# Patient Record
Sex: MALE | Race: WHITE
[De-identification: names, ages, dates, MRNs, and addresses within clinical notes are randomized per-mention and may not be internally consistent; named-entity substitution may affect disease eponyms.]

---

## 2017-04-13 ENCOUNTER — HOSPITAL ENCOUNTER (OUTPATIENT)
Dept: HOSPITAL 47 - LABWHC1 | Age: 72
Discharge: HOME | End: 2017-04-13
Payer: MEDICARE

## 2017-04-13 DIAGNOSIS — B02.31: Primary | ICD-10-CM

## 2017-04-13 LAB
HSV IGG SER-IMP: NEGATIVE
HSV IGG SER-IMP: POSITIVE

## 2017-04-13 PROCEDURE — 86696 HERPES SIMPLEX TYPE 2 TEST: CPT

## 2017-04-13 PROCEDURE — 86695 HERPES SIMPLEX TYPE 1 TEST: CPT

## 2017-04-13 PROCEDURE — 86787 VARICELLA-ZOSTER ANTIBODY: CPT

## 2017-04-13 PROCEDURE — 36415 COLL VENOUS BLD VENIPUNCTURE: CPT

## 2017-04-13 PROCEDURE — 86644 CMV ANTIBODY: CPT

## 2017-04-13 PROCEDURE — 86645 CMV ANTIBODY IGM: CPT

## 2019-06-10 ENCOUNTER — HOSPITAL ENCOUNTER (OUTPATIENT)
Dept: HOSPITAL 47 - RADCTMAIN | Age: 74
Discharge: HOME | End: 2019-06-10
Payer: MEDICARE

## 2019-06-10 DIAGNOSIS — R10.13: Primary | ICD-10-CM

## 2019-06-10 LAB — BUN SERPL-SCNC: 30 MG/DL (ref 9–20)

## 2019-06-10 PROCEDURE — 84520 ASSAY OF UREA NITROGEN: CPT

## 2019-06-10 PROCEDURE — 36415 COLL VENOUS BLD VENIPUNCTURE: CPT

## 2019-06-10 PROCEDURE — 74177 CT ABD & PELVIS W/CONTRAST: CPT

## 2019-06-10 PROCEDURE — 82565 ASSAY OF CREATININE: CPT

## 2019-06-10 NOTE — CT
EXAMINATION TYPE: CT abdomen pelvis w con

 

DATE OF EXAM: 6/10/2019

 

COMPARISON: None.

 

HISTORY: Epigastric pain.

 

CT DLP: 1599 mGycm, Automated Exposure Control for Dose Reduction was Utilized.

 

CONTRAST: 

CT scan of the abdomen and pelvis is performed with oral and with IV Contrast, patient injected with 
80ml mL of Isovue 300.

 

FINDINGS:

 

LUNG BASES: There is patchy bibasilar linear scarring and/or atelectasis identified. There is small t
o tiny pericardial effusion and cardiomegaly noted.

 

LIVER/GB: Cholecystectomy clips are seen.

 

PANCREAS:  No significant abnormality is seen.

 

SPLEEN:  No significant abnormality is seen.

 

ADRENALS: There is nonspecific 2.0 cm left adrenal mass axial image 31. This is noted stable in size 
from CT chest November 15, 2012 this presumed benign in etiology.

 

KIDNEYS: There is a 8.4 cm simple appearing partially exophytic renal cyst lower pole of the left kid
olivia. Symmetric cortical medullary uptake and excretion from both kidneys are seen without hydronephro
sis identified bilaterally.

 

BOWEL: The oral contrast does not reach colonic level making evaluation of distal bowel suboptimal. T
here are surgical sutures at the diaphragmatic hiatus redemonstrated. There is no suspicious small or
 large bowel dilatation seen. Normal-appearing appendix is seen from cecum. Some diverticula are seen
 in the left and sigmoid colon with redundant sigmoid colon into the abdomen. No CT evidence for acut
e diverticulitis.

 

PROSTATE/SEMINAL VESICLES: Enlarged prostate gland consistent with BPH is present.

 

LYMPH NODES:  No greater than 1cm abdominal or pelvic lymph nodes are appreciated.

 

OSSEOUS STRUCTURES: Moderate narrowing of both hip joints. Levoconvex scoliosis centered at L3 level.
 Slight grade 1 retrolisthesis of L3 on L4 with moderate disc space narrowing. Multilevel facet arthr
opathy in the lower lumbar spine is present.

 

OTHER: Mild calcified plaque of the aorta extends into branch vessels.

 

IMPRESSION:  No significant finding is seen to account for patient's clinical symptoms of epigastric 
pain.

## 2019-08-22 ENCOUNTER — HOSPITAL ENCOUNTER (OUTPATIENT)
Dept: HOSPITAL 47 - ORWHC2ENDO | Age: 74
Discharge: HOME | End: 2019-08-22
Payer: MEDICARE

## 2019-08-22 VITALS — HEART RATE: 56 BPM

## 2019-08-22 VITALS — BODY MASS INDEX: 29.5 KG/M2

## 2019-08-22 VITALS — TEMPERATURE: 98.2 F

## 2019-08-22 VITALS — SYSTOLIC BLOOD PRESSURE: 182 MMHG | RESPIRATION RATE: 16 BRPM | DIASTOLIC BLOOD PRESSURE: 71 MMHG

## 2019-08-22 DIAGNOSIS — K57.30: ICD-10-CM

## 2019-08-22 DIAGNOSIS — K21.9: ICD-10-CM

## 2019-08-22 DIAGNOSIS — Z12.11: ICD-10-CM

## 2019-08-22 DIAGNOSIS — Z88.1: ICD-10-CM

## 2019-08-22 DIAGNOSIS — Z95.5: ICD-10-CM

## 2019-08-22 DIAGNOSIS — K29.50: Primary | ICD-10-CM

## 2019-08-22 DIAGNOSIS — K64.8: ICD-10-CM

## 2019-08-22 DIAGNOSIS — I25.10: ICD-10-CM

## 2019-08-22 DIAGNOSIS — Z85.6: ICD-10-CM

## 2019-08-22 DIAGNOSIS — Z88.8: ICD-10-CM

## 2019-08-22 DIAGNOSIS — D12.3: ICD-10-CM

## 2019-08-22 DIAGNOSIS — Z86.010: ICD-10-CM

## 2019-08-22 DIAGNOSIS — D12.4: ICD-10-CM

## 2019-08-22 DIAGNOSIS — Z91.048: ICD-10-CM

## 2019-08-22 PROCEDURE — 45385 COLONOSCOPY W/LESION REMOVAL: CPT

## 2019-08-22 PROCEDURE — 43239 EGD BIOPSY SINGLE/MULTIPLE: CPT

## 2019-08-22 PROCEDURE — 88305 TISSUE EXAM BY PATHOLOGIST: CPT

## 2019-08-22 RX ADMIN — POTASSIUM CHLORIDE SCH MLS: 14.9 INJECTION, SOLUTION INTRAVENOUS at 11:54

## 2019-08-22 RX ADMIN — POTASSIUM CHLORIDE SCH MLS: 14.9 INJECTION, SOLUTION INTRAVENOUS at 11:31

## 2019-08-22 NOTE — P.PCN
Date of Procedure: 08/22/19


Description of Procedure: 


Brief history:


Patient is a pleasant scheduled for an elective upper endoscopy as well as 

colonoscopy as a part of evaluation of abdominal pain, GERD and history of colon

polyps.  Patient reports diffuse abdominal pain.  He also has a history of 

vagotomy, pyloroplasty and hiatal hernia repair.  Reports intermittent dysphagia

at the level of the hiatal hernia repair.  Previously underwent colonoscopies 

with Dr. Serrato with last in 2016 with polyps removed.





Procedure performed:


Esophagogastroduodenoscopy


Colonoscopy





Estimated blood loss:


Minimal.





Preoperative diagnosis:


Abdominal pain, GERD, history of colon polyps, last colonoscopy 3 years ago 

significant for 3 polyps resected





Anesthesia: 


[facility] [place]





Procedure:


After informed consent was obtained from the patient  was brought into the 

endoscopy unit and IV  sedation was administered by anesthesia under continuous 

monitoring.  Initially upper endoscopy was done.  The Olympus  video 

endoscope was inserted inserted into the mouth and esophagus intubated without 

any difficulty and was gradually advanced into the stomach and duodenum and 

carefully examined.  The small bowel appeared normal with biopsies taken.  The 

scope was then withdrawn into the stomach which was adequately insufflated with 

air and carefully examined.  Anatomy was consistent with prior history of 

pyloroplasty.  There was some mild diffuse erythema which was randomly biopsied.

 The scope was then withdrawn into the esophagus.  The GE junction was located 

at 44 cm from the incisors and biopsies were taken.  The esophagus appeared 

normal ulcerations.  Patient tolerated the procedure well..  The scope was then 

withdrawn into the stomach adequately insufflated with air and upon careful 

examination  the antrum and body, cardia and fundus appeared normal.  The scope 

was then withdrawn into the esophagus.  The GE junction was located at 40 cm to 

the incisors.  It appeared regular with no erythema erosions or ulcerations.  

Rest of the esophagus appeared normal.  Patient tolerated the procedure well.





At this time the patient continued to remain sedation.  Initial digital rectal 

examination was normal.  Olympus  video colonoscope was then inserted into

the rectum and gradually advanced to the cecum without any difficulty.  Careful 

examination was performed as the scope was gradually being withdrawn.  The prep 

was excellent.  The cecum, ascending colon, transverse colon, descending colon, 

sigmoid colon and rectum appeared normal.  2 sessile transverse colon polyps 

measuring 4 mm removed with cold snare polypectomy.  One diminutive 1 mm 

descending colon polyp removed with cold forcep polypectomy.  Mild left-sided d

iverticulosis.  Mild internal hemorrhoids.  Retroflexion was performed in the 

rectum and no lesions were noted.  Patient tolerated the procedure well.





Impression:


1.  Mild gastritis, biopsied.  Pyloroplasty.  Duodenal biopsies.  GE junction 

biopsies.


2.  2 small polyps removed from transverse colon with cold snare.  One 

diminutive descending polyp removed with cold forcep.  Diverticulosis.  Internal

hemorrhoids.








Recommendations:


Findings of this examination were discussed with the patient as well as his 

wife.  Okay to resume diet.  Await pathology from polypectomy.  Anticipate 

repeat colonoscopy in 3-5 years pending pathology from polypectomy.

## 2020-06-15 ENCOUNTER — HOSPITAL ENCOUNTER (OUTPATIENT)
Dept: HOSPITAL 47 - RADFLMAIN | Age: 75
Discharge: HOME | End: 2020-06-15
Attending: OTOLARYNGOLOGY
Payer: MEDICARE

## 2020-06-15 DIAGNOSIS — R07.0: Primary | ICD-10-CM

## 2020-06-15 PROCEDURE — 74230 X-RAY XM SWLNG FUNCJ C+: CPT

## 2020-06-15 NOTE — FL
EXAMINATION TYPE: FL barium swallow w video

 

DATE OF EXAM: 6/15/2020

 

MODIFIED SWALLOW / DEGLUTITION STUDY

 

CLINICAL HISTORY: Dysphagia. Throat discomfort. History of hiatal hernia repair surgery.

 

TECHNIQUE:  Deglutition study is performed utilizing thin liquid barium, honey and nectar thick liqui
d barium, barium thick applesauce, and barium coated cracker. There is total of 1 minute 37 seconds o
f fluoroscopic time utilized during procedure.  Zero spot images saved to PACS.

 

COMPARISON: None.

 

FINDINGS: The oral and pharyngeal phases show satisfactory initiation and propagation with all modali
ties tested. Satisfactory mastication is seen with solid modalities tested.  There is no evidence of 
penetration or aspiration with any modality tested.  No significant pharyngeal residue was appreciate
d.

 

IMPRESSION: No penetration or aspiration observed.  Please refer to speech therapist notes for furthe
r details if necessary.

## 2020-06-17 ENCOUNTER — HOSPITAL ENCOUNTER (OUTPATIENT)
Dept: HOSPITAL 47 - RADUSWWP | Age: 75
Discharge: HOME | End: 2020-06-17
Attending: OTOLARYNGOLOGY
Payer: MEDICARE

## 2020-06-17 DIAGNOSIS — K22.8: Primary | ICD-10-CM

## 2020-06-17 DIAGNOSIS — K21.9: ICD-10-CM

## 2020-06-17 DIAGNOSIS — K44.9: ICD-10-CM

## 2020-06-17 PROCEDURE — 74220 X-RAY XM ESOPHAGUS 1CNTRST: CPT

## 2020-06-17 NOTE — FL
EXAMINATION TYPE: FL barium swallow

 

DATE OF EXAM: 6/17/2020

 

CLINICAL INDICATION: 74-year-old male R07.0, throat pain and sensation of solid foods getting stuck i
n the lower chest. Prior hiatal hernia repair in 1971.

 

COMPARISON: Correlation CT 6/10/2019

 

Total Fluoroscopy Time: 2 minutes 9 seconds

Total images: 38 

 

FINDINGS: 

The swallowing mechanism is normal and hypopharyngeal anatomy is preserved. There is some anterior en
dplate spondylosis at C5-C6 causing mild impression onto the posterior wall of the hypopharynx and up
per cervical esophagus.

 

The thoracic portion has a normal course and caliber. Moderate tertiary peristaltic contractions are 
demonstrated throughout. When the patient is supine, secondary stripping waves are blunted to absent 
resulting in prolonged pooling of contrast in the esophagus with episodes of intraesophageal reflux.

 

The mucosa is normal and no persistent filling defect is encountered. 

 

Surgical clips are present at the GE junction with a recurrent small hiatal hernia. Only mild gastroe
sophageal reflux could be elicited during the course of the exam.

 

 

IMPRESSION: 

1. Presbyesophagus with tertiary peristaltic contractions, blunted to absent secondary stripping wave
s, and prolonged pooling of contrast in the esophagus when the patient is supine. Episodes of intraes
ophageal reflux are demonstrated.

 

2. Prior surgical change but with a recurrent small hiatal hernia. Only mild gastroesophageal reflux 
was elicited during the course of the exam.

## 2020-07-14 ENCOUNTER — HOSPITAL ENCOUNTER (OUTPATIENT)
Dept: HOSPITAL 47 - RADCTMAIN | Age: 75
Discharge: HOME | End: 2020-07-14
Attending: OTOLARYNGOLOGY
Payer: MEDICARE

## 2020-07-14 DIAGNOSIS — J32.9: Primary | ICD-10-CM

## 2020-07-14 PROCEDURE — 70486 CT MAXILLOFACIAL W/O DYE: CPT

## 2020-07-14 NOTE — CT
EXAMINATION TYPE: CT sinus wo con

 

DATE OF EXAM: 7/14/2020

 

COMPARISON: None

 

HISTORY: chronic sinus drainage, throat discomfort

 

CT DLP: 705.9 mGycm.  Automated Exposure Control for Dose Reduction was Utilized.

 

TECHNIQUE: CT scan of the sinuses is performed without contrast, axial images are obtained, coronal r
eformatted images are also reviewed.

 

FINDINGS: The  paranasal sinuses including the frontal, ethmoid, sphenoid, and maxillary sinuses bila
terally are well-aerated without abnormal opacification.  The prior antrostomy at the level of the un
cinate processes of the maxillary sinuses. Mucoperiosteal thickening present within the ethmoid air c
ells

 

Visualized portion of mastoid air cells show no abnormal opacification.  The globes are intact bilate
rally.  The calvarium shows lucent foci thought likely to be normal variants.

 

IMPRESSION: The sinuses are remarkable for chronic sinusitis change in the ethmoid air cells. Lucent 
lesions within the calvarium show nonaggressive appearance, no cortical disruption.

## 2020-08-12 ENCOUNTER — HOSPITAL ENCOUNTER (OUTPATIENT)
Dept: HOSPITAL 47 - CPPFTMAIN | Age: 75
Discharge: HOME | End: 2020-08-12
Attending: INTERNAL MEDICINE
Payer: MEDICARE

## 2020-08-12 DIAGNOSIS — R94.2: ICD-10-CM

## 2020-08-12 DIAGNOSIS — R06.00: Primary | ICD-10-CM

## 2020-08-12 PROCEDURE — 94726 PLETHYSMOGRAPHY LUNG VOLUMES: CPT

## 2020-08-12 PROCEDURE — 94060 EVALUATION OF WHEEZING: CPT

## 2020-08-12 PROCEDURE — 94729 DIFFUSING CAPACITY: CPT

## 2021-01-08 ENCOUNTER — HOSPITAL ENCOUNTER (OUTPATIENT)
Dept: HOSPITAL 47 - RADUSWWP | Age: 76
Discharge: HOME | End: 2021-01-08
Attending: FAMILY MEDICINE
Payer: MEDICARE

## 2021-01-08 DIAGNOSIS — N43.3: Primary | ICD-10-CM

## 2021-01-08 DIAGNOSIS — N50.819: ICD-10-CM

## 2021-01-08 PROCEDURE — 76870 US EXAM SCROTUM: CPT

## 2021-01-08 PROCEDURE — 93975 VASCULAR STUDY: CPT

## 2021-01-08 NOTE — US
EXAMINATION TYPE: US groin LT

 

DATE OF EXAM: 1/8/2021

 

COMPARISON: NONE

 

CLINICAL HISTORY: R10.33 Periumbilical pain.

Left groin pain.

 

 

No ultrasound evidence of left groin hernia. 

 

 

 

IMPRESSION:  

1. Ultrasound left groin demonstrates no diagnostic evidence of solid or cystic mass. No diagnostic e
vidence of hernia.

## 2021-01-08 NOTE — US
EXAMINATION TYPE: US scrotum with doppler.  Grayscale and color Doppler Duplex imaging performed of [place] scrotum.

 

DATE OF EXAM: 1/8/2021

 

COMPARISON: NONE

 

CLINICAL HISTORY: N50.819 testicular pain.

 

 

EXAM MEASUREMENTS:

 

TESTICLES:

Right Testicle:  2.8 x 1.6 x 2.1 cm

Left Testicle:  2.9 x 2.1 x 2.6 cm

 

EPIDIDYMIS HEAD:

Right Epididymis:  0.8 cm

Left Epididymis:  0.4 cm  

 

Doppler performed to assess for testicular vascularity; good bilateral color flow and waveforms are s
een.   There is no evidence of testicular torsion.

 

Presence of hydroceles:  left measuring  2.3 x 1.2 x 1.0

Presence of varicoceles: no

 

 

 

IMPRESSION: 

1. There is a small left hydrocele.

## 2021-04-01 ENCOUNTER — HOSPITAL ENCOUNTER (OUTPATIENT)
Dept: HOSPITAL 47 - RADUSWWP | Age: 76
Discharge: HOME | End: 2021-04-01
Attending: ORTHOPAEDIC SURGERY
Payer: MEDICARE

## 2021-04-01 DIAGNOSIS — R60.0: Primary | ICD-10-CM

## 2021-04-01 NOTE — US
EXAMINATION TYPE: US venous doppler duplex LE LT

 

DATE OF EXAM: 4/1/2021 3:31 PM

 

COMPARISON: NONE

 

CLINICAL HISTORY: LEFT LEG I80.9. Left leg swelling and Left lateral thigh pain 2 weeks post meniscus
 repair. 

 

SIDE PERFORMED: Left  

 

TECHNIQUE:  The lower extremity deep venous system is examined utilizing real time linear array sonog
arlen with graded compression, doppler sonography and color-flow sonography.

 

VESSELS IMAGED:

Common Femoral Vein

Deep Femoral Vein

Greater Saphenous Vein *

Femoral Vein

Popliteal Vein

Small Saphenous Vein *

Proximal Calf Veins

(* superficial vessels)

 

 

Left Leg:  Negative for DVT. Thin fluid area seen left lateral thigh at patient's area of pain size =
 12.7 long x 0.6cm A/P.

 

 

 

IMPRESSION:

1. Left lower extremity ultrasound negative for deep venous thrombosis.

2. Some superficial soft tissue edema is evident

## 2021-07-21 ENCOUNTER — HOSPITAL ENCOUNTER (OUTPATIENT)
Dept: HOSPITAL 47 - RADCTMAIN | Age: 76
Discharge: HOME | End: 2021-07-21
Attending: INTERNAL MEDICINE
Payer: MEDICARE

## 2021-07-21 DIAGNOSIS — R10.9: ICD-10-CM

## 2021-07-21 DIAGNOSIS — E27.8: ICD-10-CM

## 2021-07-21 DIAGNOSIS — G89.29: Primary | ICD-10-CM

## 2021-07-21 LAB — BUN SERPL-SCNC: 24 MG/DL (ref 9–20)

## 2021-07-21 PROCEDURE — 82565 ASSAY OF CREATININE: CPT

## 2021-07-21 PROCEDURE — 84520 ASSAY OF UREA NITROGEN: CPT

## 2021-07-21 PROCEDURE — 36415 COLL VENOUS BLD VENIPUNCTURE: CPT

## 2021-07-21 PROCEDURE — 74177 CT ABD & PELVIS W/CONTRAST: CPT

## 2021-07-21 NOTE — CT
EXAMINATION TYPE: CT abdomen pelvis w con

 

DATE OF EXAM: 7/21/2021

 

COMPARISON: 6/10/2019

 

HISTORY: Chronic abdominal pain

 

CT DLP: 1242.90 mGycm

 

CONTRAST: 

CT scan of the abdomen and pelvis is performed with Oral Contrast and with IV Contrast, patient injec
luis with 100 ml mL of Isovue 300.

 

FINDINGS: 

LUNG BASES-: Pleural-based nodular density left lower lobe persists and appears slightly larger in si
ze and currently measures 6.8 mm versus 6.7 mm. No infiltrate. There is evidence of cardiomegaly with
 small pericardial effusion.

 

LIVER/GB:   No calcified gallstones.  No space occupying hepatic lesion. Biliary tree is of normal ca
liber. 

 

PANCREAS:  No inflammation.  No distinct mass. 

 

SPLEEN:  No splenic enlargement.  No lesion seen. 

 

ADRENALS: Left adrenal nodule measuring 1.7 cm. No thickening. 

 

KIDNEYS/BLADDER:  No hydronephrosis.  No nephrolithiasis. Large cyst lower pole left kidney measuring
 8.5 x 2.7 cm. Urinary bladder grossly unremarkable. 

 

BOWEL: Normal appendix.  Normal bowel caliber.  No inflammation.

 

GENITAL ORGANS:  No gross abnormality. 

 

LYMPH NODES:  No greater than 1cm abdominal or pelvic lymph nodes are appreciated.

 

AORTA: No significant abnormality. 

 

OSSEOUS STRUCTURES:  No significant abnormality is seen. 

 

OTHER:  No significant additional abnormality is seen. 

 

IMPRESSION: 

1. No acute process to account for the patient's symptoms.

## 2021-08-17 ENCOUNTER — HOSPITAL ENCOUNTER (OUTPATIENT)
Dept: HOSPITAL 47 - RADCTMAIN | Age: 76
Discharge: HOME | End: 2021-08-17
Attending: FAMILY MEDICINE
Payer: MEDICARE

## 2021-08-17 DIAGNOSIS — R91.1: Primary | ICD-10-CM

## 2021-08-17 DIAGNOSIS — J98.11: ICD-10-CM

## 2021-08-17 PROCEDURE — 71250 CT THORAX DX C-: CPT

## 2021-08-18 NOTE — CT
EXAMINATION TYPE: CT chest wo con

 

DATE OF EXAM: 8/17/2021

 

COMPARISON: Chest CT November 15, 2012. CT abdomen and pelvis July 21, 2021

 

HISTORY: f/u nodules, recent abnormal CT.

 

CT DLP: 678 mGycm.  Automated Exposure Control for Dose Reduction was Utilized.

 

 

TECHNIQUE:  CT scan of the thorax is performed without IV contrast.

 

FINDINGS:

 

LUNGS: Persistent mild to moderate posterolateral pleural thickening left lower lung with nodularity 
measuring up to 1.1 x 0.9 cm axial image 40 and 9 x 8 mm just inferior to this axial image 42. Mild t
o moderate bibasilar linear scarring and/or atelectasis. All findings new from 2012 CT. Upper lungs r
emain clear. No pleural effusion or pneumothorax seen bilaterally.

 

MEDIASTINUM: Lack of IV contrast is noted to limit evaluation for mediastinal and especially hilar ad
enopathy. There are no definitive greater than 1 cm mediastinal lymph nodes. At least moderate corona
ry artery calcification is present. Small to  moderate size pericardial effusion redemonstrated measu
ring up to 2.7 cm thickness axial image 45 for reference. Heart size less prominent on current study.


 

OTHER: Surgical changes epigastric region redemonstrated. Scoliotic curvature with mild multilevel sp
urring of the spine again seen. Cholecystectomy clips redemonstrated.

 

IMPRESSION: 

1. Persistent posterior lateral left basilar pleural thickening with nodularity could reflect rounded
 scarring, atelectasis, or nodular thickening however true pulmonary nodule not excluded. Consider PE
T CT follow-up to further evaluate, findings new from 2012 study.

2. Small to moderate-sized pericardial effusion slightly more prominent than recent CT, clinical steve
elation is advised. Improved cardiomegaly noted.

## 2021-09-07 ENCOUNTER — HOSPITAL ENCOUNTER (OUTPATIENT)
Dept: HOSPITAL 47 - ORWHC2ENDO | Age: 76
Discharge: HOME | End: 2021-09-07
Attending: SURGERY
Payer: MEDICARE

## 2021-09-07 VITALS — BODY MASS INDEX: 29.3 KG/M2

## 2021-09-07 VITALS — RESPIRATION RATE: 16 BRPM

## 2021-09-07 VITALS — DIASTOLIC BLOOD PRESSURE: 75 MMHG | SYSTOLIC BLOOD PRESSURE: 135 MMHG | HEART RATE: 73 BPM

## 2021-09-07 VITALS — TEMPERATURE: 97.6 F

## 2021-09-07 DIAGNOSIS — I10: ICD-10-CM

## 2021-09-07 DIAGNOSIS — Z88.0: ICD-10-CM

## 2021-09-07 DIAGNOSIS — K21.9: ICD-10-CM

## 2021-09-07 DIAGNOSIS — K57.30: ICD-10-CM

## 2021-09-07 DIAGNOSIS — Z98.890: ICD-10-CM

## 2021-09-07 DIAGNOSIS — Z90.49: ICD-10-CM

## 2021-09-07 DIAGNOSIS — K29.50: ICD-10-CM

## 2021-09-07 DIAGNOSIS — Z79.82: ICD-10-CM

## 2021-09-07 DIAGNOSIS — I25.10: ICD-10-CM

## 2021-09-07 DIAGNOSIS — Z88.8: ICD-10-CM

## 2021-09-07 DIAGNOSIS — Z88.1: ICD-10-CM

## 2021-09-07 DIAGNOSIS — D12.2: Primary | ICD-10-CM

## 2021-09-07 DIAGNOSIS — Z95.5: ICD-10-CM

## 2021-09-07 DIAGNOSIS — Z85.828: ICD-10-CM

## 2021-09-07 DIAGNOSIS — D50.9: ICD-10-CM

## 2021-09-07 PROCEDURE — 45385 COLONOSCOPY W/LESION REMOVAL: CPT

## 2021-09-07 PROCEDURE — 88305 TISSUE EXAM BY PATHOLOGIST: CPT

## 2021-09-07 PROCEDURE — 43239 EGD BIOPSY SINGLE/MULTIPLE: CPT

## 2021-09-07 NOTE — P.GSHP
History of Present Illness


H&P Date: 09/07/21


Chief Complaint: Iron deficiency anemia, screening





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  Patient has had upper abdominal and lower

abdominal pain.  Patient with increasing anemia recently.  Last colonoscopy 9 

years ago.  No family history of colon cancer.  No rectal bleeding or melena.  

No change in bowel habits.





Past Medical History


Past Medical History: Coronary Artery Disease (CAD), Cancer, GERD/Reflux, 

Hypertension, Skin Disorder


Additional Past Medical History / Comment(s): Hx. of CML, Hx. of Shingles cornea

L eye, Cysts L kidney. SOB, hiatal hernia, hx skin cancer


History of Any Multi-Drug Resistant Organisms: None Reported


Past Surgical History: Cholecystectomy, Heart Catheterization With Stent, Hernia

Repair, Orthopedic Surgery


Additional Past Surgical History / Comment(s): Thyroid surgery, Vasectomy, mult 

R ankle surgery, Sinus surgery, Basal cell R shoulder, Pyloroplasty & Vagotomy, 

Hemorrhoidectomy, left Cataract surgery ,  bx. of inner thigh, repair hiatal 

hernia, total 3 cardiac stents


Past Anesthesia/Blood Transfusion Reactions: No Reported Reaction


Date of Last Stent Placement:: 2/8/2006


Smoking Status: Never smoker





- Past Family History


  ** Mother


Family Medical History: Cancer, Pulmonary Embolus





  ** Father


Family Medical History: Cancer





  ** Brother(s)


Family Medical History: Cancer





Medications and Allergies


                                Home Medications











 Medication  Instructions  Recorded  Confirmed  Type


 


Ascorbic Acid [Vitamin C] 1,000 mg PO DAILY 08/21/19 09/02/21 History


 


Aspirin 81 mg PO DAILY 08/21/19 09/02/21 History


 


Cholecalciferol (Vitamin D3) 2,000 unit PO DAILY 08/21/19 09/02/21 History





[Vitamin D3]    


 


Cranberry Fruit Extract [Cranberry] 200 mg PO DAILY 08/21/19 09/02/21 History


 


Fish Oil/Dha/Epa [Fish Oil 1,200 1 each PO DAILY 08/21/19 09/02/21 History





mg Fish Oil]    


 


Losartan [Cozaar] 50 mg PO DAILY 08/21/19 09/02/21 History


 


Metoprolol Tartrate [Lopressor] 25 mg PO BID 08/21/19 09/02/21 History


 


Nitroglycerin Sl Tabs [Nitrostat] 0.4 mg SUBLINGUAL Q5M PRN 08/21/19 09/02/21 

History


 


Omeprazole [PriLOSEC] 40 mg PO DAILY 08/21/19 09/02/21 History


 


Ubidecarenone [Co Q-10] 100 mg PO DAILY 08/21/19 09/02/21 History


 


valACYclovir [Valtrex] 500 mg PO TID 08/21/19 09/02/21 History


 


Cardiocentrum 2 tab PO DAILY 09/02/21 09/02/21 History


 


Cetirizine HCl [Zyrtec] 10 mg PO DAILY 09/02/21 09/02/21 History


 


Dasatinib [Sprycel] 100 mg PO 0300 09/02/21 09/02/21 History


 


Dicyclomine [Bentyl] 10 mg PO BID 09/02/21 09/02/21 History


 


Docusate [Colace] 100 mg PO BID 09/02/21 09/02/21 History








                                    Allergies











Allergy/AdvReac Type Severity Reaction Status Date / Time


 


adhesive tape Allergy  Rash/Hives Verified 08/22/19 11:37


 


amoxicillin [From Augmentin] Allergy  Rash/Hives Verified 08/22/19 11:37


 


budesonide [From Symbicort] Allergy  Vision Verified 09/02/21 12:00





   issues  


 


ciprofloxacin Allergy  Muscle Verified 09/02/21 12:00





   aches  


 


clavulanic acid Allergy  Rash/Hives Verified 09/02/21 12:00





[From Augmentin]     


 


doxycycline Allergy  Rectal Verified 09/02/21 12:00





   Bleeding  


 


enalapril Allergy  Cough Verified 09/02/21 12:00


 


fluticasone Allergy  Irregular Verified 09/02/21 12:00





[From Advair Diskus]   heart rate  


 


formoterol [From Symbicort] Allergy  Vision Verified 09/02/21 12:00





   issues  


 


mold Allergy  Rash/Hives Verified 09/02/21 12:00


 


salmeterol Allergy  Irregular Verified 09/02/21 12:00





[From Advair Diskus]   heart rate  


 


Statins-Hmg-Coa Reductase Allergy  Muscle Verified 09/02/21 12:00





Inhibitor   aches  


 


Cotton material Allergy  itching, Uncoded 09/02/21 12:00





   hives  


 


tasinga Allergy  pancreatiti Uncoded 09/02/21 12:00





   s  














Surgical - Exam


                                   Vital Signs











Temp Pulse Resp BP Pulse Ox


 


 97.6 F   71   15   175/81   100 


 


 09/07/21 12:25  09/07/21 12:25  09/07/21 12:25  09/07/21 12:25  09/07/21 12:25

















Physical exam:


General: Well-developed, well-nourished


HEENT: Normocephalic, sclerae nonicteric


Abdomen: Nontender, nondistended


Extremities: No edema


Neuro: Alert and oriented





Assessment and Plan


(1) Colon cancer screening


Narrative/Plan: 


Will proceed with upper and lower endoscopy


Current Visit: Yes   Status: Acute   Code(s): Z12.11 - ENCOUNTER FOR SCREENING 

FOR MALIGNANT NEOPLASM OF COLON   SNOMED Code(s): 909805660

## 2021-09-07 NOTE — P.PCN
Date of Procedure: 09/07/21


Procedure(s) Performed: 


PREOPERATIVE DIAGNOSIS: Anemia, pain, screening


POSTOPERATIVE DIAGNOSIS: Gastritis


PROCEDURE: 1.  EGD with biopsy 2.  Colonoscopy with snare polypectomy


ANESTHESIA: MAC


SURGEON: Monster Garcia M.D.


SPECIMENS: Antrum


ENDOSCOPIC PROCEDURE: The patient was on the endoscopy table in the left 

decubitus position.  The Olympus gastroscope was inserted into the oropharynx 

and passed under direct visualization to the region of the third portion of the 

duodenum.  From that point the scope was slowly withdrawn inspecting all 

surfaces carefully.  There were no neoplastic inflammatory or polypoid lesions 

throughout the duodenum.  The pylorus was widely patent.  The stomach was 

carefully inspected.  There was mild diffuse gastritis present.  There was a 

small amount of old blood within the stomach however no identifiable source was 

seen.  A biopsy of the antrum took place to rule out H. pylori.  Retroflexion 

revealed a normal hiatus.  The esophagus was then carefully examined.  There 

were no neoplastic inflammatory or polypoid lesions throughout the visualized 

esophagus.


     The patient was kept on the endoscopy table in the left decubitus position.

 The Olympus colonoscope was inserted into the anus and passed under direct 

visualization to the base of the cecum.  The appendiceal orifice was visualized.

 From that point the scope was slowly withdrawn inspecting all surfaces 

carefully.  There were no neoplastic inflammatory or polypoid lesions throughout

the cecum.  In the ascending colon a small polyp was seen and removed using the 

snare with cautery technique.  The remainder of the ascending transverse 

descending and sigmoid and rectum appeared normal.  The patient's prep was 

slightly suboptimal limiting our visualization slightly.  The patient had 

extensive diverticulosis throughout the colon.  Digital rectal examination was 

normal.  The patient was taken to the recovery room in stable condition per 

anesthesia guidelines.


RECOMMENDATIONS: Resume diet.  Await biopsy results.  Definite etiology for pain

not seen on today's study.  Patient's gastritis could be partially explaining 

his pain and recent anemia.

## 2021-10-22 ENCOUNTER — HOSPITAL ENCOUNTER (OUTPATIENT)
Dept: HOSPITAL 47 - RADUSWWP | Age: 76
Discharge: HOME | End: 2021-10-22
Attending: FAMILY MEDICINE
Payer: MEDICARE

## 2021-10-22 DIAGNOSIS — N50.3: ICD-10-CM

## 2021-10-22 DIAGNOSIS — N50.811: Primary | ICD-10-CM

## 2021-10-22 DIAGNOSIS — N43.3: ICD-10-CM

## 2021-10-22 PROCEDURE — 76870 US EXAM SCROTUM: CPT

## 2021-10-22 PROCEDURE — 93975 VASCULAR STUDY: CPT

## 2021-10-22 NOTE — US
EXAMINATION TYPE: US scrotum with doppler.  Grayscale and color Doppler Duplex imaging performed of t
he scrotum.

 

DATE OF EXAM: 10/22/2021

 

COMPARISON: NONE

 

CLINICAL HISTORY: N50.819 Testicular pain, unspecified. Right Testicular pain for the past 2 years 

 

 

EXAM MEASUREMENTS:

 

TESTICLES:

Right Testicle:  3.1 x 2.1 x 1.3 cm 

Left Testicle:  3.5 x 2.3 x 1.2 cm

 

EPIDIDYMIS HEAD:

Right Epididymis:  0.8 x 0.8 x 0.4 cm

Left Epididymis:  0.9 x 0.8 x 1.0 cm  

 

Doppler performed to assess for testicular vascularity; good bilateral color flow and waveforms are s
een.   There is no evidence of testicular torsion.

 

Presence of hydroceles:  Left 

Presence of varicoceles:  No

Left Epididymis: anchoic area 0.4 x 0.4 x 0.4cm

 

 

 

IMPRESSION:

Left epididymal head cyst. Small left hydrocele.

## 2022-02-16 ENCOUNTER — HOSPITAL ENCOUNTER (OUTPATIENT)
Dept: HOSPITAL 47 - RADCTMAIN | Age: 77
Discharge: HOME | End: 2022-02-16
Attending: INTERNAL MEDICINE
Payer: MEDICARE

## 2022-02-16 DIAGNOSIS — R91.8: Primary | ICD-10-CM

## 2022-02-16 DIAGNOSIS — I31.3: ICD-10-CM

## 2022-02-16 LAB — BUN SERPL-SCNC: 20 MG/DL (ref 9–20)

## 2022-02-16 PROCEDURE — 36415 COLL VENOUS BLD VENIPUNCTURE: CPT

## 2022-02-16 PROCEDURE — 82565 ASSAY OF CREATININE: CPT

## 2022-02-16 PROCEDURE — 71260 CT THORAX DX C+: CPT

## 2022-02-16 PROCEDURE — 84520 ASSAY OF UREA NITROGEN: CPT

## 2022-02-16 NOTE — CT
EXAMINATION TYPE: CT chest w con

 

DATE OF EXAM: 2/16/2022

 

COMPARISON: CT dated 8/17/2021

 

HISTORY: Lung Nodule

 

CT DLP: 564 mGycm

Automated exposure control for dose reduction was used.

 

TECHNIQUE: 

CT scan of the chest is performed with IV Contrast, patient injected with 100 ml mL of Isovue 300.

 

FINDINGS:

Redemonstration of the previously seen pleural-based nodules at the posterior aspect of the left lung
 base measuring 8 x 11 mm superiorly and 8 x 8 mm inferiorly, unchanged since June 2019 CT scan, cons
istent with benign nodules and requiring no further follow-up.

 

Newly seen cluster of faint groundglass micronodules at the posterior aspect of the right upper lobe,
 measuring up to 6 mm and predominantly centrilobular in location, probably inflammatory/infectious i
n etiology. Stable bilateral basal minimal reticulations/subtle fibrotic changes and areas of septal 
thickening.

 

Grossly unremarkable lungs otherwise. Patent central airways. No pleural effusion. No gross cardiomeg
kishan. Persistent moderate pericardial effusion not significantly improved compared to the previous CT 
scan. Persistent arterial atherosclerotic calcifications also including the coronary arteries.

 

No pathologically enlarged lymph nodes in the chest. Surgical clips are seen at the gastroesophageal 
junction. Previous cholecystectomy. Stable tiny hypodensities in the left hepatic lobe likely represe
nting cysts. Unchanged left adrenal lesion likely representing an adenoma. Demineralized bones. No ag
gressive bone lesion.

 

IMPRESSION:

1. Stable left lung base pleural-based nodules since 2019 CT scan consistent with benign nodules and 
requiring no further follow-up.

2. Newly seen cluster of faint micronodules measuring up to 6 mm at the posterior aspect of the right
 upper lobe as described above, possibly inflammatory/infectious in etiology, please correlate clinic
ally. Precautionary follow-up CT scan in 2-3 months is advised.

3. Persistent moderate pericardial effusion, not improved compared to the previous CT scan. Other inc
idental findings as described above.

## 2022-10-03 ENCOUNTER — HOSPITAL ENCOUNTER (OUTPATIENT)
Dept: HOSPITAL 47 - LABWHC1 | Age: 77
Discharge: HOME | End: 2022-10-03
Attending: INTERNAL MEDICINE
Payer: MEDICARE

## 2022-10-03 DIAGNOSIS — I10: Primary | ICD-10-CM

## 2022-10-03 DIAGNOSIS — I25.10: ICD-10-CM

## 2022-10-03 LAB
ALP SERPL-CCNC: 69 U/L (ref 41–126)
ALT SERPL-CCNC: 30 U/L (ref 10–49)
AST SERPL-CCNC: 28 U/L (ref 14–35)
ERYTHROCYTE [DISTWIDTH] IN BLOOD BY AUTOMATED COUNT: 3.07 X 10*6/UL (ref 4.4–5.6)
ERYTHROCYTE [DISTWIDTH] IN BLOOD: 15 % (ref 11.5–14.5)
HCT VFR BLD AUTO: 31.7 % (ref 39.6–50)
HGB BLD-MCNC: 10.1 G/DL (ref 13–17)
MCH RBC QN AUTO: 32.9 PG (ref 27–32)
MCHC RBC AUTO-ENTMCNC: 31.9 G/DL (ref 32–37)
MCV RBC AUTO: 103.3 FL (ref 80–97)
NRBC BLD AUTO-RTO: 0 /100 WBCS (ref 0–0)
PLATELET # BLD AUTO: 218 X 10*3/UL (ref 140–440)
WBC # BLD AUTO: 8.31 X 10*3/UL (ref 4.5–10)

## 2022-10-03 PROCEDURE — 85027 COMPLETE CBC AUTOMATED: CPT

## 2022-10-03 PROCEDURE — 84450 TRANSFERASE (AST) (SGOT): CPT

## 2022-10-03 PROCEDURE — 84460 ALANINE AMINO (ALT) (SGPT): CPT

## 2022-10-03 PROCEDURE — 84075 ASSAY ALKALINE PHOSPHATASE: CPT

## 2022-10-03 PROCEDURE — 36415 COLL VENOUS BLD VENIPUNCTURE: CPT

## 2022-12-01 ENCOUNTER — HOSPITAL ENCOUNTER (OUTPATIENT)
Dept: HOSPITAL 47 - LABPAT | Age: 77
Discharge: HOME | End: 2022-12-01
Attending: INTERNAL MEDICINE
Payer: MEDICARE

## 2022-12-01 DIAGNOSIS — I35.0: ICD-10-CM

## 2022-12-01 DIAGNOSIS — Z01.812: Primary | ICD-10-CM

## 2022-12-01 DIAGNOSIS — I25.118: ICD-10-CM

## 2022-12-01 LAB
ANION GAP SERPL CALC-SCNC: 9.3 MMOL/L (ref 10–18)
BUN SERPL-SCNC: 18 MG/DL (ref 9–27)
CHLORIDE SERPL-SCNC: 109 MMOL/L (ref 96–109)
CO2 SERPL-SCNC: 24.1 MMOL/L (ref 20–27.5)
ERYTHROCYTE [DISTWIDTH] IN BLOOD BY AUTOMATED COUNT: 2.93 X 10*6/UL (ref 4.4–5.6)
ERYTHROCYTE [DISTWIDTH] IN BLOOD: 14.9 % (ref 11.5–14.5)
HCT VFR BLD AUTO: 29.6 % (ref 39.6–50)
HGB BLD-MCNC: 9.7 G/DL (ref 13–17)
MCH RBC QN AUTO: 33.1 PG (ref 27–32)
MCHC RBC AUTO-ENTMCNC: 32.8 G/DL (ref 32–37)
MCV RBC AUTO: 101 FL (ref 80–97)
NRBC BLD AUTO-RTO: 0 /100 WBCS (ref 0–0)
PLATELET # BLD AUTO: 282 X 10*3/UL (ref 140–440)
POTASSIUM SERPL-SCNC: 5 MMOL/L (ref 3.5–5.5)
SODIUM SERPL-SCNC: 142 MMOL/L (ref 135–145)
WBC # BLD AUTO: 7.68 X 10*3/UL (ref 4.5–10)

## 2022-12-01 PROCEDURE — 84443 ASSAY THYROID STIM HORMONE: CPT

## 2022-12-01 PROCEDURE — 80051 ELECTROLYTE PANEL: CPT

## 2022-12-01 PROCEDURE — 82565 ASSAY OF CREATININE: CPT

## 2022-12-01 PROCEDURE — 85027 COMPLETE CBC AUTOMATED: CPT

## 2022-12-01 PROCEDURE — 84520 ASSAY OF UREA NITROGEN: CPT

## 2022-12-07 ENCOUNTER — HOSPITAL ENCOUNTER (OUTPATIENT)
Dept: HOSPITAL 47 - CATHCVL | Age: 77
Discharge: HOME | End: 2022-12-07
Attending: INTERNAL MEDICINE
Payer: MEDICARE

## 2022-12-07 VITALS — SYSTOLIC BLOOD PRESSURE: 116 MMHG | HEART RATE: 77 BPM | DIASTOLIC BLOOD PRESSURE: 60 MMHG

## 2022-12-07 VITALS — BODY MASS INDEX: 29.8 KG/M2

## 2022-12-07 VITALS — TEMPERATURE: 98.2 F

## 2022-12-07 VITALS — RESPIRATION RATE: 16 BRPM

## 2022-12-07 DIAGNOSIS — Z79.899: ICD-10-CM

## 2022-12-07 DIAGNOSIS — I10: ICD-10-CM

## 2022-12-07 DIAGNOSIS — I08.0: ICD-10-CM

## 2022-12-07 DIAGNOSIS — E78.5: ICD-10-CM

## 2022-12-07 DIAGNOSIS — I25.10: Primary | ICD-10-CM

## 2022-12-07 DIAGNOSIS — I31.39: ICD-10-CM

## 2022-12-07 DIAGNOSIS — C92.10: ICD-10-CM

## 2022-12-07 PROCEDURE — 93325 DOPPLER ECHO COLOR FLOW MAPG: CPT

## 2022-12-07 PROCEDURE — 93320 DOPPLER ECHO COMPLETE: CPT

## 2022-12-07 PROCEDURE — 93312 ECHO TRANSESOPHAGEAL: CPT

## 2022-12-07 PROCEDURE — 93454 CORONARY ARTERY ANGIO S&I: CPT

## 2022-12-07 NOTE — CC
CARDIAC CATHETERIZATION REPORT



DATE OF PROCEDURE:

12/07/2022



PROCEDURE:

Coronary angiography.



PERFORMED BY:

Dr. DAVE Bonilla.



Moderate conscious sedation time was 20 minutes.  Patient was administered Versed.

Oxygen saturation, hemodynamics, and EKG were monitored closely.



CLINICAL INFORMATION:

Mr. Bryant Yang is a 77-year-old gentleman with a history of CAD, prior PCI of

LAD and circumflex in 2005 and 2006.  He has aortic stenosis, moderate to severe with

chronic myeloid leukemia, hypertension, hyperlipidemia, and also small-to-moderate

sized pericardial effusion.  He is under the care of oncologist and hemoglobin runs

about 9.5 to 10.5.  He has been having symptoms of increasing shortness of breath with

a worsening gradient across aortic valve and therefore he was brought in for a coronary

angiography and also transesophageal echo.  Risks, benefits, options, and rationale

were explained.



PROCEDURE NOTE:

Under local anesthesia and strict aseptic precautions, a 6-Green[place] introducer was placed

in the right radial artery.  Using standard Catrina catheters, I performed coronary

angiography and I did not cross the aortic valve and left ventricular pressures were

not measured.  The sheath was taken out and TR band applied as per protocol with

saturation in the fingers of the right hand of 96%.  The patient tolerated the

procedure well without complication.



CORONARY ANGIOGRAPHY FINDINGS:

RIGHT CORONARY ARTERY:  Small nondominant vessel, minor irregularities, limited amount

of myocardium being supplied by it, no significant disease.



LEFT MAIN CORONARY ARTERY:  Short, patent, disease-free vessel that bifurcates into LAD

and circumflex.



LEFT ANTERIOR DESCENDING CORONARY ARTERY:  Good-caliber vessel extends along the

anterior wall.  The mid LAD at the site of stenting, in fact there were 2 stents.  Both

the areas look widely patent with brisk flow.  No evidence of any significant stenosis.

The flow appears to be brisk and it gives off septal and diagonal branches, runs all

the way to the apex, curves over the apex to supply the inferoapical portion of left

ventricle.  The LAD therefore has mild-to-moderate calcification.  Widely patent vessel

at the site of stenting without any stenosis and opacified septal and diagonal branches

have minor irregularities.



LEFT POSTERIOR CIRCUMFLEX CORONARY ARTERY:  This is technically a dominant vessel, has

about a 40% stenosis in the proximal portion with calcification at the site of

stenting, but the flow is brisk and it gives off an obtuse marginal branch that again

subdivides into 2 branches, has minor irregularities, no more than 40% to 45%

narrowing.  The circumflex itself in the proximal portion of the site of stenting has

some haziness calcification, but I do not think the narrowing is more than 50%.  The

obtuse marginal branch circumflex runs in the AV groove and distally it bifurcates into

2 branches.  In between the 2 branches, there appears to be an area of about 40% to 45%

narrowing of the circumflex.  The PDA is of good caliber, fair distribution, no

significant disease.  PLV has mildly diffuse disease and is smaller.  Circumflex

therefore has about a 40% to 50% proximal lesion, distal branch lesion of about 40% to

50%, but no critical stenosis.



Left ventriculogram was not performed and aortic valve was not crossed.



FINAL IMPRESSION:

This patient has a left-dominant system 40% to 45% circumflex disease in the proximal

portion and another 40% in the distal portion.  Obtuse marginal has a 40% narrowing.

LAD that was stented before is widely patent with good flow.  RCA is nondominant.



RECOMMENDATIONS:

Before coronary anatomy findings, no intervention is necessary.  I will await the

results of transesophageal echo and make further recommendations.  If he does indeed

have significant aortic stenosis, he will be a good candidate for percutaneous aortic

valve implant.  Discussed the findings and thoughts in detail with the patient and

wife.  He will be discharged after transesophageal echo, and I will see him in the

office next week.





CHEMA / SUSANA: [id number] / Job#: [id number]

## 2022-12-07 NOTE — P.PCN
Date of Procedure: 12/07/22


Operative Findings: 








TRANSESOPHAGEAL ECHOCARDIOGRAM





:


KAYDEN PRESTON MD, RPVI





INDICATION:


Aortic stenosis





SEDATION: 


Conscious sedation





COMPLICATION: 


None





LEVEL OF SEDATION


Moderate to severe elevation of 15 minutes





PROCEDURE DESCRIPTION:


After obtaining an informed consent, the patient was brought to transesophageal 

echocardiogram room.  Pulse oximetry and heart monitors were attached to the 

patient.  





The patient throat was sprayed using lidocaine.  The patient was turned into 

left lateral position.  After that a bite guard was placed.  After an 

appropriate conscious sedation was initiated, the transesophageal echocardiogram

was advanced through a bite guard into the mid esophagus.  





A 2-D echocardiogram images, color Doppler images, continuous wave images, 

pulse-wave images, of various cardiac structure were performed.  After that the 

transesophageal echocardiogram probe was advanced into the stomach and fixed to 

obtain transgastric view was.  The probe was brought into the mid esophagus.  

Inter-atrial septum was interrogated using 2D images, color Doppler images, and 

then contrast study.  After that transesophageal echocardiogram was withdrawn 

out and upon withdrawing the descending thoracic aorta all the way up to the 

arch was evaluated.





FINDING:


The left ventricular dimension and systolic function appeared to be within 

normal limits.  The right ventricular dimension and systolic function appeared 

to be within normal limits.  Both atria are dilated.  The left atrial appendage 

appeared to be free from any thrombus.  The interatrial septum appears to be 

intact.  The aortic valve is trileaflet valve and appeared to be thickened and 

calcified was evidence of severe aortic stenosis and mean gradient of 42 peak a 

73 mmHg.  The mitral valve appeared to be mildly thickened was moderate MR.  

There is mild tricuspid regurgitation seen.  There is a small pericardial 

effusion identified as well.





CONCLUSION:


1.  Severe aortic stenosis with a mean gradient of 42 mmHg and peak systolic 

velocity of above 4 m/s.  Aortic valve is trileaflet valve


2.  Moderate mitral regurgitation with thickened anterior and posterior mitral 

leaflet


3.  Normal left ventricular dimension and systolic from


4.  Normal right ventricular dimension and systolic function


5.  Intact interatrial septum.  Intact left atrial appendage


6.  Small pericardial effusion identified

## 2022-12-11 ENCOUNTER — HOSPITAL ENCOUNTER (INPATIENT)
Dept: HOSPITAL 47 - EC | Age: 77
LOS: 8 days | Discharge: HOME HEALTH | DRG: 194 | End: 2022-12-19
Attending: HOSPITALIST | Admitting: HOSPITALIST
Payer: MEDICARE

## 2022-12-11 VITALS — BODY MASS INDEX: 29.5 KG/M2

## 2022-12-11 DIAGNOSIS — Z79.82: ICD-10-CM

## 2022-12-11 DIAGNOSIS — I49.3: ICD-10-CM

## 2022-12-11 DIAGNOSIS — Z87.891: ICD-10-CM

## 2022-12-11 DIAGNOSIS — R78.81: ICD-10-CM

## 2022-12-11 DIAGNOSIS — E78.5: ICD-10-CM

## 2022-12-11 DIAGNOSIS — N17.9: ICD-10-CM

## 2022-12-11 DIAGNOSIS — Z88.0: ICD-10-CM

## 2022-12-11 DIAGNOSIS — Z88.1: ICD-10-CM

## 2022-12-11 DIAGNOSIS — Z87.440: ICD-10-CM

## 2022-12-11 DIAGNOSIS — C92.10: ICD-10-CM

## 2022-12-11 DIAGNOSIS — Z87.442: ICD-10-CM

## 2022-12-11 DIAGNOSIS — I25.10: ICD-10-CM

## 2022-12-11 DIAGNOSIS — Z79.899: ICD-10-CM

## 2022-12-11 DIAGNOSIS — K21.9: ICD-10-CM

## 2022-12-11 DIAGNOSIS — Z85.828: ICD-10-CM

## 2022-12-11 DIAGNOSIS — D84.821: ICD-10-CM

## 2022-12-11 DIAGNOSIS — Z95.5: ICD-10-CM

## 2022-12-11 DIAGNOSIS — R32: ICD-10-CM

## 2022-12-11 DIAGNOSIS — Z86.19: ICD-10-CM

## 2022-12-11 DIAGNOSIS — D64.9: ICD-10-CM

## 2022-12-11 DIAGNOSIS — A49.01: ICD-10-CM

## 2022-12-11 DIAGNOSIS — Z91.048: ICD-10-CM

## 2022-12-11 DIAGNOSIS — I10: ICD-10-CM

## 2022-12-11 DIAGNOSIS — Z88.8: ICD-10-CM

## 2022-12-11 DIAGNOSIS — Z20.822: ICD-10-CM

## 2022-12-11 DIAGNOSIS — I31.39: ICD-10-CM

## 2022-12-11 DIAGNOSIS — I08.3: ICD-10-CM

## 2022-12-11 DIAGNOSIS — J18.9: Primary | ICD-10-CM

## 2022-12-11 LAB
ALBUMIN SERPL-MCNC: 4.1 G/DL (ref 3.5–5)
ALP SERPL-CCNC: 86 U/L (ref 38–126)
ALT SERPL-CCNC: 26 U/L (ref 4–49)
AMYLASE SERPL-CCNC: 43 U/L (ref 30–110)
ANION GAP SERPL CALC-SCNC: 8 MMOL/L
APTT BLD: 24.2 SEC (ref 22–30)
AST SERPL-CCNC: 32 U/L (ref 17–59)
BASOPHILS # BLD AUTO: 0 K/UL (ref 0–0.2)
BASOPHILS NFR BLD AUTO: 1 %
BUN SERPL-SCNC: 18 MG/DL (ref 9–20)
CALCIUM SPEC-MCNC: 9 MG/DL (ref 8.4–10.2)
CHLORIDE SERPL-SCNC: 109 MMOL/L (ref 98–107)
CO2 SERPL-SCNC: 21 MMOL/L (ref 22–30)
EOSINOPHIL # BLD AUTO: 0.1 K/UL (ref 0–0.7)
EOSINOPHIL NFR BLD AUTO: 1 %
ERYTHROCYTE [DISTWIDTH] IN BLOOD BY AUTOMATED COUNT: 3.04 M/UL (ref 4.3–5.9)
ERYTHROCYTE [DISTWIDTH] IN BLOOD: 15 % (ref 11.5–15.5)
GLUCOSE SERPL-MCNC: 123 MG/DL (ref 74–99)
HCT VFR BLD AUTO: 29.7 % (ref 39–53)
HGB BLD-MCNC: 10.6 GM/DL (ref 13–17.5)
INR PPP: 1 (ref ?–1.2)
LIPASE SERPL-CCNC: 29 U/L (ref 23–300)
LYMPHOCYTES # SPEC AUTO: 1.2 K/UL (ref 1–4.8)
LYMPHOCYTES NFR SPEC AUTO: 20 %
MCH RBC QN AUTO: 34.8 PG (ref 25–35)
MCHC RBC AUTO-ENTMCNC: 35.7 G/DL (ref 31–37)
MCV RBC AUTO: 97.7 FL (ref 80–100)
MONOCYTES # BLD AUTO: 0.4 K/UL (ref 0–1)
MONOCYTES NFR BLD AUTO: 6 %
NEUTROPHILS # BLD AUTO: 4.4 K/UL (ref 1.3–7.7)
NEUTROPHILS NFR BLD AUTO: 71 %
PH UR: 6 [PH] (ref 5–8)
PLATELET # BLD AUTO: 217 K/UL (ref 150–450)
POTASSIUM SERPL-SCNC: 4.2 MMOL/L (ref 3.5–5.1)
PROT SERPL-MCNC: 7 G/DL (ref 6.3–8.2)
PT BLD: 10.8 SEC (ref 9–12)
SODIUM SERPL-SCNC: 138 MMOL/L (ref 137–145)
SP GR UR: 1.02 (ref 1–1.03)
UROBILINOGEN UR QL STRIP: <2 MG/DL (ref ?–2)
WBC # BLD AUTO: 6.1 K/UL (ref 3.8–10.6)

## 2022-12-11 PROCEDURE — 87186 SC STD MICRODIL/AGAR DIL: CPT

## 2022-12-11 PROCEDURE — 96376 TX/PRO/DX INJ SAME DRUG ADON: CPT

## 2022-12-11 PROCEDURE — 36410 VNPNXR 3YR/> PHY/QHP DX/THER: CPT

## 2022-12-11 PROCEDURE — 80053 COMPREHEN METABOLIC PANEL: CPT

## 2022-12-11 PROCEDURE — 93005 ELECTROCARDIOGRAM TRACING: CPT

## 2022-12-11 PROCEDURE — 78306 BONE IMAGING WHOLE BODY: CPT

## 2022-12-11 PROCEDURE — 86140 C-REACTIVE PROTEIN: CPT

## 2022-12-11 PROCEDURE — 82150 ASSAY OF AMYLASE: CPT

## 2022-12-11 PROCEDURE — 76937 US GUIDE VASCULAR ACCESS: CPT

## 2022-12-11 PROCEDURE — 99285 EMERGENCY DEPT VISIT HI MDM: CPT

## 2022-12-11 PROCEDURE — 83921 ORGANIC ACID SINGLE QUANT: CPT

## 2022-12-11 PROCEDURE — 82565 ASSAY OF CREATININE: CPT

## 2022-12-11 PROCEDURE — 96375 TX/PRO/DX INJ NEW DRUG ADDON: CPT

## 2022-12-11 PROCEDURE — 87040 BLOOD CULTURE FOR BACTERIA: CPT

## 2022-12-11 PROCEDURE — 82746 ASSAY OF FOLIC ACID SERUM: CPT

## 2022-12-11 PROCEDURE — 81003 URINALYSIS AUTO W/O SCOPE: CPT

## 2022-12-11 PROCEDURE — 83540 ASSAY OF IRON: CPT

## 2022-12-11 PROCEDURE — 80048 BASIC METABOLIC PNL TOTAL CA: CPT

## 2022-12-11 PROCEDURE — 74176 CT ABD & PELVIS W/O CONTRAST: CPT

## 2022-12-11 PROCEDURE — 96367 TX/PROPH/DG ADDL SEQ IV INF: CPT

## 2022-12-11 PROCEDURE — 82607 VITAMIN B-12: CPT

## 2022-12-11 PROCEDURE — 83690 ASSAY OF LIPASE: CPT

## 2022-12-11 PROCEDURE — 85027 COMPLETE CBC AUTOMATED: CPT

## 2022-12-11 PROCEDURE — 96365 THER/PROPH/DIAG IV INF INIT: CPT

## 2022-12-11 PROCEDURE — 85045 AUTOMATED RETICULOCYTE COUNT: CPT

## 2022-12-11 PROCEDURE — 96366 THER/PROPH/DIAG IV INF ADDON: CPT

## 2022-12-11 PROCEDURE — 83605 ASSAY OF LACTIC ACID: CPT

## 2022-12-11 PROCEDURE — 84484 ASSAY OF TROPONIN QUANT: CPT

## 2022-12-11 PROCEDURE — 87205 SMEAR GRAM STAIN: CPT

## 2022-12-11 PROCEDURE — 83550 IRON BINDING TEST: CPT

## 2022-12-11 PROCEDURE — 85652 RBC SED RATE AUTOMATED: CPT

## 2022-12-11 PROCEDURE — 71046 X-RAY EXAM CHEST 2 VIEWS: CPT

## 2022-12-11 PROCEDURE — 82728 ASSAY OF FERRITIN: CPT

## 2022-12-11 PROCEDURE — 36415 COLL VENOUS BLD VENIPUNCTURE: CPT

## 2022-12-11 PROCEDURE — 85610 PROTHROMBIN TIME: CPT

## 2022-12-11 PROCEDURE — 87070 CULTURE OTHR SPECIMN AEROBIC: CPT

## 2022-12-11 PROCEDURE — 87636 SARSCOV2 & INF A&B AMP PRB: CPT

## 2022-12-11 PROCEDURE — 87077 CULTURE AEROBIC IDENTIFY: CPT

## 2022-12-11 PROCEDURE — 85730 THROMBOPLASTIN TIME PARTIAL: CPT

## 2022-12-11 PROCEDURE — 94760 N-INVAS EAR/PLS OXIMETRY 1: CPT

## 2022-12-11 PROCEDURE — 85025 COMPLETE CBC W/AUTO DIFF WBC: CPT

## 2022-12-11 PROCEDURE — 83880 ASSAY OF NATRIURETIC PEPTIDE: CPT

## 2022-12-11 NOTE — XR
EXAMINATION TYPE: XR chest 2V

 

DATE OF EXAM: 12/11/2022 9:37 PM

 

COMPARISON: CT chest 2/16/2022

 

TECHNIQUE: XR chest 2V .

 

CLINICAL INDICATION:Male, 77 years old with history of Weakness; 

 

FINDINGS: 

Lungs/Pleura: Patchy airspace opacity in the medial left lower lobe. Right lung is clear. No pneumoth
orax or sizable pleural effusion.

Pulmonary vascularity: Mild pulmonary vascular congestion.

Heart/mediastinum: Cardiomediastinal silhouette is enlarged and stable.  Atherosclerotic calcificatio
ns are seen in the aorta.

Musculoskeletal: Multiple level degenerative disc disease changes seen throughout the spine.

 

IMPRESSION: 

1 Left lower lobe airspace opacity concerning for acute infectious process such as pneumonia.

 

2. Cardiomegaly with mild pulmonary vascular congestion.

## 2022-12-11 NOTE — ED
Fever HPI





- General


Chief Complaint: Fever


Stated Complaint: Fever post heart cath


Time Seen by Provider: 12/11/22 21:00


Source: patient, family, RN notes reviewed


Mode of arrival: ambulatory


Limitations: no limitations





- History of Present Illness


Initial Comments: 


This is a 77-year-old male who presents to the emergency department for a fever 

and weakness.  4 days ago, the patient had a cardiac catheterization, however no

stents were placed.  Additionally, the patient is being treated for CML with a 

daily chemotherapy pill (Sprycel).  Dr. Dumas is his provider.  His wife states 

that he does have a history of kidney stones, and several weeks ago when he 

provided a urine sample, the urine was the color of milk.  He was subsequently 

put on an antibiotic, however his wife cannot recall the name of this.  The 

fever and weakness largely started this morning.  He has an associated cough and

overall feels unwell.  Denies any chest pain, difficulty breathing, or sick 

contacts.  Also reports centralized abdominal pain but denies any nausea or 

vomiting.





Denies any chills, sore throat, dyspnea, chest pain, palpitations, nausea, 

vomiting, diarrhea, back pain, or headaches.





MD Complaint: fever, malaise


Context: recent procedure, on immunosuppressant(s)


Associated Symptoms: nasal congestion, cough, abdominal pain


Treatments Prior to Arrival: Acetaminophen





- Related Data


                                Home Medications











 Medication  Instructions  Recorded  Confirmed


 


Ascorbic Acid [Vitamin C] 1,000 mg PO DAILY 08/21/19 12/07/22


 


Aspirin 81 mg PO DAILY 08/21/19 12/07/22


 


Cholecalciferol (Vitamin D3) 2,000 unit PO DAILY 08/21/19 12/07/22





[Vitamin D3]   


 


Cranberry Fruit Extract [Cranberry] 200 mg PO DAILY 08/21/19 12/07/22


 


Fish Oil/Dha/Epa [Fish Oil 1,200 1 each PO DAILY 08/21/19 12/07/22





mg Fish Oil]   


 


Losartan [Cozaar] 50 mg PO DAILY 08/21/19 12/07/22


 


Metoprolol Tartrate [Lopressor] 25 mg PO BID 08/21/19 12/07/22


 


Nitroglycerin Sl Tabs [Nitrostat] 0.4 mg SUBLINGUAL Q5M PRN 08/21/19 12/07/22


 


Omeprazole [PriLOSEC] 40 mg PO DAILY 08/21/19 12/07/22


 


Ubidecarenone [Co Q-10] 100 mg PO DAILY 08/21/19 12/07/22


 


valACYclovir HCL [Valtrex] 500 mg PO TID 08/21/19 12/07/22


 


Cardiocentrum 2 tab PO DAILY 09/02/21 12/07/22


 


Cetirizine HCl [Zyrtec] 10 mg PO DAILY 09/02/21 12/07/22


 


Dasatinib [Sprycel] 100 mg PO 0300 09/02/21 12/07/22


 


Dicyclomine [Bentyl] 10 mg PO BID 09/02/21 12/07/22


 


Docusate [Colace] 100 mg PO BID 09/02/21 12/07/22


 


Albuterol Inhaler [Ventolin Hfa 1 - 2 puff INHALATION Q6H PRN 12/07/22 12/07/22





Inhaler]   


 


Fluticasone/Vilanterol [Breo 1 inhalation INHALATION QID PRN 12/07/22 12/07/22





Ellipta 200-25 Mcg Inhaler]   











                                    Allergies











Allergy/AdvReac Type Severity Reaction Status Date / Time


 


adhesive tape Allergy  Rash/Hives,"paper Verified 12/11/22 19:57





   tape is ok"  


 


amoxicillin [From Augmentin] Allergy  Rash/Hives Verified 12/11/22 19:57


 


budesonide [From Symbicort] Allergy  Vision Verified 12/11/22 19:57





   issues  


 


ciprofloxacin Allergy  Muscle Verified 12/11/22 19:57





   aches  


 


clavulanic acid Allergy  Rash/Hives Verified 12/11/22 19:57





[From Augmentin]     


 


doxycycline Allergy  Rectal Verified 12/11/22 19:57





   Bleeding  


 


enalapril Allergy  Cough Verified 12/11/22 19:57


 


fluticasone Allergy  Irregular Verified 12/11/22 19:57





[From Advair Diskus]   heart rate  


 


formoterol [From Symbicort] Allergy  Vision Verified 12/11/22 19:57





   issues  


 


mold Allergy  Rash/Hives Verified 12/11/22 19:57


 


salmeterol Allergy  Irregular Verified 12/11/22 19:57





[From Advair Diskus]   heart rate  


 


Statins-HMG-CoA Reductase Allergy  Muscle Verified 12/11/22 19:57





Inhibitor   aches  





[Statins-Hmg-Coa Reductase     





Inhibitor]     


 


Cotton material Allergy  itching, Uncoded 12/11/22 19:57





   hives  


 


tasinga Allergy  pancreatiti Uncoded 12/11/22 19:57





   s  














Review of Systems


ROS Statement: 


Those systems with pertinent positive or pertinent negative responses have been 

documented in the HPI.





ROS Other: All systems not noted in ROS Statement are negative.





Past Medical History


Past Medical History: Coronary Artery Disease (CAD), Cancer, GERD/Reflux, 

Hypertension, Skin Disorder


Additional Past Medical History / Comment(s): Hx. of CML, Hx. of Shingles L eye,

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


History of Any Multi-Drug Resistant Organisms: None Reported


Past Surgical History: Cholecystectomy, Heart Catheterization With Stent, Hernia

Repair, Orthopedic Surgery


Additional Past Surgical History / Comment(s): Thyroid surgery,Vasectomy, R 

ankle surgery, Sinus surgery, Basal cell R shoulder, Pyloroplasty & Vagotomy, 

Hemorrhoidectomy, Cataract surgery & bx. of inner thigh.


Past Anesthesia/Blood Transfusion Reactions: No Reported Reaction


Date of Last Stent Placement:: 2006


Past Psychological History: No Psychological Hx Reported


Smoking Status: Former smoker


Past Alcohol Use History: None Reported


Past Drug Use History: None Reported





- Past Family History


  ** Mother


Family Medical History: Cancer, Pulmonary Embolus





  ** Father


Family Medical History: Cancer





  ** Brother(s)


Family Medical History: Cancer





  ** Son(s)


Family Medical History: Cancer





General Exam


Limitations: no limitations


General appearance: alert, other (drowsy)


Head exam: Present: other (Ecchymosis to the nose and underneath the bilateral 

eyes due to recent biopsy on the nose.)


ENT exam: Present: normal exam, mucous membranes moist, TM's normal bilaterally,

normal external ear exam


Neck exam: Present: normal inspection.  Absent: tenderness, meningismus, 

lymphadenopathy


Respiratory exam: Present: normal lung sounds bilaterally.  Absent: respiratory 

distress, wheezes, rales, rhonchi, stridor


Cardiovascular Exam: Present: regular rate, normal rhythm, normal heart sounds. 

Absent: systolic murmur, diastolic murmur, rubs, gallop, clicks


GI/Abdominal exam: Present: soft, normal bowel sounds.  Absent: distended, 

tenderness, guarding, rebound, rigid


Back exam: Absent: CVA tenderness (R), CVA tenderness (L)


Neurological exam: Present: alert, oriented X3, CN II-XII intact


Psychiatric exam: Present: normal affect, normal mood


Skin exam: Present: warm, dry, intact, normal color.  Absent: rash





Course


                                   Vital Signs











  12/11/22 12/11/22 12/11/22





  19:57 21:02 23:00


 


Temperature 100.3 F H  100 F H


 


Pulse Rate 82 77 77


 


Respiratory 16 18 16





Rate   


 


Blood Pressure 139/69 153/64 136/70


 


O2 Sat by Pulse 98 98 98





Oximetry   














Medical Decision Making





- Medical Decision Making


This is a 77-year-old male who presents to the emergency department for fevers 

and weakness.  Patient noted to be febrile on arrival and was subsequently given

a dose of Tylenol.  Lab work reveals a mildly elevated BNP and was otherwise 

nonactionable.  Patient is negative for Covid, influenza, and RSV.  Chest x-ray 

obtained, my interpretation reveals an opacity to the left lower lobe, 

suspicious of pneumonia.  Due to the abdominal pain and history of kidney 

stones, computed tomography scan of the abdomen and pelvis was obtained.  My 

interpretation of this reveals no ureteral calculi or bowel wall thickening.  

The radiologist makes note of bilateral perinephric fat stranding and minimal 

fat stranding around the mid and proximal sigmoid colon without a clear 

diagnosis of diverticulitis.  Because the patient is immunocompromised with the 

CML and daily chemotherapy, will admit to medicine for management of the 

pneumonia.  Pneumonia protocol initiated with ceftriaxone and azithromycin.  

Blood and sputum cultures obtained prior to medication administration.





This case was discussed in detail with the attending ED physician. Presentation,

findings, and treatment plan discussed in detail as well. 








- Lab Data


Result diagrams: 


                                 12/11/22 21:20





                                 12/11/22 21:20


                                   Lab Results











  12/11/22 12/11/22 12/11/22 Range/Units





  21:20 21:20 21:20 


 


WBC  6.1    (3.8-10.6)  k/uL


 


RBC  3.04 L    (4.30-5.90)  m/uL


 


Hgb  10.6 L    (13.0-17.5)  gm/dL


 


Hct  29.7 L    (39.0-53.0)  %


 


MCV  97.7    (80.0-100.0)  fL


 


MCH  34.8    (25.0-35.0)  pg


 


MCHC  35.7    (31.0-37.0)  g/dL


 


RDW  15.0    (11.5-15.5)  %


 


Plt Count  217    (150-450)  k/uL


 


MPV  7.3    


 


Neutrophils %  71    %


 


Lymphocytes %  20    %


 


Monocytes %  6    %


 


Eosinophils %  1    %


 


Basophils %  1    %


 


Neutrophils #  4.4    (1.3-7.7)  k/uL


 


Lymphocytes #  1.2    (1.0-4.8)  k/uL


 


Monocytes #  0.4    (0-1.0)  k/uL


 


Eosinophils #  0.1    (0-0.7)  k/uL


 


Basophils #  0.0    (0-0.2)  k/uL


 


PT   10.8   (9.0-12.0)  sec


 


INR   1.0   (<1.2)  


 


APTT   24.2   (22.0-30.0)  sec


 


Sodium    138  (137-145)  mmol/L


 


Potassium    4.2  (3.5-5.1)  mmol/L


 


Chloride    109 H  ()  mmol/L


 


Carbon Dioxide    21 L  (22-30)  mmol/L


 


Anion Gap    8  mmol/L


 


BUN    18  (9-20)  mg/dL


 


Creatinine    1.00  (0.66-1.25)  mg/dL


 


Est GFR (CKD-EPI)AfAm    84  (>60 ml/min/1.73 sqM)  


 


Est GFR (CKD-EPI)NonAf    72  (>60 ml/min/1.73 sqM)  


 


Glucose    123 H  (74-99)  mg/dL


 


Plasma Lactic Acid Dave     (0.7-2.0)  mmol/L


 


Calcium    9.0  (8.4-10.2)  mg/dL


 


Total Bilirubin    0.5  (0.2-1.3)  mg/dL


 


AST    32  (17-59)  U/L


 


ALT    26  (4-49)  U/L


 


Alkaline Phosphatase    86  ()  U/L


 


Troponin I     (0.000-0.034)  ng/mL


 


NT-Pro-B Natriuret Pep     pg/mL


 


Total Protein    7.0  (6.3-8.2)  g/dL


 


Albumin    4.1  (3.5-5.0)  g/dL


 


Amylase    43  ()  U/L


 


Lipase    29  ()  U/L


 


Urine Color     


 


Urine Appearance     (Clear)  


 


Urine pH     (5.0-8.0)  


 


Ur Specific Gravity     (1.001-1.035)  


 


Urine Protein     (Negative)  


 


Urine Glucose (UA)     (Negative)  


 


Urine Ketones     (Negative)  


 


Urine Blood     (Negative)  


 


Urine Nitrite     (Negative)  


 


Urine Bilirubin     (Negative)  


 


Urine Urobilinogen     (<2.0)  mg/dL


 


Ur Leukocyte Esterase     (Negative)  


 


Influenza Type A (PCR)     (Not Detectd)  


 


Influenza Type B (PCR)     (Not Detectd)  


 


RSV (PCR)     (Not Detectd)  


 


SARS-CoV-2 (PCR)     (Not Detectd)  














  12/11/22 12/11/22 12/11/22 Range/Units





  21:20 21:20 21:20 


 


WBC     (3.8-10.6)  k/uL


 


RBC     (4.30-5.90)  m/uL


 


Hgb     (13.0-17.5)  gm/dL


 


Hct     (39.0-53.0)  %


 


MCV     (80.0-100.0)  fL


 


MCH     (25.0-35.0)  pg


 


MCHC     (31.0-37.0)  g/dL


 


RDW     (11.5-15.5)  %


 


Plt Count     (150-450)  k/uL


 


MPV     


 


Neutrophils %     %


 


Lymphocytes %     %


 


Monocytes %     %


 


Eosinophils %     %


 


Basophils %     %


 


Neutrophils #     (1.3-7.7)  k/uL


 


Lymphocytes #     (1.0-4.8)  k/uL


 


Monocytes #     (0-1.0)  k/uL


 


Eosinophils #     (0-0.7)  k/uL


 


Basophils #     (0-0.2)  k/uL


 


PT     (9.0-12.0)  sec


 


INR     (<1.2)  


 


APTT     (22.0-30.0)  sec


 


Sodium     (137-145)  mmol/L


 


Potassium     (3.5-5.1)  mmol/L


 


Chloride     ()  mmol/L


 


Carbon Dioxide     (22-30)  mmol/L


 


Anion Gap     mmol/L


 


BUN     (9-20)  mg/dL


 


Creatinine     (0.66-1.25)  mg/dL


 


Est GFR (CKD-EPI)AfAm     (>60 ml/min/1.73 sqM)  


 


Est GFR (CKD-EPI)NonAf     (>60 ml/min/1.73 sqM)  


 


Glucose     (74-99)  mg/dL


 


Plasma Lactic Acid Dave  0.7    (0.7-2.0)  mmol/L


 


Calcium     (8.4-10.2)  mg/dL


 


Total Bilirubin     (0.2-1.3)  mg/dL


 


AST     (17-59)  U/L


 


ALT     (4-49)  U/L


 


Alkaline Phosphatase     ()  U/L


 


Troponin I   <0.012   (0.000-0.034)  ng/mL


 


NT-Pro-B Natriuret Pep    1120  pg/mL


 


Total Protein     (6.3-8.2)  g/dL


 


Albumin     (3.5-5.0)  g/dL


 


Amylase     ()  U/L


 


Lipase     ()  U/L


 


Urine Color     


 


Urine Appearance     (Clear)  


 


Urine pH     (5.0-8.0)  


 


Ur Specific Gravity     (1.001-1.035)  


 


Urine Protein     (Negative)  


 


Urine Glucose (UA)     (Negative)  


 


Urine Ketones     (Negative)  


 


Urine Blood     (Negative)  


 


Urine Nitrite     (Negative)  


 


Urine Bilirubin     (Negative)  


 


Urine Urobilinogen     (<2.0)  mg/dL


 


Ur Leukocyte Esterase     (Negative)  


 


Influenza Type A (PCR)     (Not Detectd)  


 


Influenza Type B (PCR)     (Not Detectd)  


 


RSV (PCR)     (Not Detectd)  


 


SARS-CoV-2 (PCR)     (Not Detectd)  














  12/11/22 12/11/22 Range/Units





  21:29 23:13 


 


WBC    (3.8-10.6)  k/uL


 


RBC    (4.30-5.90)  m/uL


 


Hgb    (13.0-17.5)  gm/dL


 


Hct    (39.0-53.0)  %


 


MCV    (80.0-100.0)  fL


 


MCH    (25.0-35.0)  pg


 


MCHC    (31.0-37.0)  g/dL


 


RDW    (11.5-15.5)  %


 


Plt Count    (150-450)  k/uL


 


MPV    


 


Neutrophils %    %


 


Lymphocytes %    %


 


Monocytes %    %


 


Eosinophils %    %


 


Basophils %    %


 


Neutrophils #    (1.3-7.7)  k/uL


 


Lymphocytes #    (1.0-4.8)  k/uL


 


Monocytes #    (0-1.0)  k/uL


 


Eosinophils #    (0-0.7)  k/uL


 


Basophils #    (0-0.2)  k/uL


 


PT    (9.0-12.0)  sec


 


INR    (<1.2)  


 


APTT    (22.0-30.0)  sec


 


Sodium    (137-145)  mmol/L


 


Potassium    (3.5-5.1)  mmol/L


 


Chloride    ()  mmol/L


 


Carbon Dioxide    (22-30)  mmol/L


 


Anion Gap    mmol/L


 


BUN    (9-20)  mg/dL


 


Creatinine    (0.66-1.25)  mg/dL


 


Est GFR (CKD-EPI)AfAm    (>60 ml/min/1.73 sqM)  


 


Est GFR (CKD-EPI)NonAf    (>60 ml/min/1.73 sqM)  


 


Glucose    (74-99)  mg/dL


 


Plasma Lactic Acid Dave    (0.7-2.0)  mmol/L


 


Calcium    (8.4-10.2)  mg/dL


 


Total Bilirubin    (0.2-1.3)  mg/dL


 


AST    (17-59)  U/L


 


ALT    (4-49)  U/L


 


Alkaline Phosphatase    ()  U/L


 


Troponin I    (0.000-0.034)  ng/mL


 


NT-Pro-B Natriuret Pep    pg/mL


 


Total Protein    (6.3-8.2)  g/dL


 


Albumin    (3.5-5.0)  g/dL


 


Amylase    ()  U/L


 


Lipase    ()  U/L


 


Urine Color   Yellow  


 


Urine Appearance   Clear  (Clear)  


 


Urine pH   6.0  (5.0-8.0)  


 


Ur Specific Gravity   1.019  (1.001-1.035)  


 


Urine Protein   Trace H  (Negative)  


 


Urine Glucose (UA)   Negative  (Negative)  


 


Urine Ketones   Negative  (Negative)  


 


Urine Blood   Negative  (Negative)  


 


Urine Nitrite   Negative  (Negative)  


 


Urine Bilirubin   Negative  (Negative)  


 


Urine Urobilinogen   <2.0  (<2.0)  mg/dL


 


Ur Leukocyte Esterase   Negative  (Negative)  


 


Influenza Type A (PCR)  Not Detected   (Not Detectd)  


 


Influenza Type B (PCR)  Not Detected   (Not Detectd)  


 


RSV (PCR)  Not Detected   (Not Detectd)  


 


SARS-CoV-2 (PCR)  Not Detected   (Not Detectd)  














- EKG Data


-: EKG Interpreted by Me


EKG Comments: 


Sinus rhythm with frequent PVCs.  Ventricular rate 78 bpm, MO interval 193 ms, 

QRS duration 101 ms,  ms.








- Radiology Data


Radiology results: report reviewed, image reviewed





Disposition


Clinical Impression: 


 Pneumonia, Immunocompromised state, CML (chronic myeloid leukemia)





Disposition: ADMITTED AS IP TO THIS HOSP


Referrals: 


Mumtaz Roland MD [Primary Care Provider] - 1-2 days

## 2022-12-11 NOTE — CT
EXAMINATION TYPE: CT abdomen pelvis wo con

 

DATE OF EXAM: 12/11/2022

 

COMPARISON: 7/21/2021

 

HISTORY: upper abd pain after heart cath

 

CT DLP: 801.9 mGycm

Automated exposure control for dose reduction was used.

 

Images obtained from the diaphragm to the floor the pelvis without contrast.

 

There is some mild subsegmental atelectasis at the posterior lung bases. There is some minimal nodula
r infiltrate adjacent to the pleura in the left lower lobe. No pleural effusion. Heart size is enlarg
ed. There is small pericardial effusion. There are clips from surgery at the gastroesophageal junctio
n. The liver is intact. There are clips from cholecystectomy. Spleen is intact. No pancreatic mass.

 

There is no adrenal mass. There is some bilateral perinephric fat stranding. There is 9 cm cortical c
yst lower pole left kidney. No retroperitoneal adenopathy. Bladder distends smoothly. No inguinal her
alaina. There are multiple sigmoid diverticula. There is minimal stranding around the mid sigmoid colon.
 Appendix is small and medial and appears normal.

 

The lumbar vertebrae have fairly normal alignment. No compression fracture. There is disc space narro
wing at L3-4 with spur formation. There is a slight lumbar levoscoliosis. The hip joints are intact. 
There is mild acetabular spurring. The ureters are not dilated. No hydronephrosis.

 

IMPRESSION:

Mild pericardial effusion without change. Mild subsegmental atelectasis at the lung bases which is mo
stly new compared to old exam.

 

Normal appendix. 

Minimal stranding around the mid and proximal sigmoid colon also present on the old exam and not maxwell
rly sigmoid diverticulitis. No significant wall thickening. There is also some minimal fluid in the r
ight side in the paracolic gutter.

 

Bilateral perinephric fat stranding appears increased compared to old exam and could relate to previo
us episode of obstruction or inflammation.

## 2022-12-12 RX ADMIN — HYDROCODONE BITARTRATE AND ACETAMINOPHEN PRN EACH: 5; 325 TABLET ORAL at 09:21

## 2022-12-12 RX ADMIN — ONDANSETRON PRN MG: 2 INJECTION INTRAMUSCULAR; INTRAVENOUS at 19:27

## 2022-12-12 RX ADMIN — ACETAMINOPHEN PRN MG: 325 TABLET, FILM COATED ORAL at 08:32

## 2022-12-12 RX ADMIN — AZITHROMYCIN SCH MG: 500 TABLET, FILM COATED ORAL at 08:25

## 2022-12-12 RX ADMIN — HEPARIN SODIUM SCH UNIT: 5000 INJECTION INTRAVENOUS; SUBCUTANEOUS at 15:58

## 2022-12-12 RX ADMIN — HEPARIN SODIUM SCH UNIT: 5000 INJECTION INTRAVENOUS; SUBCUTANEOUS at 07:04

## 2022-12-12 RX ADMIN — ACETAMINOPHEN PRN MG: 325 TABLET, FILM COATED ORAL at 19:31

## 2022-12-12 RX ADMIN — ONDANSETRON PRN MG: 2 INJECTION INTRAMUSCULAR; INTRAVENOUS at 09:21

## 2022-12-12 NOTE — P.PN
Progress Note - Text


Progress Note Date: 12/12/22





Hospitalist Interval Note





Patient seen and examined at bedside. 








Vital signs reviewed


General: non toxic, no distress, appears at stated age


Derm: warm, dry


Head: atraumatic, normocephalic, symmetric


Eyes: EOMI, no lid lag, anicteric sclera


Mouth: no lip lesion, mucus membranes moist


Cardiovascular: S1S2 reg, no murmur, positive posterior tibial pulse bilateral, 


Lungs: CTA bilateral, no rhonchi, no rales , no accessory muscle use


Abdominal: soft,  mild tender to palpation in the epigastric region, no 

guarding, no appreciable organomegaly


Ext: no gross muscle atrophy, no edema, no contractures


Neuro:  CN II-XI grossly intact, no focal neuro deficits


Psych: Alert, oriented, appropriate affect 





Assessment/Plan:


community acquired pneumonia 


follow up cultures 


rocephine and azithro 


tylenol for fever


follow up cultures 


urinary legionella ag


respiratory viral panel negative 


CT chest abd, showed stable pericardial effusion , atelactesis , and perinephric

 fat stranding 


CXR mild pulmonary vascular congestion , left lower lobe opacity 





chronic conditions 


CML


h/o CAD s/p 3 stents , recent left heart cath no stent needed


recent removal of basal cell cancer left nose


aortic valve stenosis , continue OP follow up with cardiology 





full code


DVT PPX heparin sc tid 





This is an update note for patient , for full note on 12/12/22 at 2:36. There is

no charge associated with this note.

## 2022-12-12 NOTE — P.HPIM
History of Present Illness


H&P Date: 12/12/22


Chief Complaint: fever 





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 





patient coming in with complaint of fever and generalized weakness, he reports 

having left heart cath 4 days ago , no stents deployed , however he does have 3 

stents in the past. he was also recently treated for UTI about a month ago . 

currently he reports unchanged cough productive of clear sputum, denies any 

runny nose, chest pain or SOB. he denies any   changes in urinary or bowel 

habits. however he did report vague, dull achy abd pain extending across his 

upper abd. 





no recent travel , trauma , known sick contact. he recently had surgical removal

of basal cell cancer over the left side of his nose. 





workup in the ED


CT abd showig mild pericardial effusion unchanged , atelactesis 


CXR showed mild pulmonary vascular congestion , left lower lobe opacity 





blood work overall unremarkable 


subclinical fever in the ED





Review of Systems











Pertinent positives as noted in HPI. All other systems were reviewed and are 

negative











Past Medical History


Past Medical History: Coronary Artery Disease (CAD), Cancer, GERD/Reflux, 

Hypertension, Skin Disorder


Additional Past Medical History / Comment(s): Hx. of CML, Hx. of Shingles L eye,

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


History of Any Multi-Drug Resistant Organisms: None Reported


Past Surgical History: Cholecystectomy, Heart Catheterization With Stent, Hernia

Repair, Orthopedic Surgery


Additional Past Surgical History / Comment(s): Thyroid surgery,Vasectomy, R 

ankle surgery, Sinus surgery, Basal cell R shoulder, Pyloroplasty & Vagotomy, 

Hemorrhoidectomy, Cataract surgery & bx. of inner thigh.


Past Anesthesia/Blood Transfusion Reactions: No Reported Reaction


Date of Last Stent Placement:: 2006


Past Psychological History: No Psychological Hx Reported


Smoking Status: Former smoker


Past Alcohol Use History: None Reported


Past Drug Use History: None Reported





- Past Family History


  ** Mother


Family Medical History: Cancer, Pulmonary Embolus





  ** Father


Family Medical History: Cancer





  ** Brother(s)


Family Medical History: Cancer





  ** Son(s)


Family Medical History: Cancer





Medications and Allergies


                                Home Medications











 Medication  Instructions  Recorded  Confirmed  Type


 


Ascorbic Acid [Vitamin C] 1,000 mg PO DAILY 08/21/19 12/07/22 History


 


Aspirin 81 mg PO DAILY 08/21/19 12/07/22 History


 


Cholecalciferol (Vitamin D3) 2,000 unit PO DAILY 08/21/19 12/07/22 History





[Vitamin D3]    


 


Cranberry Fruit Extract [Cranberry] 200 mg PO DAILY 08/21/19 12/07/22 History


 


Fish Oil/Dha/Epa [Fish Oil 1,200 1 each PO DAILY 08/21/19 12/07/22 History





mg Fish Oil]    


 


Losartan [Cozaar] 50 mg PO DAILY 08/21/19 12/07/22 History


 


Metoprolol Tartrate [Lopressor] 25 mg PO BID 08/21/19 12/07/22 History


 


Nitroglycerin Sl Tabs [Nitrostat] 0.4 mg SUBLINGUAL Q5M PRN 08/21/19 12/07/22 

History


 


Omeprazole [PriLOSEC] 40 mg PO DAILY 08/21/19 12/07/22 History


 


Ubidecarenone [Co Q-10] 100 mg PO DAILY 08/21/19 12/07/22 History


 


valACYclovir HCL [Valtrex] 500 mg PO TID 08/21/19 12/07/22 History


 


Cardiocentrum 2 tab PO DAILY 09/02/21 12/07/22 History


 


Cetirizine HCl [Zyrtec] 10 mg PO DAILY 09/02/21 12/07/22 History


 


Dasatinib [Sprycel] 100 mg PO 0300 09/02/21 12/07/22 History


 


Dicyclomine [Bentyl] 10 mg PO BID 09/02/21 12/07/22 History


 


Docusate [Colace] 100 mg PO BID 09/02/21 12/07/22 History


 


Albuterol Inhaler [Ventolin Hfa 1 - 2 puff INHALATION Q6H PRN 12/07/22 12/07/22 

History





Inhaler]    


 


Fluticasone/Vilanterol [Breo 1 inhalation INHALATION QID PRN 12/07/22 12/07/22 

History





Ellipta 200-25 Mcg Inhaler]    








                                    Allergies











Allergy/AdvReac Type Severity Reaction Status Date / Time


 


adhesive tape Allergy  Rash/Hives,"paper Verified 12/11/22 19:57





   tape is ok"  


 


amoxicillin [From Augmentin] Allergy  Rash/Hives Verified 12/11/22 19:57


 


budesonide [From Symbicort] Allergy  Vision Verified 12/11/22 19:57





   issues  


 


ciprofloxacin Allergy  Muscle Verified 12/11/22 19:57





   aches  


 


clavulanic acid Allergy  Rash/Hives Verified 12/11/22 19:57





[From Augmentin]     


 


doxycycline Allergy  Rectal Verified 12/11/22 19:57





   Bleeding  


 


enalapril Allergy  Cough Verified 12/11/22 19:57


 


fluticasone Allergy  Irregular Verified 12/11/22 19:57





[From Advair Diskus]   heart rate  


 


formoterol [From Symbicort] Allergy  Vision Verified 12/11/22 19:57





   issues  


 


mold Allergy  Rash/Hives Verified 12/11/22 19:57


 


salmeterol Allergy  Irregular Verified 12/11/22 19:57





[From Advair Diskus]   heart rate  


 


Statins-HMG-CoA Reductase Allergy  Muscle Verified 12/11/22 19:57





Inhibitor   aches  





[Statins-Hmg-Coa Reductase     





Inhibitor]     


 


Cotton material Allergy  itching, Uncoded 12/11/22 19:57





   hives  


 


tasinga Allergy  pancreatiti Uncoded 12/11/22 19:57





   s  














Physical Exam


Vitals: 


                                   Vital Signs











  Temp Pulse Resp BP Pulse Ox


 


 12/12/22 04:00   76  16  123/60  98


 


 12/12/22 02:00    16  


 


 12/11/22 23:00  100 F H  77  16  136/70  98


 


 12/11/22 21:02   77  18  153/64  98


 


 12/11/22 19:57  100.3 F H  82  16  139/69  98








                                Intake and Output











 12/11/22 12/11/22 12/12/22





 14:59 22:59 06:59


 


Other:   


 


  Weight  96.162 kg 

















Constitutional:          No acute distress, conversant, pleasant


Eyes:      Anicteric sclerae, moist conjunctiva, 


         Pupils equal round reactive to light





ENMT:      NC/ healing wound over left side of the nose, with echymosis over 

bilateral cheeks


         Oropharynx clear, no erythema, or exudates





Neck:      Supple,  no masses, or JVD


         No carotid bruits


         No thyromegaly





Lungs:      Clear to auscultation


         Clear to percussion


         Normal respiratory effort, no accessory muscle use 





Cardiovascular:   Heart regular in rate and rhythm, 


         systolic  murmurs,  no gallops, or rubs


         No peripheral edema





Abdominal:       Soft


         Nontender, no guarding, rebound or rigidity


         Abdomen moving with respiration


         Normoactive bowel sounds


         No hepatomegaly, No splenomegaly


         No palpable mass 


         No abdominal wall hernia noted 





Skin:      Normal temperature, tone, texture, turgor


         No induration


         No subcutaneous nodules 


         No rash, lesions


         No ulcers





Extremities:      No digital cyanosis 


         No clubbing


         Pedal pulses intact and symmetrical


         Radial pulses intact and symmetrical 


         No calf tenderness 





Psychiatric:      Alert and oriented to person, place and time


         Appropriate affect


         fair judgement   


      


Neuro      Muscles Strength 5/5 in all 4 extremities 


         Sensation to light touch grossly present throughout


         Cranial nerves II-XII grossly intact 


Lymphatics:       no palpable cervical or supraclavicular   lymph nodes  





Results


CBC & Chem 7: 


                                 12/11/22 21:20





                                 12/11/22 21:20


Labs: 


                  Abnormal Lab Results - Last 24 Hours (Table)











  12/11/22 12/11/22 12/11/22 Range/Units





  21:20 21:20 23:13 


 


RBC  3.04 L    (4.30-5.90)  m/uL


 


Hgb  10.6 L    (13.0-17.5)  gm/dL


 


Hct  29.7 L    (39.0-53.0)  %


 


Chloride   109 H   ()  mmol/L


 


Carbon Dioxide   21 L   (22-30)  mmol/L


 


Glucose   123 H   (74-99)  mg/dL


 


Urine Protein    Trace H  (Negative)  














Assessment and Plan


Assessment: 





community acquired pneumonia 


follow up cultures 


rocephine and azithro 


tylenol for fever


follow up cultures 


urinary legionella ag


respiratory viral panel negative 


CT chest abd, showed stable pericardial effusion , atelactesis , and perinephric

 fat stranding 


CXR mild pulmonary vascular congestion , left lower lobe opacity 





chronic conditions 


CML


h/o CAD s/p 3 stents , recent left heart cath no stent needed


recent removal of basal cell cancer left nose


aortic valve stenosis , continue OP follow up with cardiology 





full code


DVT PPX heparin sc tid

## 2022-12-13 RX ADMIN — Medication SCH MCG: at 09:47

## 2022-12-13 RX ADMIN — HEPARIN SODIUM SCH UNIT: 5000 INJECTION INTRAVENOUS; SUBCUTANEOUS at 00:10

## 2022-12-13 RX ADMIN — PANTOPRAZOLE SODIUM SCH MG: 40 TABLET, DELAYED RELEASE ORAL at 09:47

## 2022-12-13 RX ADMIN — METOPROLOL TARTRATE SCH MG: 25 TABLET, FILM COATED ORAL at 20:37

## 2022-12-13 RX ADMIN — HEPARIN SODIUM SCH UNIT: 5000 INJECTION INTRAVENOUS; SUBCUTANEOUS at 09:46

## 2022-12-13 RX ADMIN — ONDANSETRON PRN MG: 2 INJECTION INTRAMUSCULAR; INTRAVENOUS at 14:07

## 2022-12-13 RX ADMIN — ACETAMINOPHEN PRN MG: 325 TABLET, FILM COATED ORAL at 11:11

## 2022-12-13 RX ADMIN — HYDROCODONE BITARTRATE AND ACETAMINOPHEN PRN EACH: 5; 325 TABLET ORAL at 15:30

## 2022-12-13 RX ADMIN — AZITHROMYCIN SCH MG: 500 TABLET, FILM COATED ORAL at 09:47

## 2022-12-13 RX ADMIN — DOCUSATE SODIUM SCH MG: 100 CAPSULE, LIQUID FILLED ORAL at 09:47

## 2022-12-13 RX ADMIN — HEPARIN SODIUM SCH UNIT: 5000 INJECTION INTRAVENOUS; SUBCUTANEOUS at 15:38

## 2022-12-13 RX ADMIN — HEPARIN SODIUM SCH UNIT: 5000 INJECTION INTRAVENOUS; SUBCUTANEOUS at 23:42

## 2022-12-13 RX ADMIN — ASPIRIN 81 MG CHEWABLE TABLET SCH MG: 81 TABLET CHEWABLE at 09:47

## 2022-12-13 RX ADMIN — DOCUSATE SODIUM SCH MG: 100 CAPSULE, LIQUID FILLED ORAL at 20:37

## 2022-12-13 RX ADMIN — METOPROLOL TARTRATE SCH MG: 25 TABLET, FILM COATED ORAL at 09:47

## 2022-12-13 NOTE — P.CRDCN
History of Present Illness


Consult date: 12/13/22


Reason for Consult (text): 





MORGAN


History of present illness: 





History of present illness:


This is a 77-year-old male patient of Dr. Bonilla with past medical history of 

coronary artery disease with previous PCI of the LAD and left circumflex in 200

5, aortic stenosis, chronic myeloleukemia, hypertension, hyperlipidemia, 

pericardial effusion and outflow tract obstruction.  Patient was brought in the 

hospital December 7 and underwent cardiac catheterization with Dr. MEGHANA Bonilla 

which revealed left dominant system, 40-45% circumflex disease in the proximal 

portion and another 40% in the distal portion.  Obtuse marginal has a 40% 

narrowing.  LAD that was stented before is widely patent with good flow.  RCA is

nondominant.  He subsequently underwent MORGAN with Dr. Thompson which revealed severe 

aortic stenosis with mean gradient of 42 mmHg and peak systolic velocity of 

above 4M/S.  Aortic valve trileaflet.  Moderate mitral regurgitation.  Patient 

was discharged home following procedures in stable condition.  On 12/11, patient

presented to the emergency center due to fever, weakness, fatigue and abdominal 

discomfort.  Patient has been febrile up to 103.3 fever and found to be 

bacteremic and infectious disease is following.  We have been asked to see the 

patient for MORGAN.





EKG is sinus rhythm with frequent PVCs


CT of the abdomen and pelvis without contrast revealed mild pericardial effusion

without change.  Mild subsegmental atelectasis at the lung bases mostly new.  

Minimal stranding around the mid and proximal sigmoid colon also present p

reviously.  Minimal perinephric fat stranding appears to be increased could 

relate to previous episode of obstruction or inflammation.


Chest x-ray reveals left lower lobe airspace opacities concerning for infection.

 Cardiomegaly with mild pulmonary vascular congestion.


WBC 6.1, hemoglobin 10.6, platelet count 217.  Sodium 138, potassium 4.2, 

chloride 109, CO2 21, BUN 18 creatinine 1 and repeat creatinine today of 1.42.  

Liver function tests within normal limits.  Troponin negative 1.  ProBNP 1120. 

Influenza A, influenza B, RSV and Covid not detected.  2 blood cultures positive

for staph aureus


Echocardiogram 11/18/2022 revealed moderate to severe aortic stenosis with mean 

of 39, mild to moderate mitral regurgitation, moderate tricuspid regurgitation, 

RVSP 70.  


Lexiscan stress test 9/16/2022 shows revealed no ischemia











Review Of Systems:


Constitutional: Documented fever, reports chills.  Reports weakness, Reports 

fatigue Reports lethargy. 


EENT: No headache.  No dizziness. 


Lungs: No shortness of breath, cough, no sputum production.  No wheezing.


Cardiovascular: No chest pain, no lower extremity edema.  No palpitations.  No 

paroxysmal nocturnal dyspnea.  No orthopnea.  No lightheadedness or dizziness.  

No syncopal episodes.


Abdominal: No abdominal pain.  No nausea, vomiting.  No diarrhea.  No 

constipation.  No bloody or tarry stools.  Reports loss of appetite.


Genitourinary: No dysuria..  No urinary retention.


Musculoskeletal: No myalgias.  Reports muscle weakness.


Integumentary: No wounds, no lesions.  No rash or pruritus.  No unusual 

bruising.


Neurologic: No aphasia. No facial droop. No change in mentation. No head injury.

No headache. No paralysis. No paresthesia.


Psychiatric: No depression.  No anxiety. 


Endocrine: No abnormal blood sugars.  








Physical examination:


Gen: This is a 77-year-old  male.


VS: Reviewed


HEENT: Head is atraumatic, normocephalic. Pupils equal, round. Sclerae is 

anicteric. 


NECK: Supple. No JVD. No lymphadenopathy. No thyromegaly. 


LUNGS: Clear to auscultation. No wheezes or rhonchi.  No intercostal 

retractions.


HEART: Regular rate and rhythm.  Systolic murmur right sternal border. 


ABDOMEN: Soft. Bowel sounds are present. No masses.  No tenderness.


EXTREMITIES: No pedal edema.  No calf tenderness.


NEUROLOGICAL: Patient is awake, alert and oriented x3. Cranial nerves 2 through 

12 are grossly intact. 





 





Assessment:


Sepsis secondary to Staph aureus bacteremia rule out endocarditis


Possible left lower lobe pneumonia


Acute kidney injury


Known history of severe aortic stenosis


Coronary artery disease status post PCI of the LAD and circumflex


CML


Hypertension


Hyperlipidemia


Chronic pericardial effusion








Plan:


Continue current treatment plan per infectious disease


We will wait on the advice of infectious disease regarding scheduling patient 

for MORGAN.  If Dr. Gomez believes this is necessary, MORGAN will be scheduled for 

tomorrow and patient will be made nothing by mouth tonight.


Continue patient's cardiac medications including aspirin 81 mg daily, Lopressor 

25 mg twice daily


Losartan is on hold


Further recommendations to follow based upon clinical course


Thank you kindly for this consultation.





Nurse practitioner note has been reviewed, I agree with documented findings and 

plan of care.  Patient was seen and examined.





Past Medical History


Past Medical History: Coronary Artery Disease (CAD), Cancer, GERD/Reflux, 

Hypertension, Skin Disorder


Additional Past Medical History / Comment(s): Hx. of CML, Hx. of Shingles L eye,

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


History of Any Multi-Drug Resistant Organisms: None Reported


Past Surgical History: Cholecystectomy, Heart Catheterization With Stent, Hernia

Repair, Orthopedic Surgery


Additional Past Surgical History / Comment(s): Thyroid surgery,Vasectomy, R 

ankle surgery, Sinus surgery, Basal cell R shoulder, Pyloroplasty & Vagotomy, 

Hemorrhoidectomy, Cataract surgery & bx. of inner thigh.


Past Anesthesia/Blood Transfusion Reactions: No Reported Reaction


Date of Last Stent Placement:: 2022


Past Psychological History: No Psychological Hx Reported


Smoking Status: Former smoker


Past Alcohol Use History: None Reported


Additional Past Alcohol Use History / Comment(s): Quit in 1967


Past Drug Use History: None Reported





- Past Family History


  ** Mother


Family Medical History: Cancer, Pulmonary Embolus





  ** Father


Family Medical History: Cancer





  ** Brother(s)


Family Medical History: Cancer





  ** Son(s)


Family Medical History: Cancer





Medications and Allergies


                                Home Medications











 Medication  Instructions  Recorded  Confirmed  Type


 


Ascorbic Acid [Vitamin C] 1,000 mg PO DAILY 08/21/19 12/12/22 History


 


Aspirin 81 mg PO DAILY 08/21/19 12/12/22 History


 


Cholecalciferol (Vitamin D3) 2,000 unit PO DAILY 08/21/19 12/12/22 History





[Vitamin D3]    


 


Losartan [Cozaar] 50 mg PO DAILY 08/21/19 12/12/22 History


 


Metoprolol Tartrate [Lopressor] 25 mg PO BID 08/21/19 12/12/22 History


 


Nitroglycerin Sl Tabs [Nitrostat] 0.4 mg SUBLINGUAL Q5M PRN 08/21/19 12/12/22 

History


 


Omeprazole [PriLOSEC] 40 mg PO DAILY 08/21/19 12/12/22 History


 


Ubidecarenone [Co Q-10] 100 mg PO DAILY 08/21/19 12/12/22 History


 


valACYclovir HCL [Valtrex] 500 mg PO TID 08/21/19 12/12/22 History


 


Cardiocentrum 2 tab PO DAILY 09/02/21 12/12/22 History


 


Dasatinib [Sprycel] 100 mg PO 0300 09/02/21 12/12/22 History


 


Docusate [Colace] 100 mg PO BID 09/02/21 12/12/22 History


 


Albuterol Inhaler [Ventolin Hfa 1 - 2 puff INHALATION Q6H PRN 12/07/22 12/12/22 

History





Inhaler]    


 


Fluticasone/Vilanterol [Breo 1 inhalation INHALATION QID PRN 12/07/22 12/12/22 

History





Ellipta 200-25 Mcg Inhaler]    


 


Cranberry Fruit [Cranberry] 465 mg PO DAILY 12/12/22 12/12/22 History


 


Furosemide [Lasix] 40 mg PO DAILY PRN 12/12/22 12/12/22 History


 


Prosymbiotic 1 tab PO BID 12/12/22 12/12/22 History


 


Quercetin 800 mg PO BID 12/12/22 12/12/22 History


 


Zinc Gluconate [Zinc] 50 mg PO DAILY 12/12/22 12/12/22 History








                                    Allergies











Allergy/AdvReac Type Severity Reaction Status Date / Time


 


adhesive tape Allergy  Rash/Hives,"paper Verified 12/11/22 19:57





   tape is ok"  


 


amoxicillin [From Augmentin] Allergy  Rash/Hives Verified 12/11/22 19:57


 


atorvastatin [From Lipitor] Allergy  Muscle Verified 12/12/22 08:22





   Aches  


 


budesonide [From Symbicort] Allergy  Vision Verified 12/11/22 19:57





   issues  


 


ciprofloxacin Allergy  Muscle Verified 12/11/22 19:57





   aches  


 


clavulanic acid Allergy  Rash/Hives Verified 12/11/22 19:57





[From Augmentin]     


 


doxycycline Allergy  Rectal Verified 12/11/22 19:57





   Bleeding  


 


enalapril Allergy  Cough Verified 12/11/22 19:57


 


ezetimibe [From Vytorin] Allergy  Muscle Verified 12/12/22 08:23





   Aches  


 


fluticasone Allergy  Irregular Verified 12/11/22 19:57





[From Advair Diskus]   heart rate  


 


formoterol [From Symbicort] Allergy  Vision Verified 12/11/22 19:57





   issues  


 


mold Allergy  Rash/Hives Verified 12/11/22 19:57


 


salmeterol Allergy  Irregular Verified 12/11/22 19:57





[From Advair Diskus]   heart rate  


 


simvastatin [From Vytorin] Allergy  Muscle Verified 12/12/22 08:23





   Aches  


 


Statins-HMG-CoA Reductase Allergy  Muscle Verified 12/11/22 19:57





Inhibitor   aches  





[Statins-Hmg-Coa Reductase     





Inhibitor]     


 


Cotton material Allergy  itching, Uncoded 12/11/22 19:57





   hives  


 


tasinga Allergy  pancreatiti Uncoded 12/11/22 19:57





   s  














Physical Exam


Vitals: 


                                   Vital Signs











  Temp Pulse Pulse Resp BP BP Pulse Ox


 


 12/13/22 07:33        98


 


 12/13/22 05:00  98.8 F   74  16   113/61  95


 


 12/12/22 23:56        94 L


 


 12/12/22 22:37  98.3 F      


 


 12/12/22 20:53  101.3 F H   69  16   107/53  94 L


 


 12/12/22 20:28  103.3 F H  71   17  108/53   93 L


 


 12/12/22 19:35  101.0 F H  75   18  116/73   94 L


 


 12/12/22 14:27   73   18  123/67   99


 


 12/12/22 12:38  100.6 F H  65   16  116/57   95


 


 12/12/22 12:24   70   16    100








                                Intake and Output











 12/12/22 12/13/22 12/13/22





 22:59 06:59 14:59


 


Intake Total  540 


 


Balance  540 


 


Intake:   


 


  Oral  540 


 


Other:   


 


  Weight 96.162 kg  














Results





                                 12/11/22 21:20





                                 12/13/22 08:22


                          Comprehensive Metabolic Panel











  12/13/22 Range/Units





  08:22 


 


Creatinine  1.42 H  (0.66-1.25)  mg/dL








                               Current Medications











Generic Name Dose Route Start Last Admin





  Trade Name Freq  PRN Reason Stop Dose Admin


 


Acetaminophen  650 mg  12/12/22 00:13  12/13/22 11:11





  Acetaminophen Tab 325 Mg Tab  PO   650 mg





  Q6HR PRN   Administration





  Mild Pain or Fever > 100.5  


 


Hydrocodone Bitart/Acetaminophen  1 each  12/12/22 00:13  12/12/22 09:21





  Hydrocodone/Apap 5-325mg 1 Each Tab  PO   1 each





  Q4HR PRN   Administration





  Moderate Pain (Scale 4 to 6)  


 


Albuterol Sulfate  2.5 mg  12/13/22 07:55 





  Albuterol Nebulized 2.5 Mg/3 Ml  INHALATION  





  RT-Q6H PRN  





  Shortness Of Breath  


 


Aspirin  81 mg  12/13/22 09:00  12/13/22 09:47





  Aspirin 81 Mg  PO   81 mg





  DAILY KIET   Administration


 


Cholecalciferol  50 mcg  12/13/22 10:00  12/13/22 09:47





  Cholecalciferol 25 Mcg (1000 Iu) Tablet  PO   50 mcg





  DAILY KIET   Administration


 


Docusate Sodium  100 mg  12/13/22 09:00  12/13/22 09:47





  Docusate 100 Mg Cap  PO   100 mg





  BID KIET   Administration


 


Heparin Sodium (Porcine)  5,000 unit  12/12/22 08:00  12/13/22 09:46





  Heparin Sodium,Porcine/Pf 5,000 Unit/0.5 Ml Syringe  SQ   5,000 unit





  Q8HR KIET   Administration


 


Ceftriaxone Sodium 2 gm/  50 mls @ 100 mls/hr  12/12/22 09:00  12/12/22 08:25





  Sodium Chloride  IVPB  12/15/22 09:29  100 mls/hr





  Q24HR KIET   Administration





  Protocol  


 


Metoprolol Tartrate  25 mg  12/13/22 09:00  12/13/22 09:47





  Metoprolol Tartrate 25 Mg Tab  PO   25 mg





  BID KIET   Administration


 


Miscellaneous Information  1 each  12/11/22 23:56 





  Pneumonia Protocol Utilized 1 Each Misc  PO  





  ONCE PRN  





  Per Protocol  


 


Naloxone HCl  0.2 mg  12/12/22 00:13 





  Naloxone 0.4 Mg/Ml 1 Ml Vial  IV  





  Q2M PRN  





  Opioid Reversal  


 


Non-Formulary Medication  1 inhalation  12/13/22 07:55 





  Fluticasone/Vilanterol [Breo Ellipta 200-25 Mcg Inhaler]  INHALATION  





  QID PRN  





  Shortness Of Breath  


 


Ondansetron HCl  4 mg  12/12/22 00:13  12/12/22 19:27





  Ondansetron 4 Mg/2 Ml Vial  IVP   4 mg





  Q8HR PRN   Administration





  Nausea And Vomiting  


 


Pantoprazole Sodium  40 mg  12/13/22 07:30  12/13/22 09:47





  Pantoprazole 40 Mg Tablet  PO   40 mg





  [id number] KIET   Administration








                                Intake and Output











 12/12/22 12/13/22 12/13/22





 22:59 06:59 14:59


 


Intake Total  540 


 


Balance  540 


 


Intake:   


 


  Oral  540 


 


Other:   


 


  Weight 96.162 kg  








                                        





                                 12/11/22 21:20 





                                 12/13/22 08:22

## 2022-12-13 NOTE — P.PN
Subjective


Progress Note Date: 12/13/22


Principal diagnosis: 





fevers


Hospital Course: 


77-year-old male with history of CML, coronary artery disease, aortic valve 

stenosis presented with fever and generalized weakness 4 days after left heart 

cath.  He did not have any stents deployed during that time.  In the ED, patient

was febrile.  CT abdomen and pelvis showed mild pericardial effusion with no christiano

nge, minimal stranding around mid and proximal sigmoid colon, Bilateral 

perinephric fat stranding.  Chest x-ray showed left lower lobe opacity and 

concerning for pneumonia, mild vascular congestion.  Blood cultures came back 

positive for MSSA.  ID consulted, MORGAN ordered.  Oncology and cardiology also 

consulted.





Subjective: 


Patient seen and examined at bedside.  No acute events overnight.  He remains 

intermittently febrile.  He denies any worsening chest pain, shortness of 

breath, abdominal pain, nausea, vomiting, diarrhea, constipation, or urinary 

complaints.  He does have chills at the moment.





Pertinent positives and negatives as discussed above, a complete review of 

systems was performed and all other systems are negative.








Vitals Signs Reviewed. 





General: non toxic, no distress, appears at stated age


Derm: warm, dry


Head: atraumatic, normocephalic, symmetric


Eyes: EOMI, no lid lag, anicteric sclera


Mouth: no lip lesion, mucus membranes moist


Cardiovascular: S1S2 reg, systolic murmur


Lungs: CTA bilateral, no rhonchi, no rales , no accessory muscle use


Abdominal: soft,  mild tender to palpation in the epigastric region, no 

guarding, no appreciable organomegaly


Ext: no gross muscle atrophy, no edema, no contractures


Neuro:  CN II-XI grossly intact, no focal neuro deficits


Psych: Alert, oriented, appropriate affect 








Assessment and Plan:


MSSA bacteremia


Community acquired pneumonia 


-Repeat blood cultures pending


-Continue ceftriaxone and azithromycin, ID consulted


-Concern for endocarditis given recent heart cath


-MORGAN pending, cardiology consulted





Acute kidney injury, nonoliguric


-Continue to monitor urine output


-Likely prerenal


-Echo pending, consider fluids if normal systolic function and worsening renal 

function





Chronic conditions 


CML - oncology consulted, patient is currently on immunotherapy


h/o CAD s/p 3 stents , recent left heart cath no stent needed


recent removal of basal cell cancer left nose


Aortic valve stenosis








DVT PPX heparin sc tid 





Full code





Anticipated discharge place: Pending clinical course


Anticipated discharge time: Pending clinical course








Objective





- Vital Signs


Vital signs: 


                                   Vital Signs











Temp  98.8 F   12/13/22 05:00


 


Pulse  74   12/13/22 05:00


 


Resp  16   12/13/22 05:00


 


BP  113/61   12/13/22 05:00


 


Pulse Ox  98   12/13/22 07:33


 


FiO2      








                                 Intake & Output











 12/12/22 12/13/22 12/13/22





 18:59 06:59 18:59


 


Intake Total  540 


 


Balance  540 


 


Weight  96.162 kg 


 


Intake:   


 


  Oral  540 














- Labs


CBC & Chem 7: 


                                 12/11/22 21:20





                                 12/13/22 08:22


Labs: 


                  Abnormal Lab Results - Last 24 Hours (Table)











  12/13/22 Range/Units





  08:22 


 


Creatinine  1.42 H  (0.66-1.25)  mg/dL








                      Microbiology - Last 24 Hours (Table)











 12/12/22 00:15 Blood Culture Gram Stain - Preliminary





 Blood Blood Culture - Preliminary





    Presumptive Staph aureus


 


 12/11/22 23:59 Blood Culture Gram Stain - Preliminary





 Blood Blood Culture - Preliminary





    Staphylococcus aureus


 


 12/11/22 23:59 Blood Culture - Final





 Blood 


 


 12/12/22 00:15 Blood Culture - Final





 Blood

## 2022-12-13 NOTE — P.CONS
History of Present Illness





- Reason for Consult


Consult date: 12/13/22


Staphylococcus aureus bacteremia


Requesting physician: Fernando Walton





- Chief Complaint


weakness and not feeling well x few days





- History of Present Illness


Patient is a 77-year-old  male with multiple comorbidities including 

coronary artery disease CML cholecystitis patient was brought into the ER for 

evaluation of fever and generalized weakness in this patient who recently did 

have a heart cath for despite visiting his hospital but no stents were deployed 

patient has been complaining of generalized weakness predominantly no energy 

patient denies having any headache or URI symptoms no chest pain or shortness of

breath he did have some occasional cough but no sputum production denies any 

choking on the food he did have some nausea but no vomiting no abdominal pain or

any diarrhea with the symptoms the patient was evaluated by ER physician on 

arrival to the ER the patient did have a fever of 100.3 and subsequently he did 

spike a fever of 103.3 F patient was not hypoxic or need for supplemental 

oxygen he did have a normal white count kidney function has been normal liver 

enzymes are normal urine is negative influenza COVID and RSV PCR negative p

atient did have a chest x-ray concerning for possible left lower lobe opacity he

also have a CT of abdominal pelvis did not show any acute abnormality patient 

was started on Rocephin and Zithromax subsequently blood culture came back 

positive with gram-positive cocci vancomycin was added infectious disease was 

consulted for further management of antibiotic therapy








Review of Systems


Positive point has been  mentioned in the HPI rest of the systems are negative








Past Medical History


Past Medical History: Coronary Artery Disease (CAD), Cancer, GERD/Reflux, 

Hypertension, Skin Disorder


Additional Past Medical History / Comment(s): Hx. of CML, Hx. of Shingles L eye,

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


History of Any Multi-Drug Resistant Organisms: None Reported


Past Surgical History: Cholecystectomy, Heart Catheterization With Stent, Hernia

Repair, Orthopedic Surgery


Additional Past Surgical History / Comment(s): Thyroid surgery,Vasectomy, R 

ankle surgery, Sinus surgery, Basal cell R shoulder, Pyloroplasty & Vagotomy, 

Hemorrhoidectomy, Cataract surgery & bx. of inner thigh.


Past Anesthesia/Blood Transfusion Reactions: No Reported Reaction


Date of Last Stent Placement:: 2022


Past Psychological History: No Psychological Hx Reported


Smoking Status: Former smoker


Past Alcohol Use History: None Reported


Additional Past Alcohol Use History / Comment(s): Quit in 1967


Past Drug Use History: None Reported





- Past Family History


  ** Mother


Family Medical History: Cancer, Pulmonary Embolus





  ** Father


Family Medical History: Cancer





  ** Brother(s)


Family Medical History: Cancer





  ** Son(s)


Family Medical History: Cancer





Medications and Allergies


                                Home Medications











 Medication  Instructions  Recorded  Confirmed  Type


 


Ascorbic Acid [Vitamin C] 1,000 mg PO DAILY 08/21/19 12/12/22 History


 


Aspirin 81 mg PO DAILY 08/21/19 12/12/22 History


 


Cholecalciferol (Vitamin D3) 2,000 unit PO DAILY 08/21/19 12/12/22 History





[Vitamin D3]    


 


Losartan [Cozaar] 50 mg PO DAILY 08/21/19 12/12/22 History


 


Metoprolol Tartrate [Lopressor] 25 mg PO BID 08/21/19 12/12/22 History


 


Nitroglycerin Sl Tabs [Nitrostat] 0.4 mg SUBLINGUAL Q5M PRN 08/21/19 12/12/22 

History


 


Omeprazole [PriLOSEC] 40 mg PO DAILY 08/21/19 12/12/22 History


 


Ubidecarenone [Co Q-10] 100 mg PO DAILY 08/21/19 12/12/22 History


 


valACYclovir HCL [Valtrex] 500 mg PO TID 08/21/19 12/12/22 History


 


Cardiocentrum 2 tab PO DAILY 09/02/21 12/12/22 History


 


Docusate [Colace] 100 mg PO BID 09/02/21 12/12/22 History


 


Albuterol Inhaler [Ventolin Hfa 1 - 2 puff INHALATION Q6H PRN 12/07/22 12/12/22 

History





Inhaler]    


 


Fluticasone/Vilanterol [Breo 1 inhalation INHALATION QID PRN 12/07/22 12/12/22 

History





Ellipta 200-25 Mcg Inhaler]    


 


Cranberry Fruit [Cranberry] 465 mg PO DAILY 12/12/22 12/12/22 History


 


Furosemide [Lasix] 40 mg PO DAILY PRN 12/12/22 12/12/22 History


 


Prosymbiotic 1 tab PO BID 12/12/22 12/12/22 History


 


Quercetin 800 mg PO BID 12/12/22 12/12/22 History


 


Zinc Gluconate [Zinc] 50 mg PO DAILY 12/12/22 12/12/22 History


 


ceFAZolin [Kefzol] 2 gm IVP Q8HR #42 each 12/17/22  Rx


 


Pantoprazole [Protonix] 40 mg PO DAILY #30 tab 12/20/22  Rx








                                    Allergies











Allergy/AdvReac Type Severity Reaction Status Date / Time


 


adhesive tape Allergy  Rash/Hives,"paper Verified 12/11/22 19:57





   tape is ok"  


 


amoxicillin [From Augmentin] Allergy  Rash/Hives Verified 12/11/22 19:57


 


atorvastatin [From Lipitor] Allergy  Muscle Verified 12/12/22 08:22





   Aches  


 


budesonide [From Symbicort] Allergy  Vision Verified 12/11/22 19:57





   issues  


 


ciprofloxacin Allergy  Muscle Verified 12/11/22 19:57





   aches  


 


clavulanic acid Allergy  Rash/Hives Verified 12/11/22 19:57





[From Augmentin]     


 


doxycycline Allergy  Rectal Verified 12/11/22 19:57





   Bleeding  


 


enalapril Allergy  Cough Verified 12/11/22 19:57


 


ezetimibe [From Vytorin] Allergy  Muscle Verified 12/12/22 08:23





   Aches  


 


fluticasone Allergy  Irregular Verified 12/11/22 19:57





[From Advair Diskus]   heart rate  


 


formoterol [From Symbicort] Allergy  Vision Verified 12/11/22 19:57





   issues  


 


mold Allergy  Rash/Hives Verified 12/11/22 19:57


 


salmeterol Allergy  Irregular Verified 12/11/22 19:57





[From Advair Diskus]   heart rate  


 


simvastatin [From Vytorin] Allergy  Muscle Verified 12/12/22 08:23





   Aches  


 


Statins-HMG-CoA Reductase Allergy  Muscle Verified 12/11/22 19:57





Inhibitor   aches  





[Statins-Hmg-Coa Reductase     





Inhibitor]     


 


nilotinib [From Tasigna] AdvReac  Pancreatiti Verified 12/15/22 10:05





   s  


 


Cotton material Allergy  itching, Uncoded 12/11/22 19:57





   hives  














Physical Exam


Vitals: 


                                   Vital Signs











  Temp Pulse Pulse Resp BP BP Pulse Ox


 


 12/13/22 07:33        98


 


 12/13/22 05:00  98.8 F   74  16   113/61  95


 


 12/12/22 23:56        94 L


 


 12/12/22 22:37  98.3 F      


 


 12/12/22 20:53  101.3 F H   69  16   107/53  94 L


 


 12/12/22 20:28  103.3 F H  71   17  108/53   93 L


 


 12/12/22 19:35  101.0 F H  75   18  116/73   94 L


 


 12/12/22 14:27   73   18  123/67   99


 


 12/12/22 12:38  100.6 F H  65   16  116/57   95


 


 12/12/22 12:24   70   16    100








                                Intake and Output











 12/12/22 12/13/22 12/13/22





 22:59 06:59 14:59


 


Intake Total  540 


 


Balance  540 


 


Intake:   


 


  Oral  540 


 


Other:   


 


  Weight 96.162 kg  











GENERAL DESCRIPTION: elderly male lying in bed, no distress. No tachypnea or 

accessory muscle of respiration use.


HEENT: Shows Pallor , no scleral icterus. Oral mucous membrane is dry. No 

pharyngeal erythema or thrush


NECK: Trachea central, no thyromegaly.


LUNGS: Unlabored breathing. decreased breath sound at the base. No wheeze or 

crackle.


HEART: S1, S2, regular rate and rhythm. No loud murmur


ABDOMEN: Soft, no tenderness , guarding or rigidity, no organomegaly


EXTREMITIES: No edema of feet.


SKIN: No rash, no masses palpable.


NEUROLOGICAL: The patient is awake, alert, oriented x3, mood and affect normal.

















Results


CBC & Chem 7: 


                                 12/18/22 06:12





                                 12/18/22 05:16


Labs: 


                  Abnormal Lab Results - Last 24 Hours (Table)











  12/13/22 Range/Units





  08:22 


 


Creatinine  1.42 H  (0.66-1.25)  mg/dL








                      Microbiology - Last 24 Hours (Table)











 12/11/22 23:59 Blood Culture Gram Stain - Preliminary





 Blood Blood Culture - Preliminary





    Staphylococcus aureus


 


 12/12/22 00:15 Blood Culture Gram Stain - Preliminary





 Blood 


 


 12/11/22 23:59 Blood Culture - Final





 Blood 


 


 12/12/22 00:15 Blood Culture - Final





 Blood 














Assessment and Plan


(1) Bacteremia


Status: Acute   Code(s): R78.81 - BACTEREMIA   SNOMED Code(s): 1983200


   


Plan: 


1patient presented to hospital with fever and generalized weakness now with 

evidence of MSSA bacteremia question of endovascular source patient did have a 

loud systolic murmur, CT abdominal pelvis did not show significant intra 

abdominal pathology and did not mention any consolidation at the lung bases with

the x-ray was suspicious of.


2patient benefit from MORGAN this was discussed with the cardiology team


3blood cultures will be repeated document clearance of bacteremia


4check inflammatory markers


5discontinue Rocephin and vancomycin


6start the patient on cefazolin 2 g every 8 hours patient to have a penicillin 

allergy cannot use naficillin


We will follow on clinical condition and cultures to further adjust medication 

if needed


Thank you for this consultation will follow this patient along with you





Time with Patient: Greater than 30

## 2022-12-14 LAB
ANION GAP SERPL CALC-SCNC: 7.7 MMOL/L (ref 10–18)
BASOPHILS # BLD AUTO: 0.04 X 10*3/UL (ref 0–0.1)
BASOPHILS NFR BLD AUTO: 0.6 %
BUN SERPL-SCNC: 29.8 MG/DL (ref 9–27)
BUN/CREAT SERPL: 24.83 RATIO (ref 12–20)
CALCIUM SPEC-MCNC: 8.6 MG/DL (ref 8.7–10.3)
CHLORIDE SERPL-SCNC: 107 MMOL/L (ref 96–109)
CO2 SERPL-SCNC: 20.3 MMOL/L (ref 20–27.5)
EOSINOPHIL # BLD AUTO: 0.01 X 10*3/UL (ref 0.04–0.35)
EOSINOPHIL NFR BLD AUTO: 0.1 %
ERYTHROCYTE [DISTWIDTH] IN BLOOD BY AUTOMATED COUNT: 2.48 X 10*6/UL (ref 4.4–5.6)
ERYTHROCYTE [DISTWIDTH] IN BLOOD: 15.3 % (ref 11.5–14.5)
GLUCOSE SERPL-MCNC: 132 MG/DL (ref 70–110)
HCT VFR BLD AUTO: 24.6 % (ref 39.6–50)
HGB BLD-MCNC: 8.5 G/DL (ref 13–17)
IMM GRANULOCYTES BLD QL AUTO: 0.3 %
LYMPHOCYTES # SPEC AUTO: 2.24 X 10*3/UL (ref 0.9–5)
LYMPHOCYTES NFR SPEC AUTO: 33.5 %
MCH RBC QN AUTO: 34.3 PG (ref 27–32)
MCHC RBC AUTO-ENTMCNC: 34.6 G/DL (ref 32–37)
MCV RBC AUTO: 99.2 FL (ref 80–97)
MONOCYTES # BLD AUTO: 0.79 X 10*3/UL (ref 0.2–1)
MONOCYTES NFR BLD AUTO: 11.8 %
NEUTROPHILS # BLD AUTO: 3.58 X 10*3/UL (ref 1.8–7.7)
NEUTROPHILS NFR BLD AUTO: 53.7 %
NRBC BLD AUTO-RTO: 0 /100 WBCS (ref 0–0)
PLATELET # BLD AUTO: 159 X 10*3/UL (ref 140–440)
POTASSIUM SERPL-SCNC: 4.3 MMOL/L (ref 3.5–5.5)
SODIUM SERPL-SCNC: 135 MMOL/L (ref 135–145)
WBC # BLD AUTO: 6.68 X 10*3/UL (ref 4.5–10)

## 2022-12-14 RX ADMIN — METOPROLOL TARTRATE SCH MG: 25 TABLET, FILM COATED ORAL at 08:41

## 2022-12-14 RX ADMIN — Medication SCH MCG: at 08:41

## 2022-12-14 RX ADMIN — DOCUSATE SODIUM SCH MG: 100 CAPSULE, LIQUID FILLED ORAL at 21:43

## 2022-12-14 RX ADMIN — HEPARIN SODIUM SCH UNIT: 5000 INJECTION INTRAVENOUS; SUBCUTANEOUS at 16:30

## 2022-12-14 RX ADMIN — HEPARIN SODIUM SCH UNIT: 5000 INJECTION INTRAVENOUS; SUBCUTANEOUS at 23:50

## 2022-12-14 RX ADMIN — METOPROLOL TARTRATE SCH MG: 25 TABLET, FILM COATED ORAL at 21:43

## 2022-12-14 RX ADMIN — DOCUSATE SODIUM SCH MG: 100 CAPSULE, LIQUID FILLED ORAL at 08:41

## 2022-12-14 RX ADMIN — PANTOPRAZOLE SODIUM SCH MG: 40 TABLET, DELAYED RELEASE ORAL at 08:41

## 2022-12-14 RX ADMIN — HEPARIN SODIUM SCH UNIT: 5000 INJECTION INTRAVENOUS; SUBCUTANEOUS at 08:43

## 2022-12-14 RX ADMIN — ASPIRIN 81 MG CHEWABLE TABLET SCH MG: 81 TABLET CHEWABLE at 08:41

## 2022-12-14 NOTE — P.PN
Subjective


Progress Note Date: 12/14/22 (delayed charting seen at approx 11:30)


Patient is a 77-year-old male with history of CML, coronary artery disease, 

aortic valve stenosis presented with fever and generalized weakness 4 days after

left heart cath.  He did not have any stents deployed during that time.  In the 

ED, his T-max was 100.3.  CT abdomen and pelvis showed mild pericardial effusion

with no change, minimal stranding around mid and proximal sigmoid colon, 

Bilateral perinephric fat stranding.  Chest x-ray showed left lower lobe opacity

and concerning for pneumonia, mild vascular congestion.  He was admitted and was

started on IV antibiotics.  Blood cultures came back positive for MSSA.  ID 

consulted and recommended starting patient on Cefazolin, and recommended MORGAN.  

Oncology and cardiology also consulted.  Patient refused MORGAN as he just had one 

done and does not feel necessarily look at him at more risk secondary to his 

pneumonia.





Patient seen and examined at bedside with wife present.  He is feeling much bett

er today.  He denies any shortness of breath or cough.  His fatigue and lower 

extremity cramping has gotten better.





General: nontoxic, no distress, appears at stated age


Derm: warm, dry


Head: atraumatic, normocephalic, symmetric


Eyes: EOMI, no lid lag, anicteric sclera


Mouth: no lip lesion, mucus membranes moist


Cardiovascular: S1S2 reg, grade 3 systolic ejection murmur, positive posterior 

tibial pulse bilateral, 


Lungs: CTA bilateral, no rhonchi, no rales , no accessory muscle use


Abdominal: soft,  nontender to palpation, no guarding, no appreciable 

organomegaly


Ext: no gross muscle atrophy, trace edema, no contractures


Neuro:  CN II-XI grossly intact, no focal neuro deficits


Psych: Alert, oriented, appropriate affect 








Assessment and Plan:


MSSA bacteremia


Community acquired pneumonia 


Severe aortic stenosis


Small pericardial effusion


-Repeat blood cultures negative to date


-Infectious disease recommendations appreciated: Continue with cefalozin


-Patient has refused MORGAN.  Discussed with him and his wife that should his 

repeat blood cultures turn positive I would highly recommend this, they're 

willing to reconsider at that time


-Cardiology signed off





Acute kidney injury, nonoliguric, improved


-Continue to monitor urine output


-Likely prerenal








Chronic conditions 


CML - oncology consulted, patient is currently on immunotherapy


h/o CAD s/p 3 stents , recent left heart cath no stent needed


recent removal of basal cell cancer left nose


Aortic valve stenosis





DVT PPX heparin sc tid 


Full code


Anticipated discharge place: 1-2 days


Anticipated discharge time: Home





                               Active Medications











Generic Name Dose Route Start Last Admin





  Trade Name Freq  PRN Reason Stop Dose Admin


 


Acetaminophen  650 mg  12/12/22 00:13  12/13/22 11:11





  Acetaminophen Tab 325 Mg Tab  PO   650 mg





  Q6HR PRN   Administration





  Mild Pain or Fever > 100.5  


 


Hydrocodone Bitart/Acetaminophen  1 each  12/12/22 00:13  12/13/22 15:30





  Hydrocodone/Apap 5-325mg 1 Each Tab  PO   1 each





  Q4HR PRN   Administration





  Moderate Pain (Scale 4 to 6)  


 


Albuterol Sulfate  2.5 mg  12/13/22 07:55 





  Albuterol Nebulized 2.5 Mg/3 Ml  INHALATION  





  RT-Q6H PRN  





  Shortness Of Breath  


 


Aspirin  81 mg  12/13/22 09:00  12/14/22 08:41





  Aspirin 81 Mg  PO   81 mg





  DAILY KIET   Administration


 


Cholecalciferol  50 mcg  12/13/22 10:00  12/14/22 08:41





  Cholecalciferol 25 Mcg (1000 Iu) Tablet  PO   50 mcg





  DAILY KIET   Administration


 


Docusate Sodium  100 mg  12/13/22 09:00  12/14/22 08:41





  Docusate 100 Mg Cap  PO   100 mg





  BID KIET   Administration


 


Heparin Sodium (Porcine)  5,000 unit  12/12/22 08:00  12/14/22 16:30





  Heparin Sodium,Porcine/Pf 5,000 Unit/0.5 Ml Syringe  SQ   5,000 unit





  Q8HR KIET   Administration


 


Cefazolin Sodium 2 gm/ Sodium  50 mls @ 100 mls/hr  12/13/22 16:00  12/14/22 

16:31





  Chloride  IVPB   100 mls/hr





  Q8HR KIET   Administration





  Protocol  


 


Metoprolol Tartrate  25 mg  12/13/22 09:00  12/14/22 08:41





  Metoprolol Tartrate 25 Mg Tab  PO   25 mg





  BID KIET   Administration


 


Miscellaneous Information  1 each  12/11/22 23:56 





  Pneumonia Protocol Utilized 1 Each Misc  PO  





  ONCE PRN  





  Per Protocol  


 


Naloxone HCl  0.2 mg  12/12/22 00:13 





  Naloxone 0.4 Mg/Ml 1 Ml Vial  IV  





  Q2M PRN  





  Opioid Reversal  


 


Non-Formulary Medication  1 inhalation  12/13/22 07:55 





  Fluticasone/Vilanterol [Breo Ellipta 200-25 Mcg Inhaler]  INHALATION  





  QID PRN  





  Shortness Of Breath  


 


Ondansetron HCl  4 mg  12/12/22 00:13  12/13/22 14:07





  Ondansetron 4 Mg/2 Ml Vial  IVP   4 mg





  Q8HR PRN   Administration





  Nausea And Vomiting  


 


Pantoprazole Sodium  40 mg  12/13/22 07:30  12/14/22 08:41





  Pantoprazole 40 Mg Tablet  PO   40 mg





  [id number] KIET   Administration

















Objective





- Vital Signs


Vital signs: 


                                   Vital Signs











Temp  97.7 F   12/14/22 11:28


 


Pulse  68   12/14/22 11:28


 


Resp  16   12/14/22 11:28


 


BP  119/63   12/14/22 11:28


 


Pulse Ox  98   12/14/22 11:28


 


FiO2      








                                 Intake & Output











 12/13/22 12/14/22 12/14/22





 18:59 06:59 18:59


 


Weight 96.162 kg  


 


Other:   


 


  # Voids  2 














- Labs


CBC & Chem 7: 


                                 12/14/22 06:01





                                 12/14/22 06:01


Labs: 


                  Abnormal Lab Results - Last 24 Hours (Table)











  12/14/22 12/14/22 Range/Units





  06:01 06:01 


 


RBC  2.48 L   (4.40-5.60)  X 10*6/uL


 


Hgb  8.5 L   (13.0-17.0)  g/dL


 


Hct  24.6 L   (39.6-50.0)  %


 


MCV  99.2 H   (80.0-97.0)  fL


 


MCH  34.3 H   (27.0-32.0)  pg


 


RDW  15.3 H   (11.5-14.5)  %


 


Eosinophils #  0.01 L   (0.04-0.35)  X 10*3/uL


 


Anion Gap   7.70 L  (10.00-18.00)  mmol/L


 


BUN   29.8 H  (9.0-27.0)  mg/dL


 


Est GFR (CKD-EPI)NonAf   58.0 L  (60.0-200.0)   


 


BUN/Creatinine Ratio   24.83 H  (12.00-20.00)  Ratio


 


Glucose   132 H  ()  mg/dL


 


Calcium   8.6 L  (8.7-10.3)  mg/dL








                      Microbiology - Last 24 Hours (Table)











 12/12/22 00:15 Blood Culture Gram Stain - Final





 Blood Blood Culture - Final





    Staphylococcus aureus


 


 12/11/22 23:59 Blood Culture Gram Stain - Final





 Blood Blood Culture - Final





    Staphylococcus aureus


 


 12/13/22 08:22 Blood Culture - Preliminary





 Blood    No Growth after 24 hours


 


 12/13/22 08:25 Blood Culture - Preliminary





 Blood    No Growth after 24 hours

## 2022-12-14 NOTE — P.CONS
History of Present Illness





- Reason for Consult


Consult date: 12/13/22


CML, bacteremic


Requesting physician: Fernando Walton





- Chief Complaint


Fever, progressive weakness





- History of Present Illness


Mister Yang is a pleasant 77-year-old male patient of Dr. Yanez, diagnosed 

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  Patient is currently admitted with

fever post cardiac catheterization in the right wrist.  His blood cultures are 

currently positive for staph aureus.  Infectious diseases consult.  Patient 

states he is feeling pretty terrible today.  General complaints, denies nausea 

or vomiting but has no appetite, no chest pain cough or shortness of breath, in 

general he is weak.  He has complaints of right hip pain, started this past week

its progressive and down the leg, especially worse with flexion.  He is also 

noted urinary incontinence for 2 days before admit.





Below is hematological history.





Pt initially seen at [facility] in 2011 for a mild anemia, with Hgb in the 13-14 range,

w/u was negative, and he was followed with observation till 7/11, after which he

continued f/u with his PCP, Dr Cheek. Since early 2015, it was noted that all 

his cell lines were showing an increase on serial blood draws. WBC/plt on 

12/17/14 were 12.9/257, and on 2/18/15 were 25.3, and 446. His Hgb has been in 

the 14-15 range compared to his baseline of 13-13.5.  The pt has a h/o recurrent

zoster infection involving the left eye, and a UTI in mid 2/15. However the 

increase in counts was too persistent to be explained by the same. His WBC d

ifferential had also shown a left shift. He was thus referred for further 

evaluation.


The persistence, and involvement of multiple cell lines raised the possibility 

of a primary MPD, w/u was ordered. His BCR-ABL was positive, confirming CML. 

Started tasigna 4/7/15, but was admitted to [facility] on 4/14/15 with pancreatitis, 

that appeared to be drug induced. Tasigna was stopped, and he improved with co

nservative management, with discharge on 4/16/15.  He continued to have GI 

discomfort and nausea with slow resolution. His GI complaints did resolve. He 

was started on Sprycel in 6/15.  BCR-ABL PCR had shown a slow upward trend early

2016, then stabilized by 12/16. PCR testing was consistently above 1% so, BM Bx 

done 1/4/17, revealed no evidence of progression. H ehas remained on sprycel 

since.  


   


























Review of Systems





10 point review of systems is negative except as stated in HPI





Past Medical History


Past Medical History: Coronary Artery Disease (CAD), Cancer, GERD/Reflux, 

Hypertension, Skin Disorder


Additional Past Medical History / Comment(s): Hx. of CML, Hx. of Shingles L eye,

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


History of Any Multi-Drug Resistant Organisms: None Reported


Past Surgical History: Cholecystectomy, Heart Catheterization With Stent, Hernia

Repair, Orthopedic Surgery


Additional Past Surgical History / Comment(s): Thyroid surgery,Vasectomy, R 

ankle surgery, Sinus surgery, Basal cell R shoulder, Pyloroplasty & Vagotomy, 

Hemorrhoidectomy, Cataract surgery & bx. of inner thigh.


Past Anesthesia/Blood Transfusion Reactions: No Reported Reaction


Date of Last Stent Placement:: 2022


Past Psychological History: No Psychological Hx Reported


Smoking Status: Former smoker


Past Alcohol Use History: None Reported


Additional Past Alcohol Use History / Comment(s): Quit in 1967


Past Drug Use History: None Reported





- Past Family History


  ** Mother


Family Medical History: Cancer, Pulmonary Embolus





  ** Father


Family Medical History: Cancer





  ** Brother(s)


Family Medical History: Cancer





  ** Son(s)


Family Medical History: Cancer





Medications and Allergies


                                Home Medications











 Medication  Instructions  Recorded  Confirmed  Type


 


Ascorbic Acid [Vitamin C] 1,000 mg PO DAILY 08/21/19 12/12/22 History


 


Aspirin 81 mg PO DAILY 08/21/19 12/12/22 History


 


Cholecalciferol (Vitamin D3) 2,000 unit PO DAILY 08/21/19 12/12/22 History





[Vitamin D3]    


 


Losartan [Cozaar] 50 mg PO DAILY 08/21/19 12/12/22 History


 


Metoprolol Tartrate [Lopressor] 25 mg PO BID 08/21/19 12/12/22 History


 


Nitroglycerin Sl Tabs [Nitrostat] 0.4 mg SUBLINGUAL Q5M PRN 08/21/19 12/12/22 

History


 


Omeprazole [PriLOSEC] 40 mg PO DAILY 08/21/19 12/12/22 History


 


Ubidecarenone [Co Q-10] 100 mg PO DAILY 08/21/19 12/12/22 History


 


valACYclovir HCL [Valtrex] 500 mg PO TID 08/21/19 12/12/22 History


 


Cardiocentrum 2 tab PO DAILY 09/02/21 12/12/22 History


 


Dasatinib [Sprycel] 100 mg PO 0300 09/02/21 12/12/22 History


 


Docusate [Colace] 100 mg PO BID 09/02/21 12/12/22 History


 


Albuterol Inhaler [Ventolin Hfa 1 - 2 puff INHALATION Q6H PRN 12/07/22 12/12/22 

History





Inhaler]    


 


Fluticasone/Vilanterol [Breo 1 inhalation INHALATION QID PRN 12/07/22 12/12/22 

History





Ellipta 200-25 Mcg Inhaler]    


 


Cranberry Fruit [Cranberry] 465 mg PO DAILY 12/12/22 12/12/22 History


 


Furosemide [Lasix] 40 mg PO DAILY PRN 12/12/22 12/12/22 History


 


Prosymbiotic 1 tab PO BID 12/12/22 12/12/22 History


 


Quercetin 800 mg PO BID 12/12/22 12/12/22 History


 


Zinc Gluconate [Zinc] 50 mg PO DAILY 12/12/22 12/12/22 History








                                    Allergies











Allergy/AdvReac Type Severity Reaction Status Date / Time


 


adhesive tape Allergy  Rash/Hives,"paper Verified 12/11/22 19:57





   tape is ok"  


 


amoxicillin [From Augmentin] Allergy  Rash/Hives Verified 12/11/22 19:57


 


atorvastatin [From Lipitor] Allergy  Muscle Verified 12/12/22 08:22





   Aches  


 


budesonide [From Symbicort] Allergy  Vision Verified 12/11/22 19:57





   issues  


 


ciprofloxacin Allergy  Muscle Verified 12/11/22 19:57





   aches  


 


clavulanic acid Allergy  Rash/Hives Verified 12/11/22 19:57





[From Augmentin]     


 


doxycycline Allergy  Rectal Verified 12/11/22 19:57





   Bleeding  


 


enalapril Allergy  Cough Verified 12/11/22 19:57


 


ezetimibe [From Vytorin] Allergy  Muscle Verified 12/12/22 08:23





   Aches  


 


fluticasone Allergy  Irregular Verified 12/11/22 19:57





[From Advair Diskus]   heart rate  


 


formoterol [From Symbicort] Allergy  Vision Verified 12/11/22 19:57





   issues  


 


mold Allergy  Rash/Hives Verified 12/11/22 19:57


 


salmeterol Allergy  Irregular Verified 12/11/22 19:57





[From Advair Diskus]   heart rate  


 


simvastatin [From Vytorin] Allergy  Muscle Verified 12/12/22 08:23





   Aches  


 


Statins-HMG-CoA Reductase Allergy  Muscle Verified 12/11/22 19:57





Inhibitor   aches  





[Statins-Hmg-Coa Reductase     





Inhibitor]     


 


Cotton material Allergy  itching, Uncoded 12/11/22 19:57





   hives  


 


tasinga Allergy  pancreatiti Uncoded 12/11/22 19:57





   s  














Physical Exam


Vitals: 


                                   Vital Signs











  Temp Pulse Pulse Resp BP BP Pulse Ox


 


 12/13/22 16:00  97.8 F      


 


 12/13/22 12:49  100.3 F H   77  18   112/63  94 L


 


 12/13/22 07:33        98


 


 12/13/22 05:00  98.8 F   74  16   113/61  95


 


 12/12/22 23:56        94 L


 


 12/12/22 22:37  98.3 F      


 


 12/12/22 20:53  101.3 F H   69  16   107/53  94 L


 


 12/12/22 20:28  103.3 F H  71   17  108/53   93 L


 


 12/12/22 19:35  101.0 F H  75   18  116/73   94 L








                                Intake and Output











 12/13/22 12/13/22 12/13/22





 06:59 14:59 22:59


 


Intake Total 540  


 


Balance 540  


 


Intake:   


 


  Oral 540  


 


Other:   


 


  Weight  96.162 kg 














- Constitutional


General appearance: average body habitus, cooperative, no acute distress





- EENT


Eyes: anicteric sclerae, EOMI


ENT: hearing grossly normal, normal oropharynx





- Neck


Neck: no lymphadenopathy





- Respiratory


Respiratory: bilateral: CTA





- Cardiovascular


Rhythm: regular


Heart sounds: normal: S1, S2


Abnormal Heart Sounds: no systolic murmur, no diastolic murmur, no rub, no S3 

Gallop, no S4 Gallop, no click, no other


  ** leg


Peripheral Edema: bilateral: None





- Gastrointestinal


General gastrointestinal: no absent bowel sounds, no decreased bowel sounds, no 

distended, no hepatomegaly, no hyperactive bowel sounds, normal bowel sounds, no

organomegaly, no rigid, no scaphoid, soft, no splenomegaly, no tenderness, no 

umbilical hernia, no ventral hernia





- Integumentary


Integumentary: normal





- Neurologic


Neurologic: CNII-XII intact





- Musculoskeletal





Right lower extremity/Right hip pain reported, no pain with palpation of the 

superior iliac crest, sacrum or lumbar spine.


Musculoskeletal: generalized weakness





- Psychiatric


Psychiatric: A&O x's 3, appropriate affect, intact judgment & insight





Results


CBC & Chem 7: 


                                 12/14/22 06:01





                                 12/14/22 06:01


Labs: 


                  Abnormal Lab Results - Last 24 Hours (Table)











  12/13/22 Range/Units





  08:22 


 


Creatinine  1.42 H  (0.66-1.25)  mg/dL








                      Microbiology - Last 24 Hours (Table)











 12/12/22 00:15 Blood Culture Gram Stain - Preliminary





 Blood Blood Culture - Preliminary





    Presumptive Staph aureus


 


 12/11/22 23:59 Blood Culture Gram Stain - Preliminary





 Blood Blood Culture - Preliminary





    Staphylococcus aureus


 


 12/11/22 23:59 Blood Culture - Final





 Blood 


 


 12/12/22 00:15 Blood Culture - Final





 Blood 











Chest x-ray: report reviewed


CT scan - abdomen: report reviewed


CT scan - pelvis: report reviewed





Assessment and Plan


(1) CML (chronic myeloid leukemia)


Current Visit: Yes   Status: Chronic   Priority: Low   Code(s): C92.10 - CHRONIC

MYELOID LEUK, BCR/ABL-POSITIVE, NOT ACHIEVE REMIS   SNOMED Code(s): 69344661


   


Plan: 


Agree with treatment of bacteremia.  


Hold Sprycel.  Hold Sprycel until seen in the office by Dr. Dumas.  Patient's wife

verbalized understanding the plan.








Doctor attests:  I performed a history and physical examination of this patient,

developed impression and plan of care.  Discussed with dictator.  I agree with 

dictators note, documented as a scribe.

## 2022-12-14 NOTE — P.PN
Subjective


Progress Note Date: 12/14/22





History of present illness:


This is a 77-year-old male patient of Dr. Bonilla with past medical history of c

oronary artery disease with previous PCI of the LAD and left circumflex in 2005,

aortic stenosis, chronic myeloleukemia, hypertension, hyperlipidemia, 

pericardial effusion and outflow tract obstruction.  Patient was brought in the 

hospital December 7 and underwent cardiac catheterization with Dr. MEGHANA Bonilla 

which revealed left dominant system, 40-45% circumflex disease in the proximal 

portion and another 40% in the distal portion.  Obtuse marginal has a 40% 

narrowing.  LAD that was stented before is widely patent with good flow.  RCA is

nondominant.  He subsequently underwent MORGAN with Dr. Thompson which revealed severe 

aortic stenosis with mean gradient of 42 mmHg and peak systolic velocity of abo

ve 4M/S.  Aortic valve trileaflet.  Moderate mitral regurgitation.  Patient was 

discharged home following procedures in stable condition.  On 12/11, patient 

presented to the emergency center due to fever, weakness, fatigue and abdominal 

discomfort.  Patient has been febrile up to 103.3 fever and found to be 

bacteremic and infectious disease is following.  We have been asked to see the 

patient for MORGAN.





EKG is sinus rhythm with frequent PVCs


CT of the abdomen and pelvis without contrast revealed mild pericardial effusion

without change.  Mild subsegmental atelectasis at the lung bases mostly new.  

Minimal stranding around the mid and proximal sigmoid colon also present 

previously.  Minimal perinephric fat stranding appears to be increased could 

relate to previous episode of obstruction or inflammation.


Chest x-ray reveals left lower lobe airspace opacities concerning for infection.

 Cardiomegaly with mild pulmonary vascular congestion.


WBC 6.1, hemoglobin 10.6, platelet count 217.  Sodium 138, potassium 4.2, 

chloride 109, CO2 21, BUN 18 creatinine 1 and repeat creatinine today of 1.42.  

Liver function tests within normal limits.  Troponin negative 1.  ProBNP 1120. 

Influenza A, influenza B, RSV and Covid not detected.  2 blood cultures positive

for staph aureus


Echocardiogram 11/18/2022 revealed moderate to severe aortic stenosis with mean 

of 39, mild to moderate mitral regurgitation, moderate tricuspid regurgitation, 

RVSP 70.  


Lexiscan stress test 9/16/2022 shows revealed no ischemia





12/14


Patient states he is feeling a little bit better today.  He states less fevers 

and chills.  Patient states that he does not wanT to undergo MORGAN today.  

Discussed with patient that if he changes his mind, we will be available to 

perform a MORGAN when and if he is ready.








Physical examination:


Gen: This is a 77-year-old  male.


VS: Reviewed


HEENT: Head is atraumatic, normocephalic. Pupils equal, round. Sclerae is 

anicteric. 


NECK: Supple. No JVD. No lymphadenopathy. No thyromegaly. 


LUNGS: Clear to auscultation. No wheezes or rhonchi.  No intercostal 

retractions.


HEART: Regular rate and rhythm.  Systolic murmur right sternal border. 


ABDOMEN: Soft. Bowel sounds are present. No masses.  No tenderness.


EXTREMITIES: No pedal edema.  No calf tenderness.


NEUROLOGICAL: Patient is awake, alert and oriented x3. Cranial nerves 2 through 

12 are grossly intact. 





 





Assessment:


Sepsis secondary to Staph aureus bacteremia rule out endocarditis


Possible left lower lobe pneumonia


Acute kidney injury


Known history of severe aortic stenosis


Coronary artery disease status post PCI of the LAD and circumflex


CML


Hypertension


Hyperlipidemia


Chronic pericardial effusion








Plan:


Continue current treatment plan per infectious disease


Patient refuses to undergo MORGAN at this time.


Continue patient's cardiac medications including aspirin 81 mg daily, Lopressor 

25 mg twice daily


Losartan is on hold


Cardiology we will sign off and follow on an as-needed basis.  If placed stent 

changes his mind and would like to undergo MORGAN, please reconsult.





Nurse practitioner note has been reviewed, I agree with documented findings and 

plan of care.  Patient was seen and examined.








Objective





- Vital Signs


Vital signs: 


                                   Vital Signs











Temp  97.9 F   12/14/22 05:47


 


Pulse  76   12/14/22 05:47


 


Resp  22   12/14/22 05:47


 


BP  111/47   12/14/22 05:47


 


Pulse Ox  95   12/14/22 05:47


 


FiO2      








                                 Intake & Output











 12/13/22 12/14/22 12/14/22





 18:59 06:59 18:59


 


Weight 96.162 kg  


 


Other:   


 


  # Voids  2 














- Labs


CBC & Chem 7: 


                                 12/11/22 21:20





                                 12/14/22 06:01


Labs: 


                  Abnormal Lab Results - Last 24 Hours (Table)











  12/13/22 Range/Units





  08:22 


 


Creatinine  1.42 H  (0.66-1.25)  mg/dL








                      Microbiology - Last 24 Hours (Table)











 12/12/22 00:15 Blood Culture Gram Stain - Preliminary





 Blood Blood Culture - Preliminary





    Presumptive Staph aureus


 


 12/11/22 23:59 Blood Culture Gram Stain - Preliminary





 Blood Blood Culture - Preliminary





    Staphylococcus aureus

## 2022-12-15 LAB
ANION GAP SERPL CALC-SCNC: 5 MMOL/L
BUN SERPL-SCNC: 21 MG/DL (ref 9–20)
CALCIUM SPEC-MCNC: 8.7 MG/DL (ref 8.4–10.2)
CHLORIDE SERPL-SCNC: 110 MMOL/L (ref 98–107)
CO2 SERPL-SCNC: 23 MMOL/L (ref 22–30)
ERYTHROCYTE [DISTWIDTH] IN BLOOD BY AUTOMATED COUNT: 2.76 M/UL (ref 4.3–5.9)
ERYTHROCYTE [DISTWIDTH] IN BLOOD: 15.3 % (ref 11.5–15.5)
GLUCOSE SERPL-MCNC: 119 MG/DL (ref 74–99)
HCT VFR BLD AUTO: 27.2 % (ref 39–53)
HGB BLD-MCNC: 9.2 GM/DL (ref 13–17.5)
MCH RBC QN AUTO: 33.5 PG (ref 25–35)
MCHC RBC AUTO-ENTMCNC: 34 G/DL (ref 31–37)
MCV RBC AUTO: 98.6 FL (ref 80–100)
PLATELET # BLD AUTO: 200 K/UL (ref 150–450)
POTASSIUM SERPL-SCNC: 4.6 MMOL/L (ref 3.5–5.1)
SODIUM SERPL-SCNC: 138 MMOL/L (ref 137–145)
WBC # BLD AUTO: 4.1 K/UL (ref 3.8–10.6)

## 2022-12-15 RX ADMIN — HEPARIN SODIUM SCH: 5000 INJECTION INTRAVENOUS; SUBCUTANEOUS at 15:37

## 2022-12-15 RX ADMIN — HEPARIN SODIUM SCH: 5000 INJECTION INTRAVENOUS; SUBCUTANEOUS at 20:29

## 2022-12-15 RX ADMIN — DOCUSATE SODIUM SCH MG: 100 CAPSULE, LIQUID FILLED ORAL at 20:28

## 2022-12-15 RX ADMIN — LOSARTAN POTASSIUM SCH MG: 50 TABLET, FILM COATED ORAL at 09:25

## 2022-12-15 RX ADMIN — Medication SCH MCG: at 09:25

## 2022-12-15 RX ADMIN — HEPARIN SODIUM SCH UNIT: 5000 INJECTION INTRAVENOUS; SUBCUTANEOUS at 09:25

## 2022-12-15 RX ADMIN — ASPIRIN 81 MG CHEWABLE TABLET SCH MG: 81 TABLET CHEWABLE at 09:25

## 2022-12-15 RX ADMIN — METOPROLOL TARTRATE SCH MG: 25 TABLET, FILM COATED ORAL at 09:25

## 2022-12-15 RX ADMIN — ACETAMINOPHEN PRN MG: 325 TABLET, FILM COATED ORAL at 09:24

## 2022-12-15 RX ADMIN — METOPROLOL TARTRATE SCH MG: 25 TABLET, FILM COATED ORAL at 20:28

## 2022-12-15 RX ADMIN — DOCUSATE SODIUM SCH MG: 100 CAPSULE, LIQUID FILLED ORAL at 09:25

## 2022-12-15 RX ADMIN — PANTOPRAZOLE SODIUM SCH MG: 40 TABLET, DELAYED RELEASE ORAL at 09:25

## 2022-12-15 NOTE — P.PN
Subjective


Progress Note Date: 12/15/22


Patient is a 77-year-old male with history of CML, coronary artery disease, 

aortic valve stenosis presented with fever and generalized weakness 4 days after

left heart cath.  He did not have any stents deployed during that time.  In the 

ED, his T-max was 100.3.  CT abdomen and pelvis showed mild pericardial effusion

with no change, minimal stranding around mid and proximal sigmoid colon, Bilate

ral perinephric fat stranding.  Chest x-ray showed left lower lobe opacity and 

concerning for pneumonia, mild vascular congestion.  He was admitted and was 

started on IV antibiotics.  Blood cultures came back positive for MSSA.  ID 

consulted and recommended starting patient on Cefazolin, and recommended MORGAN.  

Oncology and cardiology also consulted.  Patient refused MORGAN as he just had one 

done and does not feel necessarily as just had one completed and feel it is more

risk secondary to his pneumonia.





Patient seen and examined at bedside. No chest pain, no shortness of breath, 

feeling much better, eating and drinking well per son. 





General: nontoxic, no distress, appears at stated age


Derm: warm, dry


Head: atraumatic, normocephalic, symmetric


Eyes: EOMI, no lid lag, anicteric sclera


Mouth: no lip lesion, mucus membranes moist


Cardiovascular: S1S2 reg, grade 3 systolic ejection murmur, positive posterior 

tibial pulse bilateral, 


Lungs: CTA bilateral, no rhonchi, no rales , no accessory muscle use


Abdominal: soft,  nontender to palpation, no guarding, no appreciable 

organomegaly


Ext: no gross muscle atrophy, trace edema, no contractures


Neuro:  CN II-XI grossly intact, no focal neuro deficits


Psych: Alert, oriented, appropriate affect 








Assessment and Plan:


MSSA bacteremia


Community acquired pneumonia 


Severe aortic stenosis


Small pericardial effusion


-Repeat blood cultures negative to date (48 hours)


-Infectious disease recommendations appreciated: Continue with cefalozin, ? need

for iV abx on discharge, awaiting call back from Dr. Gordon 


-Patient has refused MORGAN, doubt endocarditis with how rapidly blood cultures 

cleared. 


-Cardiology signed off





Acute kidney injury, nonoliguric, improved


-Continue to monitor urine output


-Likely prerenal








Chronic conditions 


CML - oncology consulted, patient is currently on immunotherapy, being held 

during treatment of infection 


h/o CAD s/p 3 stents , recent left heart cath no stent needed


recent removal of basal cell cancer left nose


Aortic valve stenosis





DVT PPX heparin sc tid 


Full code


Anticipated discharge place: in AM


Anticipated discharge time: Home with home health 





Active Medications





Acetaminophen (Acetaminophen Tab 325 Mg Tab)  650 mg PO Q6HR PRN


   PRN Reason: Mild Pain or Fever > 100.5


   Last Admin: 12/15/22 09:24 Dose:  650 mg


   


Hydrocodone Bitart/Acetaminophen (Hydrocodone/Apap 5-325mg 1 Each Tab)  1 each 

PO Q4HR PRN


   PRN Reason: Moderate Pain (Scale 4 to 6)


   Last Admin: 12/13/22 15:30 Dose:  1 each


   


Albuterol Sulfate (Albuterol Nebulized 2.5 Mg/3 Ml)  2.5 mg INHALATION RT-Q6H 

PRN


   PRN Reason: Shortness Of Breath


Aspirin (Aspirin 81 Mg)  81 mg PO DAILY [facility]


   Last Admin: 12/15/22 09:25 Dose:  81 mg


   


Cholecalciferol (Cholecalciferol 25 Mcg (1000 Iu) Tablet)  50 mcg PO DAILY [facility]


   Last Admin: 12/15/22 09:25 Dose:  50 mcg


   


Docusate Sodium (Docusate 100 Mg Cap)  100 mg PO BID [facility]


   Last Admin: 12/15/22 09:25 Dose:  100 mg


   


Heparin Sodium (Porcine) (Heparin Sodium,Porcine/Pf 5,000 Unit/0.5 Ml Syringe)  

5,000 unit SQ Q8HR [facility]


   Last Admin: 12/15/22 09:25 Dose:  5,000 unit


   


Cefazolin Sodium 2 gm/ Sodium (Chloride)  50 mls @ 100 mls/hr IVPB Q8HR [facility]; 

Protocol


   Last Admin: 12/15/22 09:24 Dose:  100 mls/hr


   


Losartan Potassium (Losartan 50 Mg Tab)  50 mg PO DAILY [facility]


   Last Admin: 12/15/22 09:25 Dose:  50 mg


   


Metoprolol Tartrate (Metoprolol Tartrate 25 Mg Tab)  25 mg PO BID [facility]


   Last Admin: 12/15/22 09:25 Dose:  25 mg


   


Miscellaneous Information (Pneumonia Protocol Utilized 1 Each Misc)  1 each PO 

ONCE PRN


   PRN Reason: Per Protocol


Naloxone HCl (Naloxone 0.4 Mg/Ml 1 Ml Vial)  0.2 mg IV Q2M PRN


   PRN Reason: Opioid Reversal


Non-Formulary Medication (Fluticasone/Vilanterol [Breo Ellipta 200-25 Mcg 

Inhaler])  1 inhalation INHALATION QID PRN


   PRN Reason: Shortness Of Breath


Ondansetron HCl (Ondansetron 4 Mg/2 Ml Vial)  4 mg IVP Q8HR PRN


   PRN Reason: Nausea And Vomiting


   Last Admin: 12/13/22 14:07 Dose:  4 mg


   


Pantoprazole Sodium (Pantoprazole 40 Mg Tablet)  40 mg PO [id number] KIET


   Last Admin: 12/15/22 09:25 Dose:  40 mg


   











Objective





- Vital Signs


Vital signs: 


                                   Vital Signs











Temp  97.8 F   12/15/22 11:09


 


Pulse  69   12/15/22 11:09


 


Resp  18   12/15/22 11:09


 


BP  140/73   12/15/22 11:09


 


Pulse Ox  98   12/15/22 11:09


 


FiO2      








                                 Intake & Output











 12/14/22 12/15/22 12/15/22





 18:59 06:59 18:59


 


Output Total 5 600 


 


Balance -5 -600 


 


Weight   96.162 kg


 


Output:   


 


  Urine 5 600 


 


Other:   


 


  Voiding Method  Toilet Toilet


 


  # Bowel Movements 1  














- Labs


CBC & Chem 7: 


                                 12/15/22 08:23





                                 12/15/22 08:23


Labs: 


                  Abnormal Lab Results - Last 24 Hours (Table)











  12/15/22 12/15/22 Range/Units





  08:23 08:23 


 


RBC  2.76 L   (4.30-5.90)  m/uL


 


Hgb  9.2 L   (13.0-17.5)  gm/dL


 


Hct  27.2 L   (39.0-53.0)  %


 


Chloride   110 H  ()  mmol/L


 


BUN   21 H  (9-20)  mg/dL


 


Glucose   119 H  (74-99)  mg/dL








                      Microbiology - Last 24 Hours (Table)











 12/13/22 08:22 Blood Culture - Preliminary





 Blood    No Growth after 48 hours


 


 12/13/22 08:25 Blood Culture - Preliminary





 Blood    No Growth after 48 hours


 


 12/14/22 06:01 Blood Culture - Preliminary





 Blood    No Growth after 24 hours


 


 12/11/22 18:02 Sputum Culture - Preliminary





 Sputum 


 


 12/12/22 00:15 Blood Culture Gram Stain - Final





 Blood Blood Culture - Final





    Staphylococcus aureus


 


 12/11/22 23:59 Blood Culture Gram Stain - Final





 Blood Blood Culture - Final





    Staphylococcus aureus

## 2022-12-15 NOTE — P.PN
Subjective


Progress Note Date: 12/15/22


Principal diagnosis: 


MSSA bacteremia





Patient is a 77-year-old  male with multiple comorbidities including 

coronary artery disease CML cholecystitis patient was brought into the ER for 

evaluation of fever and generalized weakness, patient did have evidence of MSSA 

bacteremia.


 on today's evaluation that is 12/15/2022, the patient continues to be afebrile,

the patient is  breathing comfortably on room air.  No chest pain or shortness 

of breath occasional cough no nausea no vomiting no abdominal pain or diarrhea, 

the patient did have hardware in his right ankle area currently no pain swelling

or redness to the right ankle area patient denies having any pain to the c

ervical thoracic or lumbar spine and currently with no open wound








Objective





- Vital Signs


Vital signs: 


                                   Vital Signs











Temp  97.8 F   12/15/22 11:09


 


Pulse  69   12/15/22 11:09


 


Resp  18   12/15/22 11:09


 


BP  140/73   12/15/22 11:09


 


Pulse Ox  98   12/15/22 11:09


 


FiO2      








                                 Intake & Output











 12/14/22 12/15/22 12/15/22





 18:59 06:59 18:59


 


Output Total 5 600 


 


Balance -5 -600 


 


Weight   96.162 kg


 


Output:   


 


  Urine 5 600 


 


Other:   


 


  Voiding Method  Toilet Toilet


 


  # Bowel Movements 1  














- Exam


GENERAL DESCRIPTION: An elderly male lying in bed in no distress





RESPIRATORY SYSTEM: Unlabored breathing , decreased breath sounds at bases





HEART: S1 S2 regular rate and rhythm , loud systolic murmur





ABDOMEN: Soft , no tenderness





EXTREMITIES: No edema feet, right ankle incision is intact and there is no 

swelling no redness no tenderness


Patient did not have any tenderness to cervical thoracic or lumbar spine


No open wound or cellulitis





- Labs


CBC & Chem 7: 


                                 12/15/22 08:23





                                 12/15/22 08:23


Labs: 


                  Abnormal Lab Results - Last 24 Hours (Table)











  12/15/22 12/15/22 Range/Units





  08:23 08:23 


 


RBC  2.76 L   (4.30-5.90)  m/uL


 


Hgb  9.2 L   (13.0-17.5)  gm/dL


 


Hct  27.2 L   (39.0-53.0)  %


 


Chloride   110 H  ()  mmol/L


 


BUN   21 H  (9-20)  mg/dL


 


Glucose   119 H  (74-99)  mg/dL








                      Microbiology - Last 24 Hours (Table)











 12/13/22 08:22 Blood Culture - Preliminary





 Blood    No Growth after 48 hours


 


 12/13/22 08:25 Blood Culture - Preliminary





 Blood    No Growth after 48 hours


 


 12/14/22 06:01 Blood Culture - Preliminary





 Blood    No Growth after 24 hours


 


 12/11/22 18:02 Sputum Culture - Preliminary





 Sputum 


 


 12/12/22 00:15 Blood Culture Gram Stain - Final





 Blood Blood Culture - Final





    Staphylococcus aureus


 


 12/11/22 23:59 Blood Culture Gram Stain - Final





 Blood Blood Culture - Final





    Staphylococcus aureus














Assessment and Plan


(1) Bacteremia


Current Visit: Yes   Status: Acute   Code(s): R78.81 - BACTEREMIA   SNOMED Co

de(s): [id number]


   


Plan: 


1patient presented to hospital with fever and generalized weakness now with 

evidence of MSSA bacteremia question of endovascular source patient did have a 

loud systolic murmur, CT abdominal pelvis did not show significant intra 

abdominal pathology and did not mention any consolidation at the lung bases with

the x-ray was suspicious of.


2patient recently did have MORGAN 3 days before this hospitalization and was 

negative


3blood cultures has been repeated which are negative so far, we will obtain a 

tagged WBC scan , to see it  will localize at any spot that may need further 

workup for the source of infection


4patient to continue with cefazolin 2 g every 8 hours and monitor clinical 

course closely


Time with Patient: Less than 30

## 2022-12-15 NOTE — P.PN
Subjective


Progress Note Date: 12/14/22


Principal diagnosis: 


MSSA bacteremia





Patient is a 77-year-old  male with multiple comorbidities including 

coronary artery disease CML cholecystitis patient was brought into the ER for 

evaluation of fever and generalized weakness, patient did have evidence of MSSA 

bacteremia.


 on today's evaluation that is 12/14/2022, the patient denies having any fever 

or chills, the patient is feeling better currently breathing comfortably.  No 

chest pain or shortness of breath occasional cough no nausea no vomiting no 

abdominal pain or diarrhea








Objective





- Vital Signs


Vital signs: 


                                   Vital Signs











Temp  97.7 F   12/14/22 11:28


 


Pulse  68   12/14/22 11:28


 


Resp  16   12/14/22 11:28


 


BP  119/63   12/14/22 11:28


 


Pulse Ox  98   12/14/22 11:28


 


FiO2      








                                 Intake & Output











 12/13/22 12/14/22 12/14/22





 18:59 06:59 18:59


 


Weight 96.162 kg  


 


Other:   


 


  # Voids  2 














- Exam


GENERAL DESCRIPTION: An elderly male lying in bed in no distress





RESPIRATORY SYSTEM: Unlabored breathing , decreased breath sounds at bases





HEART: S1 S2 regular rate and rhythm , loud systolic murmur





ABDOMEN: Soft , no tenderness





EXTREMITIES: No edema feet








- Labs


CBC & Chem 7: 


                                 12/15/22 08:23





                                 12/15/22 08:23


Labs: 


                  Abnormal Lab Results - Last 24 Hours (Table)











  12/14/22 12/14/22 Range/Units





  06:01 06:01 


 


RBC  2.48 L   (4.40-5.60)  X 10*6/uL


 


Hgb  8.5 L   (13.0-17.0)  g/dL


 


Hct  24.6 L   (39.6-50.0)  %


 


MCV  99.2 H   (80.0-97.0)  fL


 


MCH  34.3 H   (27.0-32.0)  pg


 


RDW  15.3 H   (11.5-14.5)  %


 


Eosinophils #  0.01 L   (0.04-0.35)  X 10*3/uL


 


Anion Gap   7.70 L  (10.00-18.00)  mmol/L


 


BUN   29.8 H  (9.0-27.0)  mg/dL


 


Est GFR (CKD-EPI)NonAf   58.0 L  (60.0-200.0)   


 


BUN/Creatinine Ratio   24.83 H  (12.00-20.00)  Ratio


 


Glucose   132 H  ()  mg/dL


 


Calcium   8.6 L  (8.7-10.3)  mg/dL








                      Microbiology - Last 24 Hours (Table)











 12/12/22 00:15 Blood Culture Gram Stain - Final





 Blood Blood Culture - Final





    Staphylococcus aureus


 


 12/11/22 23:59 Blood Culture Gram Stain - Final





 Blood Blood Culture - Final





    Staphylococcus aureus


 


 12/13/22 08:22 Blood Culture - Preliminary





 Blood    No Growth after 24 hours


 


 12/13/22 08:25 Blood Culture - Preliminary





 Blood    No Growth after 24 hours














Assessment and Plan


(1) Bacteremia


Current Visit: Yes   Status: Acute   Code(s): R78.81 - BACTEREMIA   SNOMED 

Code(s): 0049931


   


Plan: 


1patient presented to hospital with fever and generalized weakness now with 

evidence of MSSA bacteremia question of endovascular source patient did have a 

loud systolic murmur, CT abdominal pelvis did not show significant intra 

abdominal pathology and did not mention any consolidation at the lung bases with

the x-ray was suspicious of.


2patient benefit from MORGAN however the patient has refused as he did have a MORGAN


3blood cultures has been repeated document clearance of bacteremia


4patient to continue with cefazolin 2 g every 8 hours patient to have a 

penicillin allergy cannot use naficillin


 


Time with Patient: Less than 30

## 2022-12-16 LAB
ANION GAP SERPL CALC-SCNC: 9.7 MMOL/L (ref 10–18)
BUN SERPL-SCNC: 21.6 MG/DL (ref 9–27)
BUN/CREAT SERPL: 21.6 RATIO (ref 12–20)
CALCIUM SPEC-MCNC: 8.7 MG/DL (ref 8.7–10.3)
CHLORIDE SERPL-SCNC: 108 MMOL/L (ref 96–109)
CO2 SERPL-SCNC: 22.3 MMOL/L (ref 20–27.5)
ERYTHROCYTE [DISTWIDTH] IN BLOOD BY AUTOMATED COUNT: 2.44 X 10*6/UL (ref 4.4–5.6)
ERYTHROCYTE [DISTWIDTH] IN BLOOD: 15.1 % (ref 11.5–14.5)
GLUCOSE SERPL-MCNC: 98 MG/DL (ref 70–110)
HCT VFR BLD AUTO: 24.7 % (ref 39.6–50)
HGB BLD-MCNC: 8 G/DL (ref 13–17)
MCH RBC QN AUTO: 32.8 PG (ref 27–32)
MCHC RBC AUTO-ENTMCNC: 32.4 G/DL (ref 32–37)
MCV RBC AUTO: 101.2 FL (ref 80–97)
NRBC BLD AUTO-RTO: 0 /100 WBCS (ref 0–0)
PLATELET # BLD AUTO: 183 X 10*3/UL (ref 140–440)
POTASSIUM SERPL-SCNC: 4.2 MMOL/L (ref 3.5–5.5)
SODIUM SERPL-SCNC: 140 MMOL/L (ref 135–145)
WBC # BLD AUTO: 4.22 X 10*3/UL (ref 4.5–10)

## 2022-12-16 RX ADMIN — PANTOPRAZOLE SODIUM SCH MG: 40 TABLET, DELAYED RELEASE ORAL at 08:02

## 2022-12-16 RX ADMIN — HEPARIN SODIUM SCH: 5000 INJECTION INTRAVENOUS; SUBCUTANEOUS at 20:06

## 2022-12-16 RX ADMIN — DOCUSATE SODIUM SCH MG: 100 CAPSULE, LIQUID FILLED ORAL at 08:02

## 2022-12-16 RX ADMIN — DOCUSATE SODIUM SCH MG: 100 CAPSULE, LIQUID FILLED ORAL at 20:05

## 2022-12-16 RX ADMIN — LOSARTAN POTASSIUM SCH MG: 50 TABLET, FILM COATED ORAL at 08:02

## 2022-12-16 RX ADMIN — HEPARIN SODIUM SCH: 5000 INJECTION INTRAVENOUS; SUBCUTANEOUS at 15:40

## 2022-12-16 RX ADMIN — METOPROLOL TARTRATE SCH MG: 25 TABLET, FILM COATED ORAL at 08:02

## 2022-12-16 RX ADMIN — METOPROLOL TARTRATE SCH MG: 25 TABLET, FILM COATED ORAL at 20:05

## 2022-12-16 RX ADMIN — HEPARIN SODIUM SCH: 5000 INJECTION INTRAVENOUS; SUBCUTANEOUS at 07:59

## 2022-12-16 RX ADMIN — Medication SCH MCG: at 08:02

## 2022-12-16 RX ADMIN — ASPIRIN 81 MG CHEWABLE TABLET SCH MG: 81 TABLET CHEWABLE at 08:02

## 2022-12-16 NOTE — NM
EXAMINATION TYPE: NM WBC whole body

 

DATE OF EXAM: 12/16/2022

 

COMPARISON: 8/19/2022

 

HISTORY: Bacteremia

 

TECHNIQUE:  Following administration of 13.9 mCi Tc99m Ceretec.  Images obtained 3 hours post injecti
on.

 

FINDINGS:

Normal physiological tracer activity is noted in the liver and spleen and in the bone marrow of the a
xial and appendicular skeleton.

 

Injection site is noted on the left antecubital fossa.

 

No abnormal radiotracer accumulation is evident.

 

IMPRESSION:

Normal white blood cell scan. No evidence for abnormal tracer activity.

## 2022-12-16 NOTE — P.PN
Subjective


Progress Note Date: 12/16/22


Principal diagnosis: 


MSSA bacteremia





Patient is a 77-year-old  male with multiple comorbidities including 

coronary artery disease CML cholecystitis patient was brought into the ER for 

evaluation of fever and generalized weakness, patient did have evidence of MSSA 

bacteremia.


 on today's evaluation that is 12/16/2022, the patient remains to be afebrile, 

the patient is  breathing comfortably on room air.  No chest pain or shortness 

of breath occasional cough no nausea no vomiting no abdominal pain or diarrhea, 

overall feeling better no new symptoms








Objective





- Vital Signs


Vital signs: 


                                   Vital Signs











Temp  97.6 F   12/16/22 04:27


 


Pulse  69   12/16/22 04:27


 


Resp  18   12/16/22 04:27


 


BP  129/75   12/16/22 04:27


 


Pulse Ox  95   12/16/22 04:27


 


FiO2      








                                 Intake & Output











 12/15/22 12/16/22 12/16/22





 18:59 06:59 18:59


 


Intake Total  700 


 


Balance  700 


 


Weight 96.162 kg  


 


Intake:   


 


  Intake, IV Titration  100 





  Amount   


 


    ceFAZolin 2 gm In Sodium  100 





    Chloride 0.9% 50 ml @ 100   





    mls/hr IVPB Q8HR [facility] Rx#   





    :[id number]   


 


  Oral  600 


 


Other:   


 


  Voiding Method Toilet Toilet 


 


  # Voids 4 3 


 


  # Bowel Movements 1  














- Exam


GENERAL DESCRIPTION: An elderly male lying in bed in no distress





RESPIRATORY SYSTEM: Unlabored breathing , decreased breath sounds at bases





HEART: S1 S2 regular rate and rhythm , loud systolic murmur





ABDOMEN: Soft , no tenderness





EXTREMITIES: No edema feet, right ankle incision is intact and there is no 

swelling no redness no tenderness


Patient did not have any tenderness to cervical thoracic or lumbar spine


No open wound or cellulitis





- Labs


CBC & Chem 7: 


                                 12/16/22 05:09





                                 12/16/22 05:09


Labs: 


                  Abnormal Lab Results - Last 24 Hours (Table)











  12/16/22 12/16/22 Range/Units





  05:09 05:09 


 


WBC   4.22 L  (4.50-10.00)  X 10*3/uL


 


RBC   2.44 L  (4.40-5.60)  X 10*6/uL


 


Hgb   8.0 L  (13.0-17.0)  g/dL


 


Hct   24.7 L  (39.6-50.0)  %


 


MCV   101.2 H  (80.0-97.0)  fL


 


MCH   32.8 H  (27.0-32.0)  pg


 


RDW   15.1 H  (11.5-14.5)  %


 


Anion Gap  9.70 L   (10.00-18.00)  mmol/L


 


BUN/Creatinine Ratio  21.60 H   (12.00-20.00)  Ratio








                      Microbiology - Last 24 Hours (Table)











 12/13/22 08:25 Blood Culture - Preliminary





 Blood    No Growth after 72 hours


 


 12/13/22 08:22 Blood Culture - Preliminary





 Blood    No Growth after 72 hours


 


 12/11/22 18:02 Gram Stain - Preliminary





 Sputum Sputum Culture - Preliminary


 


 12/14/22 06:01 Blood Culture - Preliminary





 Blood    No Growth after 48 hours


 


 12/15/22 05:32 Blood Culture - Preliminary





 Blood    No Growth after 24 hours














Assessment and Plan


(1) Bacteremia


Current Visit: Yes   Status: Acute   Code(s): R78.81 - BACTEREMIA   SNOMED 

Code(s): 5999177


   


Plan: 


1patient presented to hospital with fever and generalized weakness now with 

evidence of MSSA bacteremia question of endovascular source patient did have a 

loud systolic murmur, CT abdominal pelvis did not show significant intra 

abdominal pathology and did not mention any consolidation at the lung bases with

the x-ray was suspicious of.


2patient recently did have MORGAN 3 days before this hospitalization and was 

negative


3blood cultures has been repeated which are negative so far,  tagged WBC scan 

completed today 12/16/2022 and was normal


4patient to continue with cefazolin 2 g every 8 hours with a plan for midline 2

weeks of IV cefazolin and a close outpatient follow-up, will be planning on 

repeating his cultures after completion of his antibiotics to make sure no 

evidence of any recurrence of his bacteremia that may need further workup at 

that point


Time with Patient: Less than 30

## 2022-12-16 NOTE — P.PN
Subjective


Progress Note Date: 12/16/22 (delayed charting )


Patient is a 77-year-old male with history of CML, coronary artery disease, a

ortic valve stenosis presented with fever and generalized weakness 4 days after 

left heart cath.  He did not have any stents deployed during that time.  In the 

ED, his T-max was 100.3.  CT abdomen and pelvis showed mild pericardial effusion

with no change, minimal stranding around mid and proximal sigmoid colon, 

Bilateral perinephric fat stranding.  Chest x-ray showed left lower lobe opacity

and concerning for pneumonia, mild vascular congestion.  He was admitted and was

started on IV antibiotics.  Blood cultures came back positive for MSSA.  ID 

consulted and recommended starting patient on Cefazolin, and recommended MORGAN.  

Oncology and cardiology also consulted.  Patient refused MORGAN as he just had one 

done and does not feel necessarily as just had one completed and feel it is more

risk secondary to his pneumonia.Infectious disease recommend tagged WBC scan to 

rule out other sources of infection.





Patient seen and examined at bedside. Feeling well.  Has already received 

nuclear tracer.  No complaints currently.


The patient that he will be here through Monday to obtain insurance 

authorization for antibiotics and a termination of midline versus PICC line.





General: nontoxic, no distress, appears at stated age


Derm: warm, dry


Head: atraumatic, normocephalic, symmetric


Eyes: EOMI, no lid lag, anicteric sclera


Mouth: no lip lesion, mucus membranes moist


Cardiovascular: S1S2 reg, grade 3 systolic ejection murmur, positive posterior 

tibial pulse bilateral, 


Lungs: CTA bilateral, no rhonchi, no rales , no accessory muscle use


Abdominal: soft,  nontender to palpation, no guarding, no appreciable 

organomegaly


Ext: no gross muscle atrophy, trace edema, no contractures


Neuro:  CN II-XI grossly intact, no focal neuro deficits


Psych: Alert, oriented, appropriate affect 








Assessment and Plan:


MSSA bacteremia


Community acquired pneumonia 


Severe aortic stenosis


Small pericardial effusion


-Repeat blood cultures negative to date (>48 hours)


-Infectious disease recommendations appreciated: Continue with cefalozin, Will 

need 2 or 6 weeks abx on discharge depending on results of tagged WBC scan 


-Patient has refused MORGAN, doubt endocarditis with how rapidly blood cultures 

cleared. 


-Cardiology signed off





Acute kidney injury, nonoliguric, improved


-Continue to monitor urine output


-Likely prerenal





Chronic conditions 


CML - oncology consulted, patient is currently on immunotherapy, being held 

during treatment of infection 


h/o CAD s/p 3 stents , recent left heart cath no stent needed


recent removal of basal cell cancer left nose


Aortic valve stenosis





DVT prophylaxis: Heparin


Discussed with: Patient, family, nursing


Anticipated discharge: on 12/19/22


Anticipated discharge place: home


A total of [35] minutes was spent on the care of this complex patient more than 

50% of the time was spent in counseling and care coordination. 





                               Active Medications











Generic Name Dose Route Start Last Admin





  Trade Name Freq  PRN Reason Stop Dose Admin


 


Acetaminophen  650 mg  12/12/22 00:13  12/15/22 09:24





  Acetaminophen Tab 325 Mg Tab  PO   650 mg





  Q6HR PRN   Administration





  Mild Pain or Fever > 100.5  


 


Hydrocodone Bitart/Acetaminophen  1 each  12/12/22 00:13  12/13/22 15:30





  Hydrocodone/Apap 5-325mg 1 Each Tab  PO   1 each





  Q4HR PRN   Administration





  Moderate Pain (Scale 4 to 6)  


 


Albuterol Sulfate  2.5 mg  12/13/22 07:55 





  Albuterol Nebulized 2.5 Mg/3 Ml  INHALATION  





  RT-Q6H PRN  





  Shortness Of Breath  


 


Aspirin  81 mg  12/13/22 09:00  12/16/22 08:02





  Aspirin 81 Mg  PO   81 mg





  DAILY KIET   Administration


 


Cholecalciferol  50 mcg  12/13/22 10:00  12/16/22 08:02





  Cholecalciferol 25 Mcg (1000 Iu) Tablet  PO   50 mcg





  DAILY KIET   Administration


 


Docusate Sodium  100 mg  12/13/22 09:00  12/16/22 08:02





  Docusate 100 Mg Cap  PO   100 mg





  BID KIET   Administration


 


Heparin Sodium (Porcine)  5,000 unit  12/12/22 08:00  12/16/22 15:40





  Heparin Sodium,Porcine/Pf 5,000 Unit/0.5 Ml Syringe  SQ   Not Given





  Q8HR KIET  


 


Cefazolin Sodium 2 gm/ Sodium  50 mls @ 100 mls/hr  12/13/22 16:00  12/16/22 

08:02





  Chloride  IVPB   100 mls/hr





  Q8HR KIET   Administration





  Protocol  


 


Losartan Potassium  50 mg  12/15/22 09:00  12/16/22 08:02





  Losartan 50 Mg Tab  PO   50 mg





  DAILY KIET   Administration


 


Metoprolol Tartrate  25 mg  12/13/22 09:00  12/16/22 08:02





  Metoprolol Tartrate 25 Mg Tab  PO   25 mg





  BID KIET   Administration


 


Miscellaneous Information  1 each  12/11/22 23:56 





  Pneumonia Protocol Utilized 1 Each Misc  PO  





  ONCE PRN  





  Per Protocol  


 


Naloxone HCl  0.2 mg  12/12/22 00:13 





  Naloxone 0.4 Mg/Ml 1 Ml Vial  IV  





  Q2M PRN  





  Opioid Reversal  


 


Non-Formulary Medication  1 inhalation  12/13/22 07:55 





  Fluticasone/Vilanterol [Breo Ellipta 200-25 Mcg Inhaler]  INHALATION  





  QID PRN  





  Shortness Of Breath  


 


Ondansetron HCl  4 mg  12/12/22 00:13  12/13/22 14:07





  Ondansetron 4 Mg/2 Ml Vial  IVP   4 mg





  Q8HR PRN   Administration





  Nausea And Vomiting  


 


Pantoprazole Sodium  40 mg  12/13/22 07:30  12/16/22 08:02





  Pantoprazole 40 Mg Tablet  PO   40 mg





  [id number] KIET   Administration











   











Objective





- Vital Signs


Vital signs: 


                                   Vital Signs











Temp  98.0 F   12/16/22 12:37


 


Pulse  63   12/16/22 12:37


 


Resp  18   12/16/22 12:37


 


BP  161/72   12/16/22 12:37


 


Pulse Ox  98   12/16/22 12:37


 


FiO2      








                                 Intake & Output











 12/15/22 12/16/22 12/16/22





 18:59 06:59 18:59


 


Intake Total  700 


 


Balance  700 


 


Weight 96.162 kg  


 


Intake:   


 


  Intake, IV Titration  100 





  Amount   


 


    ceFAZolin 2 gm In Sodium  100 





    Chloride 0.9% 50 ml @ 100   





    mls/hr IVPB [facility]   





    :[id number]   


 


  Oral  600 


 


Other:   


 


  Voiding Method Toilet Toilet 


 


  # Voids 4 3 


 


  # Bowel Movements 1  














- Labs


CBC & Chem 7: 


                                 12/16/22 05:09





                                 12/16/22 05:09


Labs: 


                  Abnormal Lab Results - Last 24 Hours (Table)











  12/16/22 12/16/22 Range/Units





  05:09 05:09 


 


WBC   4.22 L  (4.50-10.00)  X 10*3/uL


 


RBC   2.44 L  (4.40-5.60)  X 10*6/uL


 


Hgb   8.0 L  (13.0-17.0)  g/dL


 


Hct   24.7 L  (39.6-50.0)  %


 


MCV   101.2 H  (80.0-97.0)  fL


 


MCH   32.8 H  (27.0-32.0)  pg


 


RDW   15.1 H  (11.5-14.5)  %


 


Anion Gap  9.70 L   (10.00-18.00)  mmol/L


 


BUN/Creatinine Ratio  21.60 H   (12.00-20.00)  Ratio








                      Microbiology - Last 24 Hours (Table)











 12/13/22 08:25 Blood Culture - Preliminary





 Blood    No Growth after 72 hours


 


 12/13/22 08:22 Blood Culture - Preliminary





 Blood    No Growth after 72 hours


 


 12/11/22 18:02 Gram Stain - Preliminary





 Sputum Sputum Culture - Preliminary


 


 12/14/22 06:01 Blood Culture - Preliminary





 Blood    No Growth after 48 hours


 


 12/15/22 05:32 Blood Culture - Preliminary





 Blood    No Growth after 24 hours

## 2022-12-17 PROCEDURE — 05HA33Z INSERTION OF INFUSION DEVICE INTO LEFT BRACHIAL VEIN, PERCUTANEOUS APPROACH: ICD-10-PCS

## 2022-12-17 RX ADMIN — METOPROLOL TARTRATE SCH MG: 25 TABLET, FILM COATED ORAL at 19:47

## 2022-12-17 RX ADMIN — HEPARIN SODIUM SCH: 5000 INJECTION INTRAVENOUS; SUBCUTANEOUS at 19:51

## 2022-12-17 RX ADMIN — Medication SCH MCG: at 09:23

## 2022-12-17 RX ADMIN — LOSARTAN POTASSIUM SCH MG: 50 TABLET, FILM COATED ORAL at 09:23

## 2022-12-17 RX ADMIN — HEPARIN SODIUM SCH: 5000 INJECTION INTRAVENOUS; SUBCUTANEOUS at 14:27

## 2022-12-17 RX ADMIN — HEPARIN SODIUM SCH: 5000 INJECTION INTRAVENOUS; SUBCUTANEOUS at 09:23

## 2022-12-17 RX ADMIN — DOCUSATE SODIUM SCH MG: 100 CAPSULE, LIQUID FILLED ORAL at 09:23

## 2022-12-17 RX ADMIN — METOPROLOL TARTRATE SCH MG: 25 TABLET, FILM COATED ORAL at 09:23

## 2022-12-17 RX ADMIN — DOCUSATE SODIUM SCH MG: 100 CAPSULE, LIQUID FILLED ORAL at 19:47

## 2022-12-17 RX ADMIN — ASPIRIN 81 MG CHEWABLE TABLET SCH MG: 81 TABLET CHEWABLE at 09:23

## 2022-12-17 RX ADMIN — PANTOPRAZOLE SODIUM SCH MG: 40 TABLET, DELAYED RELEASE ORAL at 09:23

## 2022-12-17 NOTE — P.PN
Subjective


Progress Note Date: 12/17/22


Patient is a 77-year-old male with history of CML, coronary artery disease, 

aortic valve stenosis presented with fever and generalized weakness 4 days after

left heart cath.  He did not have any stents deployed during that time.  In the 

ED, his T-max was 100.3.  CT abdomen and pelvis showed mild pericardial effusion

with no change, minimal stranding around mid and proximal sigmoid colon, Bilate

ral perinephric fat stranding.  Chest x-ray showed left lower lobe opacity and 

concerning for pneumonia, mild vascular congestion.  He was admitted and was 

started on IV antibiotics.  Blood cultures came back positive for MSSA.  ID 

consulted and recommended starting patient on Cefazolin, and recommended MORGAN.  

Oncology and cardiology also consulted.  Patient refused MORGAN as he just had one 

done and does not feel necessarily as just had one completed and feel it is more

risk secondary to his pneumonia.Infectious disease recommend tagged WBC scan to 

rule out other sources of infection, which came back negative. Midline 2 weeks 

of IV abx. 





Patient seen and examined at bedside. Doing well, no diarrhea, Wife at bedside 

and all questions answered. 








General: nontoxic, no distress, appears at stated age


Derm: warm, dry


Head: atraumatic, normocephalic, symmetric


Eyes: EOMI, no lid lag, anicteric sclera


Mouth: no lip lesion, mucus membranes moist


Cardiovascular: S1S2 reg, grade 3 systolic ejection murmur, positive posterior 

tibial pulse bilateral, 


Lungs: CTA bilateral, no rhonchi, no rales , no accessory muscle use


Abdominal: soft,  nontender to palpation, no guarding, no appreciable 

organomegaly


Ext: no gross muscle atrophy, trace edema, no contractures


Neuro:  CN II-XI grossly intact, no focal neuro deficits


Psych: Alert, oriented, appropriate affect 








Assessment and Plan:


MSSA bacteremia


Community acquired pneumonia 


Severe aortic stenosis


Small pericardial effusion


-Repeat blood cultures negative to date (>48 hours)


-Infectious disease recommendations appreciated: discussed with Dr. Gordon 

Continue with cefalozin, Will need 2  weeks abx on discharge depending on 

results of tagged WBC scan 


- midine on monday


-Patient has refused MORGAN, doubt endocarditis with how rapidly blood cultures 

cleared. 


-Cardiology signed off





Acute kidney injury, nonoliguric, improved


-Continue to monitor urine output


-Likely prerenal





Chronic conditions 


CML - oncology consulted, patient is currently on immunotherapy, being held 

during treatment of infection 


h/o CAD s/p 3 stents , recent left heart cath no stent needed


recent removal of basal cell cancer left nose


Aortic valve stenosis





DVT prophylaxis: Heparin


Discussed with: Patient, family, nursing


Anticipated discharge: on 12/19/22


Anticipated discharge place: home


A total of 30 minutes was spent on the care of this complex patient more than 

50% of the time was spent in counseling and care coordination. 


                               Active Medications











Generic Name Dose Route Start Last Admin





  Trade Name Freq  PRN Reason Stop Dose Admin


 


Acetaminophen  650 mg  12/12/22 00:13  12/15/22 09:24





  Acetaminophen Tab 325 Mg Tab  PO   650 mg





  Q6HR PRN   Administration





  Mild Pain or Fever > 100.5  


 


Hydrocodone Bitart/Acetaminophen  1 each  12/12/22 00:13  12/13/22 15:30





  Hydrocodone/Apap 5-325mg 1 Each Tab  PO   1 each





  Q4HR PRN   Administration





  Moderate Pain (Scale 4 to 6)  


 


Albuterol Sulfate  2.5 mg  12/13/22 07:55 





  Albuterol Nebulized 2.5 Mg/3 Ml  INHALATION  





  RT-Q6H PRN  





  Shortness Of Breath  


 


Aspirin  81 mg  12/13/22 09:00  12/17/22 09:23





  Aspirin 81 Mg  PO   81 mg





  DAILY KIET   Administration


 


Cholecalciferol  50 mcg  12/13/22 10:00  12/17/22 09:23





  Cholecalciferol 25 Mcg (1000 Iu) Tablet  PO   50 mcg





  DAILY KIET   Administration


 


Docusate Sodium  100 mg  12/13/22 09:00  12/17/22 09:23





  Docusate 100 Mg Cap  PO   100 mg





  BID KIET   Administration


 


Heparin Sodium (Porcine)  5,000 unit  12/12/22 08:00  12/17/22 09:23





  Heparin Sodium,Porcine/Pf 5,000 Unit/0.5 Ml Syringe  SQ   Not Given





  Q8HR KIET  


 


Cefazolin Sodium 2 gm/ Sodium  50 mls @ 100 mls/hr  12/13/22 16:00  12/17/22 

09:23





  Chloride  IVPB   100 mls/hr





  Q8HR KIET   Administration





  Protocol  


 


Losartan Potassium  50 mg  12/15/22 09:00  12/17/22 09:23





  Losartan 50 Mg Tab  PO   50 mg





  DAILY KIET   Administration


 


Metoprolol Tartrate  25 mg  12/13/22 09:00  12/17/22 09:23





  Metoprolol Tartrate 25 Mg Tab  PO   25 mg





  BID KIET   Administration


 


Miscellaneous Information  1 each  12/11/22 23:56 





  Pneumonia Protocol Utilized 1 Each Misc  PO  





  ONCE PRN  





  Per Protocol  


 


Naloxone HCl  0.2 mg  12/12/22 00:13 





  Naloxone 0.4 Mg/Ml 1 Ml Vial  IV  





  Q2M PRN  





  Opioid Reversal  


 


Non-Formulary Medication  1 inhalation  12/13/22 07:55 





  Fluticasone/Vilanterol [Breo Ellipta 200-25 Mcg Inhaler]  INHALATION  





  QID PRN  





  Shortness Of Breath  


 


Ondansetron HCl  4 mg  12/12/22 00:13  12/13/22 14:07





  Ondansetron 4 Mg/2 Ml Vial  IVP   4 mg





  Q8HR PRN   Administration





  Nausea And Vomiting  


 


Pantoprazole Sodium  40 mg  12/13/22 07:30  12/17/22 09:23





  Pantoprazole 40 Mg Tablet  PO   40 mg





  AC-BRKFST KIET   Administration














Objective





- Vital Signs


Vital signs: 


                                   Vital Signs











Temp  98.0 F   12/17/22 12:40


 


Pulse  61   12/17/22 12:40


 


Resp  16   12/17/22 12:40


 


BP  161/79   12/17/22 12:40


 


Pulse Ox  100   12/17/22 12:40


 


FiO2      








                                 Intake & Output











 12/16/22 12/17/22 12/17/22





 18:59 06:59 18:59


 


Intake Total  650 


 


Balance  650 


 


Intake:   


 


  Intake, IV Titration  50 





  Amount   


 


    ceFAZolin 2 gm In Sodium  50 





    Chloride 0.9% 50 ml @ 100   





    mls/hr IVPB Q8HR [facility] Rx#   





    :[id number]   


 


  Oral  600 


 


Other:   


 


  Voiding Method  Toilet Toilet


 


  # Voids 3 3 














- Labs


CBC & Chem 7: 


                                 12/16/22 05:09





                                 12/16/22 05:09


Labs: 


                      Microbiology - Last 24 Hours (Table)











 12/13/22 08:22 Blood Culture - Preliminary





 Blood    No Growth after 96 hours


 


 12/13/22 08:25 Blood Culture - Preliminary





 Blood    No Growth after 96 hours


 


 12/11/22 18:02 Gram Stain - Final





 Sputum Sputum Culture - Final


 


 12/14/22 06:01 Blood Culture - Preliminary





 Blood    No Growth after 72 hours


 


 12/15/22 05:32 Blood Culture - Preliminary





 Blood    No Growth after 48 hours

## 2022-12-17 NOTE — P.PN
Subjective


Progress Note Date: 12/17/22


Principal diagnosis: 


MSSA bacteremia





Patient is a 77-year-old  male with multiple comorbidities including 

coronary artery disease CML cholecystitis patient was brought into the ER for 

evaluation of fever and generalized weakness, patient did have evidence of MSSA 

bacteremia.


 on today's evaluation that is 12/17/2022, the patient continues to be afebrile,

the patient is  breathing comfortably on room air, the patient denies chest pain

or shortness of breath , the patient did have occasional cough no nausea no 

vomiting no abdominal pain or diarrhea, 





Objective





- Vital Signs


Vital signs: 


                                   Vital Signs











Temp  97.6 F   12/17/22 08:40


 


Pulse  63   12/17/22 08:40


 


Resp  18   12/17/22 08:40


 


BP  157/74   12/17/22 08:40


 


Pulse Ox  98   12/17/22 08:40


 


FiO2      








                                 Intake & Output











 12/16/22 12/17/22 12/17/22





 18:59 06:59 18:59


 


Intake Total  650 


 


Balance  650 


 


Intake:   


 


  Intake, IV Titration  50 





  Amount   


 


    ceFAZolin 2 gm In Sodium  50 





    Chloride 0.9% 50 ml @ 100   





    mls/hr IVPB Q8HR [facility] Rx#   





    :[id number]   


 


  Oral  600 


 


Other:   


 


  Voiding Method  Toilet 


 


  # Voids 3 3 














- Exam


GENERAL DESCRIPTION: An elderly male lying in bed in no distress





RESPIRATORY SYSTEM: Unlabored breathing , decreased breath sounds at bases





HEART: S1 S2 regular rate and rhythm , loud systolic murmur





ABDOMEN: Soft , no tenderness





EXTREMITIES: No edema feet, right ankle incision is intact and there is no 

swelling no redness no tenderness


Patient did not have any tenderness to cervical thoracic or lumbar spine


No open wound or cellulitis





- Labs


CBC & Chem 7: 


                                 12/16/22 05:09





                                 12/16/22 05:09


Labs: 


                  Abnormal Lab Results - Last 24 Hours (Table)











  12/16/22 Range/Units





  05:09 


 


Anion Gap  9.70 L  (10.00-18.00)  mmol/L


 


BUN/Creatinine Ratio  21.60 H  (12.00-20.00)  Ratio








                      Microbiology - Last 24 Hours (Table)











 12/11/22 18:02 Gram Stain - Final





 Sputum Sputum Culture - Final


 


 12/14/22 06:01 Blood Culture - Preliminary





 Blood    No Growth after 72 hours


 


 12/15/22 05:32 Blood Culture - Preliminary





 Blood    No Growth after 48 hours


 


 12/13/22 08:25 Blood Culture - Preliminary





 Blood    No Growth after 72 hours


 


 12/13/22 08:22 Blood Culture - Preliminary





 Blood    No Growth after 72 hours














Assessment and Plan


(1) Bacteremia


Current Visit: Yes   Status: Acute   Code(s): R78.81 - BACTEREMIA   SNOMED 

Code(s): 5515203


   


Plan: 


1patient presented to hospital with fever and generalized weakness now with 

evidence of MSSA bacteremia question of endovascular source patient did have a 

loud systolic murmur, CT abdominal pelvis did not show significant intra abdomi

nal pathology and did not mention any consolidation at the lung bases with the 

x-ray was suspicious of.


2patient recently did have MORGAN 3 days before this hospitalization and was 

negative


3blood cultures has been repeated which are negative so far,  tagged WBC scan 

completed today 12/16/2022 and was normal


4patient to continue with cefazolin 2 g every 8 hours with a plan for midline 

and 2 weeks of IV cefazolin on discharge and close outpatient follow-up 

prescription was provided to the RN, once outpatient medical range he'll be able

to go home from ID standpoint


Time with Patient: Less than 30

## 2022-12-18 LAB
ALBUMIN SERPL-MCNC: 3.5 G/DL (ref 3.8–4.9)
ALBUMIN/GLOB SERPL: 1.6 G/DL (ref 1.6–3.17)
ALP SERPL-CCNC: 62 U/L (ref 41–126)
ALT SERPL-CCNC: 37 U/L (ref 10–49)
ANION GAP SERPL CALC-SCNC: 9 MMOL/L (ref 10–18)
AST SERPL-CCNC: 54 U/L (ref 14–35)
BASOPHILS # BLD AUTO: 0.04 X 10*3/UL (ref 0–0.1)
BASOPHILS NFR BLD AUTO: 0.8 %
BUN SERPL-SCNC: 13.2 MG/DL (ref 9–27)
BUN/CREAT SERPL: 15.28 RATIO (ref 12–20)
CALCIUM SPEC-MCNC: 9 MG/DL (ref 8.7–10.3)
CHLORIDE SERPL-SCNC: 108 MMOL/L (ref 96–109)
CO2 SERPL-SCNC: 23.8 MMOL/L (ref 20–27.5)
EOSINOPHIL # BLD AUTO: 0.09 X 10*3/UL (ref 0.04–0.35)
EOSINOPHIL NFR BLD AUTO: 1.9 %
ERYTHROCYTE [DISTWIDTH] IN BLOOD BY AUTOMATED COUNT: 2.51 X 10*6/UL (ref 4.4–5.6)
ERYTHROCYTE [DISTWIDTH] IN BLOOD: 15 % (ref 11.5–14.5)
FERRITIN SERPL-MCNC: 187 NG/ML (ref 22–322)
GLOBULIN SER CALC-MCNC: 2.2 G/DL (ref 1.6–3.3)
GLUCOSE SERPL-MCNC: 116 MG/DL (ref 70–110)
HCT VFR BLD AUTO: 26 % (ref 39.6–50)
HGB BLD-MCNC: 8.4 G/DL (ref 13–17)
IMM GRANULOCYTES BLD QL AUTO: 0.8 %
IRON SERPL-MCNC: 59 UG/DL (ref 65–175)
LYMPHOCYTES # SPEC AUTO: 1.95 X 10*3/UL (ref 0.9–5)
LYMPHOCYTES NFR SPEC AUTO: 41.2 %
MCH RBC QN AUTO: 33.5 PG (ref 27–32)
MCHC RBC AUTO-ENTMCNC: 32.3 G/DL (ref 32–37)
MCV RBC AUTO: 103.6 FL (ref 80–97)
MONOCYTES # BLD AUTO: 0.45 X 10*3/UL (ref 0.2–1)
MONOCYTES NFR BLD AUTO: 9.5 %
NEUTROPHILS # BLD AUTO: 2.16 X 10*3/UL (ref 1.8–7.7)
NEUTROPHILS NFR BLD AUTO: 45.8 %
NRBC BLD AUTO-RTO: 0 /100 WBCS (ref 0–0)
PLATELET # BLD AUTO: 250 X 10*3/UL (ref 140–440)
POTASSIUM SERPL-SCNC: 4.6 MMOL/L (ref 3.5–5.5)
PROT SERPL-MCNC: 5.7 G/DL (ref 6.2–8.2)
SODIUM SERPL-SCNC: 141 MMOL/L (ref 135–145)
TIBC SERPL-MCNC: 260 UG/DL (ref 228–460)
VIT B12 SERPL-MCNC: 531 PG/ML (ref 200–944)
WBC # BLD AUTO: 4.73 X 10*3/UL (ref 4.5–10)

## 2022-12-18 RX ADMIN — METOPROLOL TARTRATE SCH MG: 25 TABLET, FILM COATED ORAL at 20:04

## 2022-12-18 RX ADMIN — LOSARTAN POTASSIUM SCH MG: 50 TABLET, FILM COATED ORAL at 09:18

## 2022-12-18 RX ADMIN — HEPARIN SODIUM SCH: 5000 INJECTION INTRAVENOUS; SUBCUTANEOUS at 09:19

## 2022-12-18 RX ADMIN — Medication SCH MCG: at 09:18

## 2022-12-18 RX ADMIN — HEPARIN SODIUM SCH: 5000 INJECTION INTRAVENOUS; SUBCUTANEOUS at 20:04

## 2022-12-18 RX ADMIN — DOCUSATE SODIUM SCH MG: 100 CAPSULE, LIQUID FILLED ORAL at 20:04

## 2022-12-18 RX ADMIN — ASPIRIN 81 MG CHEWABLE TABLET SCH MG: 81 TABLET CHEWABLE at 09:18

## 2022-12-18 RX ADMIN — DOCUSATE SODIUM SCH MG: 100 CAPSULE, LIQUID FILLED ORAL at 09:18

## 2022-12-18 RX ADMIN — HEPARIN SODIUM SCH: 5000 INJECTION INTRAVENOUS; SUBCUTANEOUS at 16:44

## 2022-12-18 RX ADMIN — METOPROLOL TARTRATE SCH MG: 25 TABLET, FILM COATED ORAL at 09:18

## 2022-12-18 RX ADMIN — PANTOPRAZOLE SODIUM SCH MG: 40 TABLET, DELAYED RELEASE ORAL at 09:18

## 2022-12-18 NOTE — P.PN
Subjective


Progress Note Date: 12/18/22


Principal diagnosis: 


MSSA bacteremia





Patient is a 77-year-old  male with multiple comorbidities including 

coronary artery disease CML cholecystitis patient was brought into the ER for 

evaluation of fever and generalized weakness, patient did have evidence of MSSA 

bacteremia.


 on today's evaluation that is 12/18/2022, the patient remains to be afebrile, 

the patient is  breathing comfortably on room air, the patient denies chest pain

or shortness of breath , the patient did have occasional cough but not bringing 

up any sputum, the patient denies  nausea no vomiting no abdominal pain or 

diarrhea, 





Objective





- Vital Signs


Vital signs: 


                                   Vital Signs











Temp  97.7 F   12/18/22 08:17


 


Pulse  64   12/18/22 08:17


 


Resp  16   12/18/22 08:17


 


BP  151/75   12/18/22 08:17


 


Pulse Ox  99   12/18/22 08:17


 


FiO2      








                                 Intake & Output











 12/17/22 12/18/22 12/18/22





 18:59 06:59 18:59


 


Intake Total 100 650 


 


Balance 100 650 


 


Intake:   


 


  Intake, IV Titration 100 50 





  Amount   


 


    ceFAZolin 2 gm In Sodium 100 50 





    Chloride 0.9% 50 ml @ 100   





    mls/hr IVPB [facility]   





    :[id number]   


 


  Oral  600 


 


Other:   


 


  Voiding Method Toilet Toilet 


 


  # Voids  2 














- Exam


GENERAL DESCRIPTION: An elderly male lying in bed in no distress





RESPIRATORY SYSTEM: Unlabored breathing , decreased breath sounds at bases





HEART: S1 S2 regular rate and rhythm , loud systolic murmur





ABDOMEN: Soft , no tenderness





EXTREMITIES: No edema feet, right ankle incision is intact and there is no 

swelling no redness no tenderness


Patient did not have any tenderness to cervical thoracic or lumbar spine


No open wound or cellulitis





- Labs


CBC & Chem 7: 


                                 12/18/22 06:12





                                 12/18/22 05:16


Labs: 


                  Abnormal Lab Results - Last 24 Hours (Table)











  12/17/22 12/18/22 12/18/22 Range/Units





  05:09 05:16 06:12 


 


RBC    2.51 L  (4.40-5.60)  X 10*6/uL


 


Hgb    8.4 L  (13.0-17.0)  g/dL


 


Hct    26.0 L  (39.6-50.0)  %


 


MCV    103.6 H  (80.0-97.0)  fL


 


MCH    33.5 H  (27.0-32.0)  pg


 


RDW    15.0 H  (11.5-14.5)  %


 


Retic Count    1.95 H  (0.10-1.80)  %


 


Anion Gap   9.00 L   (10.00-18.00)  mmol/L


 


Glucose   116 H   ()  mg/dL


 


Iron   59 L   ()  ug/dL


 


Transferrin   186.0 L   (204.0-354.0)  mg/dL


 


Total Bilirubin   0.20 L   (0.30-1.20)  mg/dL


 


AST   54 H   (14-35)  U/L


 


C-Reactive Protein  4.20 H    (0.00-0.80)  mg/dL


 


Total Protein   5.7 L   (6.2-8.2)  g/dL


 


Albumin   3.5 L   (3.8-4.9)  g/dL








                      Microbiology - Last 24 Hours (Table)











 12/13/22 08:22 Blood Culture - Preliminary





 Blood    No Growth after 120 hours


 


 12/13/22 08:25 Blood Culture - Preliminary





 Blood    No Growth after 120 hours


 


 12/14/22 06:01 Blood Culture - Preliminary





 Blood    No Growth after 96 hours


 


 12/15/22 05:32 Blood Culture - Preliminary





 Blood    No Growth after 72 hours


 


 12/11/22 18:02 Gram Stain - Final





 Sputum Sputum Culture - Final














Assessment and Plan


(1) Bacteremia


Current Visit: Yes   Status: Acute   Code(s): R78.81 - BACTEREMIA   SNOMED 

Code(s): 3970298


   


Plan: 


1patient presented to hospital with fever and generalized weakness now with 

evidence of MSSA bacteremia question of endovascular source patient did have a 

loud systolic murmur, CT abdominal pelvis did not show significant intra 

abdominal pathology and did not mention any consolidation at the lung bases with

the x-ray was suspicious of.


2patient recently did have MORGAN 3 days before this hospitalization and was 

negative


3blood cultures has been repeated which are negative so far,  tagged WBC scan 

completed today 12/16/2022 and was normal


4patient has shown clinical improvement the patient will  continue with 

cefazolin 2 g every 8 hours with a plan for midline and 2 weeks of IV cefazolin 

on discharge and close outpatient follow-up 


Time with Patient: Less than 30

## 2022-12-18 NOTE — P.PN
Subjective


Progress Note Date: 12/18/22


Patient is a 77-year-old male with history of CML, coronary artery disease, 

aortic valve stenosis presented with fever and generalized weakness 4 days after

left heart cath.  He did not have any stents deployed during that time.  In the 

ED, his T-max was 100.3.  CT abdomen and pelvis showed mild pericardial effusion

with no change, minimal stranding around mid and proximal sigmoid colon, Bilate

ral perinephric fat stranding.  Chest x-ray showed left lower lobe opacity and 

concerning for pneumonia, mild vascular congestion.  He was admitted and was 

started on IV antibiotics.  Blood cultures came back positive for MSSA.  ID 

consulted and recommended starting patient on Cefazolin, and recommended MORGAN.  

Oncology and cardiology also consulted.  Patient refused MORGAN as he just had one 

done and does not feel necessarily as just had one completed and feel it is more

risk secondary to his pneumonia.Infectious disease recommend tagged WBC scan to 

rule out other sources of infection, which came back negative. Midline 2 weeks 

of IV abx. 





Patient seen and examined at bedside.  No complaints currently, other than 

recurrent blood draws.  Denies any chest pain, shortness of breath.








General: nontoxic, no distress, appears at stated age


Derm: warm, dry


Head: atraumatic, normocephalic, symmetric


Eyes: EOMI, no lid lag, anicteric sclera


Mouth: no lip lesion, mucus membranes moist


Cardiovascular: S1S2 reg, grade 3 systolic ejection murmur, positive posterior 

tibial pulse bilateral, 


Lungs: CTA bilateral, no rhonchi, no rales , no accessory muscle use


Neuro:  CN II-XI grossly intact, no focal neuro deficits


Psych: Alert, oriented, appropriate affect 








Assessment and Plan:


MSSA bacteremia


Community acquired pneumonia 


Severe aortic stenosis


Small pericardial effusion


-Repeat blood cultures negative to date


-Infectious disease recommendations appreciated: dcefalozin, Will need 2  weeks 

abx on discharge


- midine on monday


-Patient has refused MORGAN, doubt endocarditis with how rapidly blood cultures 

cleared. 


-Cardiology signed off





Acute kidney injury, nonoliguric, improved


-Continue to monitor urine output


-Likely prerenal





Chronic conditions 


CML - oncology consulted, patient is currently on immunotherapy, being held 

during treatment of infection 


h/o CAD s/p 3 stents , recent left heart cath no stent needed


recent removal of basal cell cancer left nose


Aortic valve stenosis





DVT prophylaxis: Heparin


Discussed with: Patient, nursing


Anticipated discharge: on 12/19/22


Anticipated discharge place: home


A total of 30 minutes was spent on the care of this complex patient more than 

50% of the time was spent in counseling and care coordination. 





                               Active Medications











Generic Name Dose Route Start Last Admin





  Trade Name Freq  PRN Reason Stop Dose Admin


 


Acetaminophen  650 mg  12/12/22 00:13  12/15/22 09:24





  Acetaminophen Tab 325 Mg Tab  PO   650 mg





  Q6HR PRN   Administration





  Mild Pain or Fever > 100.5  


 


Hydrocodone Bitart/Acetaminophen  1 each  12/12/22 00:13  12/13/22 15:30





  Hydrocodone/Apap 5-325mg 1 Each Tab  PO   1 each





  Q4HR PRN   Administration





  Moderate Pain (Scale 4 to 6)  


 


Albuterol Sulfate  2.5 mg  12/13/22 07:55 





  Albuterol Nebulized 2.5 Mg/3 Ml  INHALATION  





  RT-Q6H PRN  





  Shortness Of Breath  


 


Aspirin  81 mg  12/13/22 09:00  12/18/22 09:18





  Aspirin 81 Mg  PO   81 mg





  DAILY KIET   Administration


 


Cholecalciferol  50 mcg  12/13/22 10:00  12/18/22 09:18





  Cholecalciferol 25 Mcg (1000 Iu) Tablet  PO   50 mcg





  DAILY KIET   Administration


 


Docusate Sodium  100 mg  12/13/22 09:00  12/18/22 09:18





  Docusate 100 Mg Cap  PO   100 mg





  BID KIET   Administration


 


Heparin Sodium (Porcine)  5,000 unit  12/12/22 08:00  12/18/22 09:19





  Heparin Sodium,Porcine/Pf 5,000 Unit/0.5 Ml Syringe  SQ   Not Given





  Q8HR KIET  


 


Cefazolin Sodium 2 gm/ Sodium  50 mls @ 100 mls/hr  12/13/22 16:00  12/18/22 

09:18





  Chloride  IVPB   100 mls/hr





  Q8HR KIET   Administration





  Protocol  


 


Losartan Potassium  50 mg  12/15/22 09:00  12/18/22 09:18





  Losartan 50 Mg Tab  PO   50 mg





  DAILY KIET   Administration


 


Metoprolol Tartrate  25 mg  12/13/22 09:00  12/18/22 09:18





  Metoprolol Tartrate 25 Mg Tab  PO   25 mg





  BID KIET   Administration


 


Miscellaneous Information  1 each  12/11/22 23:56 





  Pneumonia Protocol Utilized 1 Each Misc  PO  





  ONCE PRN  





  Per Protocol  


 


Naloxone HCl  0.2 mg  12/12/22 00:13 





  Naloxone 0.4 Mg/Ml 1 Ml Vial  IV  





  Q2M PRN  





  Opioid Reversal  


 


Non-Formulary Medication  1 inhalation  12/13/22 07:55 





  Fluticasone/Vilanterol [Breo Ellipta 200-25 Mcg Inhaler]  INHALATION  





  QID PRN  





  Shortness Of Breath  


 


Ondansetron HCl  4 mg  12/12/22 00:13  12/13/22 14:07





  Ondansetron 4 Mg/2 Ml Vial  IVP   4 mg





  Q8HR PRN   Administration





  Nausea And Vomiting  


 


Pantoprazole Sodium  40 mg  12/13/22 07:30  12/18/22 09:18





  Pantoprazole 40 Mg Tablet  PO   40 mg





  AC-BRKFST KIET   Administration














Objective





- Vital Signs


Vital signs: 


                                   Vital Signs











Temp  97.7 F   12/18/22 08:17


 


Pulse  64   12/18/22 08:17


 


Resp  16   12/18/22 08:17


 


BP  151/75   12/18/22 08:17


 


Pulse Ox  99   12/18/22 08:17


 


FiO2      








                                 Intake & Output











 12/17/22 12/18/22 12/18/22





 18:59 06:59 18:59


 


Intake Total 100 650 


 


Balance 100 650 


 


Intake:   


 


  Intake, IV Titration 100 50 





  Amount   


 


    ceFAZolin 2 gm In Sodium 100 50 





    Chloride 0.9% 50 ml @ 100   





    mls/hr IVPB Q8HR [facility] Rx#   





    :[id number]   


 


  Oral  600 


 


Other:   


 


  Voiding Method Toilet Toilet 


 


  # Voids  2 














- Labs


CBC & Chem 7: 


                                 12/18/22 06:12





                                 12/18/22 05:16


Labs: 


                  Abnormal Lab Results - Last 24 Hours (Table)











  12/17/22 12/18/22 12/18/22 Range/Units





  05:09 05:16 06:12 


 


RBC    2.51 L  (4.40-5.60)  X 10*6/uL


 


Hgb    8.4 L  (13.0-17.0)  g/dL


 


Hct    26.0 L  (39.6-50.0)  %


 


MCV    103.6 H  (80.0-97.0)  fL


 


MCH    33.5 H  (27.0-32.0)  pg


 


RDW    15.0 H  (11.5-14.5)  %


 


Retic Count    1.95 H  (0.10-1.80)  %


 


Anion Gap   9.00 L   (10.00-18.00)  mmol/L


 


Glucose   116 H   ()  mg/dL


 


Iron   59 L   ()  ug/dL


 


Transferrin   186.0 L   (204.0-354.0)  mg/dL


 


Total Bilirubin   0.20 L   (0.30-1.20)  mg/dL


 


AST   54 H   (14-35)  U/L


 


C-Reactive Protein  4.20 H    (0.00-0.80)  mg/dL


 


Total Protein   5.7 L   (6.2-8.2)  g/dL


 


Albumin   3.5 L   (3.8-4.9)  g/dL








                      Microbiology - Last 24 Hours (Table)











 12/13/22 08:22 Blood Culture - Preliminary





 Blood    No Growth after 120 hours


 


 12/13/22 08:25 Blood Culture - Preliminary





 Blood    No Growth after 120 hours


 


 12/14/22 06:01 Blood Culture - Preliminary





 Blood    No Growth after 96 hours


 


 12/15/22 05:32 Blood Culture - Preliminary





 Blood    No Growth after 72 hours


 


 12/11/22 18:02 Gram Stain - Final





 Sputum Sputum Culture - Final

## 2022-12-19 VITALS — TEMPERATURE: 97.6 F

## 2022-12-19 VITALS — RESPIRATION RATE: 16 BRPM | HEART RATE: 55 BPM | SYSTOLIC BLOOD PRESSURE: 168 MMHG | DIASTOLIC BLOOD PRESSURE: 81 MMHG

## 2022-12-19 RX ADMIN — HEPARIN SODIUM SCH: 5000 INJECTION INTRAVENOUS; SUBCUTANEOUS at 09:07

## 2022-12-19 RX ADMIN — PANTOPRAZOLE SODIUM SCH MG: 40 TABLET, DELAYED RELEASE ORAL at 09:13

## 2022-12-19 RX ADMIN — ACETAMINOPHEN PRN MG: 325 TABLET, FILM COATED ORAL at 09:13

## 2022-12-19 RX ADMIN — METOPROLOL TARTRATE SCH MG: 25 TABLET, FILM COATED ORAL at 09:13

## 2022-12-19 RX ADMIN — Medication SCH MCG: at 09:13

## 2022-12-19 RX ADMIN — DOCUSATE SODIUM SCH MG: 100 CAPSULE, LIQUID FILLED ORAL at 09:13

## 2022-12-19 RX ADMIN — LOSARTAN POTASSIUM SCH MG: 50 TABLET, FILM COATED ORAL at 09:13

## 2022-12-19 RX ADMIN — ASPIRIN 81 MG CHEWABLE TABLET SCH MG: 81 TABLET CHEWABLE at 09:13

## 2022-12-19 NOTE — P.DS
Providers


Date of admission: 


12/12/22 00:14





Expected date of discharge: 12/19/22


Attending physician: 


Carter Cheng MD





Consults: 





                                        





12/13/22 07:54


Consult Physician Urgent 


   Consulting Provider: Dai Gordon


   Consult Reason/Comments: staph auerus bacterermia, recent cath


   Do you want consulting provider notified?: Yes





12/13/22 08:04


Consult Physician Routine 


   Consulting Provider: Montrell Dumas


   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


   Do you want consulting provider notified?: Yes





12/13/22 10:48


Consult Physician Routine 


   Consulting Provider: Scott Whelan


   Consult Reason/Comments: MORGAN scheduled


   Do you want consulting provider notified?: Yes











Primary care physician: 


Mumtaz Roland





[facility] Course: 


Discharge Diagnosis:


MSSA bacteremia


Community acquired pneumonia 


Severe aortic stenosis


Small pericardial effusion


Acute kidney injury, nonoliguric, improved


CML


h/o CAD s/p 3 stents , recent left heart cath no stent needed


Recent removal of basal cell cancer left nose


Aortic valve stenosis





Hospital Course: 


Patient is a 77-year-old male with history of CML, coronary artery disease, 

aortic valve stenosis presented with fever and generalized weakness 4 days after

left heart cath.  He did not have any stents deployed during that time.  In the 

ED, his T-max was 100.3.  CT abdomen and pelvis showed mild pericardial effusion

with no change, minimal stranding around mid and proximal sigmoid colon, 

Bilateral perinephric fat stranding.  Chest x-ray showed left lower lobe opacity

and concerning for pneumonia, mild vascular congestion.  He was admitted and was

started on IV antibiotics.  Blood cultures came back positive for MSSA.  ID 

consulted and recommended starting patient on Cefazolin, and recommended MORGAN.  

Oncology and cardiology also consulted.  Patient refused MORGAN as he just had one 

done and does not feel necessarily as just had one completed and feel it is more

risk secondary to his pneumonia. Infectious disease recommend tagged WBC scan to

rule out other sources of infection, which came back negative. Midline 2 weeks 

of IV abx.  He continued to well.  He was discharged home in stable condition.





Follow-up: He will complete 2 additional weeks of cefazolin, Dr. Gordon in 1 

week, Dr. uDmas on 12/27, Dr. Roland in week, stay on probiotic while taking IV 

abx, off Spycel until infection is cleared.  





Patient seen and examined at bedside.  Doing well.  No complaints.  No chest 

pain or shortness of breath.





Vital signs reviewed and stable. 


General: nontoxic, no distress, appears at stated age


Derm: warm, dry


Head: atraumatic, normocephalic, symmetric


Eyes: EOMI, no lid lag, anicteric sclera


Mouth: no lip lesion, mucus membranes moist


Cardiovascular: S1S2 reg, no murmur, positive posterior tibial pulse bilateral, 


Lungs: CTA bilateral, no rhonchi, no rales , no accessory muscle use


Abdominal: soft,  nontender to palpation, no guarding, no appreciable 

organomegaly


Ext: no gross muscle atrophy, no edema, no contractures


Neuro:  CN II-XI grossly intact, no focal neuro deficits


Psych: Alert, oriented, appropriate affect 








A total of 37 minutes of time were spent preparing this complex discharge 

summary.


Patient was discharged on 12/19/22. 





Patient Condition at Discharge: Stable





Plan - Discharge Summary


Discharge Rx Participant: Yes


New Discharge Prescriptions: 


New


   RX: ceFAZolin [Kefzol] 2 gm IVP Q8HR #42 each


   RX: Pantoprazole [Protonix] 40 mg PO AC-BRKFST  tab





Continue


   RX: valACYclovir HCL [Valtrex] 500 mg PO TID


   RX: Nitroglycerin Sl Tabs [Nitrostat] 0.4 mg SUBLINGUAL Q5M PRN


     PRN Reason: Pain


   RX: Metoprolol Tartrate [Lopressor] 25 mg PO BID


   RX: Losartan [Cozaar] 50 mg PO DAILY


   RX: Aspirin 81 mg PO DAILY


   RX: Ubidecarenone [Co Q-10] 100 mg PO DAILY


   RX: Omeprazole [PriLOSEC] 40 mg PO DAILY


   RX: Cholecalciferol (Vitamin D3) [Vitamin D3] 2,000 unit PO DAILY


   RX: Ascorbic Acid [Vitamin C] 1,000 mg PO DAILY


   RX: Docusate [Colace] 100 mg PO BID


   Cardiocentrum 2 tab PO DAILY


   RX: Albuterol Inhaler [Ventolin Hfa Inhaler] 1 - 2 puff INHALATION Q6H PRN


     PRN Reason: Shortness Of Breath


   RX: Fluticasone/Vilanterol [Breo Ellipta 200-25 Mcg Inhaler] 1 inhalation 

INHALATION QID PRN


     PRN Reason: Shortness Of Breath


   Quercetin 800 mg PO BID


   RX: Zinc Gluconate [Zinc] 50 mg PO DAILY


   RX: Cranberry Fruit [Cranberry] 465 mg PO DAILY


   RX: Furosemide [Lasix] 40 mg PO DAILY PRN


     PRN Reason: fluid retention


   Prosymbiotic 1 tab PO BID





Discontinued


   Dasatinib [Sprycel] 100 mg PO 0300


Discharge Medication List





RX: Ascorbic Acid [Vitamin C] 1,000 mg PO DAILY 08/21/19 [History]


RX: Aspirin 81 mg PO DAILY 08/21/19 [History]


RX: Cholecalciferol (Vitamin D3) [Vitamin D3] 2,000 unit PO DAILY 08/21/19 

[History]


RX: Losartan [Cozaar] 50 mg PO DAILY 08/21/19 [History]


RX: Metoprolol Tartrate [Lopressor] 25 mg PO BID 08/21/19 [History]


RX: Nitroglycerin Sl Tabs [Nitrostat] 0.4 mg SUBLINGUAL Q5M PRN 08/21/19 

[History]


RX: Omeprazole [PriLOSEC] 40 mg PO DAILY 08/21/19 [History]


RX: Ubidecarenone [Co Q-10] 100 mg PO DAILY 08/21/19 [History]


RX: valACYclovir HCL [Valtrex] 500 mg PO TID 08/21/19 [History]


Cardiocentrum 2 tab PO DAILY 09/02/21 [History]


RX: Docusate [Colace] 100 mg PO BID 09/02/21 [History]


RX: Albuterol Inhaler [Ventolin Hfa Inhaler] 1 - 2 puff INHALATION Q6H PRN 

12/07/22 [History]


RX: Fluticasone/Vilanterol [Breo Ellipta 200-25 Mcg Inhaler] 1 inhalation 

INHALATION QID PRN 12/07/22 [History]


Prosymbiotic 1 tab PO BID 12/12/22 [History]


Quercetin 800 mg PO BID 12/12/22 [History]


RX: Cranberry Fruit [Cranberry] 465 mg PO DAILY 12/12/22 [History]


RX: Furosemide [Lasix] 40 mg PO DAILY PRN 12/12/22 [History]


RX: Zinc Gluconate [Zinc] 50 mg PO DAILY 12/12/22 [History]


RX: ceFAZolin [Kefzol] 2 gm IVP Q8HR #42 each 12/17/22 [Rx]


RX: Pantoprazole [Protonix] 40 mg PO AC-BRKFST  tab 12/19/22 [Rx]








Follow up Appointment(s)/Referral(s): 


Montrell Dumas MD [STAFF PHYSICIAN] - 12/27/22 1:15 pm


Mumtaz Roland MD [Primary Care Provider] - 1-2 days


[facility], [NON-STAFF] - 12/20/22 8:00 am


[facility], [REFERRING] - 12/20/22 8:00 am


($50 weekly for medications


$120 weekly for supplies)


Dai Gordon MD [STAFF PHYSICIAN] - 1 Week


Activity/Diet/Wound Care/Special Instructions: 


Activity:


as tolerated 





Diet: 


heart healthy 





Special Instructions: 


Take probiotic daily for the next 2 weeks while on antibiotics


Off Sprycel until cleared by Dr. Gordon, Dr. Dumas is aware. 


Call Dr. Gordon office or come to the emergency department if issue develop with 

midline. 


Discharge Disposition: HOME WITH HOME HEALTH SERVICES

## 2022-12-23 NOTE — P.PN
Subjective


Progress Note Date: 12/19/22


Principal diagnosis: 


MSSA bacteremia





Patient is a 77-year-old  male with multiple comorbidities including 

coronary artery disease CML cholecystitis patient was brought into the ER for 

evaluation of fever and generalized weakness, patient did have evidence of MSSA 

bacteremia.


 on today's evaluation that is 12/19/2022, the patient denies any fever or any 

chills, the patient is  breathing comfortably on room air, the patient denies 

chest pain or shortness of breath , the patient did have occasional dry cough, 

the patient denies  nausea no vomiting no abdominal pain or diarrhea, 





Objective





- Vital Signs


Vital signs: 


                                   Vital Signs











Temp  97.6 F   12/19/22 12:21


 


Pulse  55 L  12/19/22 12:21


 


Resp  16   12/19/22 12:21


 


BP  168/81   12/19/22 12:21


 


Pulse Ox  98   12/19/22 12:21


 


FiO2      








                                 Intake & Output











 12/18/22 12/19/22 12/19/22





 18:59 06:59 18:59


 


Intake Total 100 650 180


 


Balance 100 650 180


 


Intake:   


 


  Intake, IV Titration 100 50 





  Amount   


 


    ceFAZolin 2 gm In Sodium 100 50 





    Chloride 0.9% 50 ml @ 100   





    mls/hr IVPB Q8HR [facility] Rx#   





    :[id number]   


 


  Oral  600 180


 


Other:   


 


  Voiding Method  Toilet Toilet


 


  # Voids  2 














- Exam


GENERAL DESCRIPTION: An elderly male lying in bed in no distress





RESPIRATORY SYSTEM: Unlabored breathing , decreased breath sounds at bases





HEART: S1 S2 regular rate and rhythm , loud systolic murmur





ABDOMEN: Soft , no tenderness





EXTREMITIES: No edema feet, right ankle incision is intact and there is no 

swelling no redness no tenderness


Patient did not have any tenderness to cervical thoracic or lumbar spine


No open wound or cellulitis





- Labs


CBC & Chem 7: 


                                 12/18/22 06:12





                                 12/18/22 05:16


Labs: 


                      Microbiology - Last 24 Hours (Table)











 12/13/22 08:22 Blood Culture - Final





 Blood    No Growth after 144 hours


 


 12/13/22 08:25 Blood Culture - Final





 Blood    No Growth after 144 hours


 


 12/14/22 06:01 Blood Culture - Preliminary





 Blood    No Growth after 120 hours


 


 12/15/22 05:32 Blood Culture - Preliminary





 Blood    No Growth after 96 hours














Assessment and Plan


(1) Bacteremia


Status: Acute   Code(s): R78.81 - BACTEREMIA   SNOMED Code(s): 6979840


   


Plan: 


1patient presented to hospital with fever and generalized weakness now with 

evidence of MSSA bacteremia question of endovascular source patient did have a 

loud systolic murmur, CT abdominal pelvis did not show significant intra 

abdominal pathology and did not mention any consolidation at the lung bases with

the x-ray was suspicious of.


2patient recently did have MORGAN 3 days before this hospitalization and was 

negative


3blood cultures has been repeated which are negative so far,  tagged WBC scan 

completed today 12/16/2022 and was normal


4patient has shown clinical improvement, plan is to finish a two-week course of

 cefazolin 2 g every 8 hours   and close outpatient follow-up 


Time with Patient: Less than 30

## 2022-12-27 NOTE — CDI
Documentation Clarification Form



Date: 12/27/2022 06:56:30 PM

From: Rebekah Acosta RN, CCDS

Phone: [phone]

MRN: [id number]

Admit Date: 12/12/2022 12:14:00 AM

Patient Name: Bryant Yang

Visit Number: [id number]

Discharge Date:  12/19/2022 06:22:00 PM





ATTENTION: The Clinical Documentation Specialists (CDI) and [facility] Coding Staff 
appreciate your assistance in clarifying documentation. Please respond to the 
clarification below the line at the bottom and electronically sign. The CDI & 
[facility] Coding staff will review the response and follow-up if needed. Please note: 
Queries are made part of the Legal Health Record. If you have any questions, 
please contact the author of this message via ITS.



Dr. Jen Delacruz





There is documentation of bacteremia.  Bacteremia is considered a lab finding. 
Additional clarification regarding bacteremia is requested.





12/13 Cardiology consult: Sepsis secondary to Staph aureus bacteremia rule out 
endocarditis.





Patient history/risk factors: Coronary artery disease Hypertension Cancer, CML



Clinical Indicators: 77-year-old male present with fever and weakness. He had a 
cardiac catheterization however no stents were placed. He is also being treated 
for CML with daily chemotherapy. 

12/11 Vital signs: 139/69 82 16 100.3 98 % RA 

12/12 108/53 71 103.3 93 % RA

12/11 Labs: WBC 6.1

12/11 CXR: Left lower lobe airspace opacity concerning for acute infectious 
process such as pneumonia

12/12 Blood Culture: Staphylococcus aureus



12/19 ID Progress: Fever and generalized weakness now with evidence of MSSA 
bacteremia, 3 repeat blood cultures have been negative, tagged WBC scan 12/`6 
was normal



Treatment:

Vancomycin 1750 MG IVPB 12/13 PTD 12/13-12/14

Rocephin 2 GM 12/12-12/13, Cefazolin 2 GM IVPB Q 8 HRS 12/13-12/19

Zithromax 500MG PO Daily 12/12-12/13





Please provide additional clarification regarding the etiology/cause and/or 
clinical significance of the bacteremia:

[  ]  Bacteremia is related to sepsis

[  X]  Bacteremia is due to infectious process, please specify: __Pneumonia___

[  ]  Other, please specify ________

[  ]  Unable to determine



(Template Last Revised: March 2021)

___________________________________________________________________________

MTDD

## 2022-12-28 ENCOUNTER — HOSPITAL ENCOUNTER (INPATIENT)
Dept: HOSPITAL 47 - EC | Age: 77
LOS: 1 days | Discharge: HOME | DRG: 554 | End: 2022-12-29
Attending: INTERNAL MEDICINE | Admitting: INTERNAL MEDICINE
Payer: MEDICARE

## 2022-12-28 DIAGNOSIS — Z88.8: ICD-10-CM

## 2022-12-28 DIAGNOSIS — Z98.49: ICD-10-CM

## 2022-12-28 DIAGNOSIS — M18.11: Primary | ICD-10-CM

## 2022-12-28 DIAGNOSIS — F17.210: ICD-10-CM

## 2022-12-28 DIAGNOSIS — Z90.49: ICD-10-CM

## 2022-12-28 DIAGNOSIS — E11.9: ICD-10-CM

## 2022-12-28 DIAGNOSIS — Z79.82: ICD-10-CM

## 2022-12-28 DIAGNOSIS — M65.841: ICD-10-CM

## 2022-12-28 DIAGNOSIS — I25.10: ICD-10-CM

## 2022-12-28 DIAGNOSIS — K21.9: ICD-10-CM

## 2022-12-28 DIAGNOSIS — Z79.899: ICD-10-CM

## 2022-12-28 DIAGNOSIS — Z87.19: ICD-10-CM

## 2022-12-28 DIAGNOSIS — Z86.19: ICD-10-CM

## 2022-12-28 DIAGNOSIS — Z91.048: ICD-10-CM

## 2022-12-28 DIAGNOSIS — Z87.01: ICD-10-CM

## 2022-12-28 DIAGNOSIS — C92.10: ICD-10-CM

## 2022-12-28 DIAGNOSIS — F10.20: ICD-10-CM

## 2022-12-28 DIAGNOSIS — Z88.1: ICD-10-CM

## 2022-12-28 DIAGNOSIS — I10: ICD-10-CM

## 2022-12-28 DIAGNOSIS — L03.113: ICD-10-CM

## 2022-12-28 LAB
ALBUMIN SERPL-MCNC: 3.9 G/DL (ref 3.5–5)
ALP SERPL-CCNC: 72 U/L (ref 38–126)
ALT SERPL-CCNC: 9 U/L (ref 4–49)
ANION GAP SERPL CALC-SCNC: 7 MMOL/L
APTT BLD: 24.9 SEC (ref 22–30)
AST SERPL-CCNC: 23 U/L (ref 17–59)
BASOPHILS # BLD AUTO: 0 K/UL (ref 0–0.2)
BASOPHILS NFR BLD AUTO: 1 %
BUN SERPL-SCNC: 21 MG/DL (ref 9–20)
CALCIUM SPEC-MCNC: 9 MG/DL (ref 8.4–10.2)
CHLORIDE SERPL-SCNC: 109 MMOL/L (ref 98–107)
CO2 SERPL-SCNC: 24 MMOL/L (ref 22–30)
EOSINOPHIL # BLD AUTO: 0.1 K/UL (ref 0–0.7)
EOSINOPHIL NFR BLD AUTO: 1 %
ERYTHROCYTE [DISTWIDTH] IN BLOOD BY AUTOMATED COUNT: 3.11 M/UL (ref 4.3–5.9)
ERYTHROCYTE [DISTWIDTH] IN BLOOD: 14.8 % (ref 11.5–15.5)
GLUCOSE SERPL-MCNC: 117 MG/DL (ref 74–99)
HCT VFR BLD AUTO: 30.1 % (ref 39–53)
HGB BLD-MCNC: 10.1 GM/DL (ref 13–17.5)
INR PPP: 1 (ref ?–1.2)
LYMPHOCYTES # SPEC AUTO: 1.7 K/UL (ref 1–4.8)
LYMPHOCYTES NFR SPEC AUTO: 24 %
MCH RBC QN AUTO: 32.6 PG (ref 25–35)
MCHC RBC AUTO-ENTMCNC: 33.7 G/DL (ref 31–37)
MCV RBC AUTO: 96.9 FL (ref 80–100)
MONOCYTES # BLD AUTO: 0.4 K/UL (ref 0–1)
MONOCYTES NFR BLD AUTO: 5 %
NEUTROPHILS # BLD AUTO: 5 K/UL (ref 1.3–7.7)
NEUTROPHILS NFR BLD AUTO: 69 %
PLATELET # BLD AUTO: 309 K/UL (ref 150–450)
POTASSIUM SERPL-SCNC: 4.4 MMOL/L (ref 3.5–5.1)
PROT SERPL-MCNC: 7 G/DL (ref 6.3–8.2)
PT BLD: 10.3 SEC (ref 9–12)
SODIUM SERPL-SCNC: 140 MMOL/L (ref 137–145)
URATE SERPL-MCNC: 3 MG/DL (ref 3.5–8.5)
WBC # BLD AUTO: 7.2 K/UL (ref 3.8–10.6)

## 2022-12-28 PROCEDURE — 96375 TX/PRO/DX INJ NEW DRUG ADDON: CPT

## 2022-12-28 PROCEDURE — 85730 THROMBOPLASTIN TIME PARTIAL: CPT

## 2022-12-28 PROCEDURE — 99285 EMERGENCY DEPT VISIT HI MDM: CPT

## 2022-12-28 PROCEDURE — 96367 TX/PROPH/DG ADDL SEQ IV INF: CPT

## 2022-12-28 PROCEDURE — 85652 RBC SED RATE AUTOMATED: CPT

## 2022-12-28 PROCEDURE — 87040 BLOOD CULTURE FOR BACTERIA: CPT

## 2022-12-28 PROCEDURE — 36415 COLL VENOUS BLD VENIPUNCTURE: CPT

## 2022-12-28 PROCEDURE — 96361 HYDRATE IV INFUSION ADD-ON: CPT

## 2022-12-28 PROCEDURE — 84550 ASSAY OF BLOOD/URIC ACID: CPT

## 2022-12-28 PROCEDURE — 85610 PROTHROMBIN TIME: CPT

## 2022-12-28 PROCEDURE — 96365 THER/PROPH/DIAG IV INF INIT: CPT

## 2022-12-28 PROCEDURE — 80053 COMPREHEN METABOLIC PANEL: CPT

## 2022-12-28 PROCEDURE — 86140 C-REACTIVE PROTEIN: CPT

## 2022-12-28 PROCEDURE — 96366 THER/PROPH/DIAG IV INF ADDON: CPT

## 2022-12-28 PROCEDURE — 85025 COMPLETE CBC W/AUTO DIFF WBC: CPT

## 2022-12-28 RX ADMIN — DOCUSATE SODIUM SCH MG: 100 CAPSULE, LIQUID FILLED ORAL at 19:50

## 2022-12-28 RX ADMIN — METOPROLOL TARTRATE SCH MG: 25 TABLET, FILM COATED ORAL at 19:50

## 2022-12-28 RX ADMIN — LACTOBACILLUS ACIDOPHILUS / LACTOBACILLUS BULGARICUS SCH EACH: 100 MILLION CFU STRENGTH GRANULES at 19:50

## 2022-12-28 RX ADMIN — CEFAZOLIN SCH MLS/HR: 330 INJECTION, POWDER, FOR SOLUTION INTRAMUSCULAR; INTRAVENOUS at 11:43

## 2022-12-28 RX ADMIN — CEFAZOLIN SCH MLS/HR: 330 INJECTION, POWDER, FOR SOLUTION INTRAMUSCULAR; INTRAVENOUS at 23:13

## 2022-12-28 NOTE — P.CNOR
History of Present Illness





- HPI


Consult date: 12/28/22


Requesting physician: Fernando Walton


Consult reason: other (right wrist synovitis, concern for gout/pseudogout, may 

need tap)


History of present illness: 








Patient is a 77-year-old male with a past medical history of MSSA bacteremia, 

CML, CAD who presents to the emergency department earlier today with right wrist

swelling and pain.  Patient did undergo a cardiac catheterization by Dr. Bonilla 

on 12/07/2022.  Patient presented back to the hospital on 12/11/2022 and was 

diagnosed with pneumonia.  Patient has been on IV cefazolin over the past couple

weeks since being discharged from the hospital.  Patient was seen at bedside 

this morning lying in the semirecumbent position and says yesterday he had 

increased swelling and pain in the right wrist.  Patient says he has had fevers 

at home.  Patient says he does have movement in his right wrist, although he 

feels that it is limited.  He does also note he has some increased swelling over

the right wrist the past few days.  Patient denies any trauma/injury to the 

right wrist.  Patient denies chest pain, increasing shortness breath, nausea, 

vomiting, change in vision, loss of bowel/bladder control.








Past Medical History


Past Medical History: Coronary Artery Disease (CAD), Cancer, GERD/Reflux, 

Hypertension, Skin Disorder


Additional Past Medical History / Comment(s): Hx. of CML, Hx. of Shingles L eye,

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


History of Any Multi-Drug Resistant Organisms: None Reported


Past Surgical History: Cholecystectomy, Heart Catheterization With Stent, Hernia

Repair, Orthopedic Surgery


Additional Past Surgical History / Comment(s): Thyroid surgery,Vasectomy, R 

ankle surgery, Sinus surgery, Basal cell R shoulder, Pyloroplasty & Vagotomy, 

Hemorrhoidectomy, Cataract surgery & bx. of inner thigh.


Past Anesthesia/Blood Transfusion Reactions: No Reported Reaction


Date of Last Stent Placement:: 2006


Past Psychological History: No Psychological Hx Reported


Smoking Status: Former smoker


Past Alcohol Use History: None Reported


Past Drug Use History: None Reported





- Past Family History


  ** Mother


Family Medical History: Cancer, Pulmonary Embolus





  ** Father


Family Medical History: Cancer





  ** Brother(s)


Family Medical History: Cancer





  ** Son(s)


Family Medical History: Cancer





Medications and Allergies


                                Home Medications











 Medication  Instructions  Recorded  Confirmed  Type


 


Ascorbic Acid [Vitamin C] 1,000 mg PO DAILY 08/21/19 12/28/22 History


 


Aspirin 81 mg PO DAILY 08/21/19 12/28/22 History


 


Losartan [Cozaar] 50 mg PO DAILY 08/21/19 12/28/22 History


 


Metoprolol Tartrate [Lopressor] 25 mg PO BID 08/21/19 12/28/22 History


 


Nitroglycerin Sl Tabs [Nitrostat] 0.4 mg SL Q5M PRN 08/21/19 12/28/22 History


 


Omeprazole [PriLOSEC] 40 mg PO HS 08/21/19 12/28/22 History


 


Ubidecarenone [Co Q-10] 100 mg PO DAILY 08/21/19 12/28/22 History


 


Docusate [Colace] 100 mg PO BID 09/02/21 12/28/22 History


 


Albuterol Inhaler [Ventolin Hfa 1 - 2 puff INHALATION RT-QID PRN 12/07/22 12/28/22 History





Inhaler]    


 


Fluticasone/Vilanterol [Breo 1 puff INHALATION RT-QID PRN 12/07/22 12/28/22 

History





Ellipta 200-25 Mcg Inhaler]    


 


Cranberry Fruit [Cranberry] 465 mg PO DAILY 12/12/22 12/28/22 History


 


Furosemide [Lasix] 40 mg PO DAILY PRN 12/12/22 12/28/22 History


 


Quercetin 800 mg PO BID 12/12/22 12/28/22 History


 


Zinc Gluconate [Zinc] 50 mg PO DAILY 12/12/22 12/28/22 History


 


Pantoprazole [Protonix] 40 mg PO DAILY #30 tab 12/20/22 12/28/22 Rx


 


Centrum Cardio 2 tab PO DAILY 12/28/22 12/28/22 History


 


Cholecalciferol [Vitamin D3 (25 50 mcg PO DAILY 12/28/22 12/28/22 History





Mcg = 1000 Iu)]    


 


Prosynbiotic  1 tab PO BID 12/28/22 12/28/22 History


 


ceFAZolin [Kefzol] 2 gm IVP Q8H 12/28/22 12/28/22 History








                                    Allergies











Allergy/AdvReac Type Severity Reaction Status Date / Time


 


adhesive tape Allergy  Rash/Hives,"paper Verified 12/28/22 11:45





   tape is ok"  


 


amoxicillin [From Augmentin] Allergy  Rash/Hives Verified 12/28/22 11:45


 


atorvastatin [From Lipitor] Allergy  Muscle Verified 12/28/22 11:45





   Aches  


 


budesonide [From Symbicort] Allergy  Vision Verified 12/28/22 11:45





   issues  


 


ciprofloxacin Allergy  Muscle Verified 12/28/22 11:45





   aches  


 


clavulanic acid Allergy  Rash/Hives Verified 12/28/22 11:45





[From Augmentin]     


 


doxycycline Allergy  Rectal Verified 12/28/22 11:45





   Bleeding  


 


enalapril Allergy  Cough Verified 12/28/22 11:45


 


ezetimibe [From Vytorin] Allergy  Muscle Verified 12/28/22 11:45





   Aches  


 


fluticasone Allergy  Irregular Verified 12/28/22 11:45





[From Advair Diskus]   heart rate  


 


formoterol [From Symbicort] Allergy  Vision Verified 12/28/22 11:45





   issues  


 


mold Allergy  Rash/Hives Verified 12/28/22 11:45


 


salmeterol Allergy  Irregular Verified 12/28/22 11:45





[From Advair Diskus]   heart rate  


 


simvastatin [From Vytorin] Allergy  Muscle Verified 12/28/22 11:45





   Aches  


 


Statins-HMG-CoA Reductase Allergy  Muscle Verified 12/28/22 11:45





Inhibitor   aches  





[Statins-Hmg-Coa Reductase     





Inhibitor]     


 


nilotinib [From Tasigna] AdvReac  Pancreatiti Verified 12/28/22 11:45





   s  


 


Cotton material Allergy  itching, Uncoded 12/28/22 11:45





   hives  














Physical Examination


Osteopathic Statement: *.  No significant issues noted on an osteopathic 

structural exam other than those noted in the History and Physical/Consult.


Inspection: Swelling present over the right wrist at the dorsum.  There is some 

very minimal erythema along the right wrist.  Negative for any ecchymosis/ulce

rs.  There is a midline present on the left upper extremity.  Negative for any 

open fractures.





Sensation: Sensation is equal, symmetric, bilaterally intact throughout the 

upper and lower extremities.





Palpation: There is some mild tenderness to palpation over the dorsum in the 

right wrist.  Nontender to palpation throughout rest of exam





Range of motion: There is limited range of motion in flexion/extension of the 

right wrist, however patient is able to flex and extend the right wrist.  Full 

range of motion in left wrist and flexion/extension.





Motor: 4/5 in resisted flexion/extension of the right wrist.  5/5 in resisted 

right elbow flexion/extension.  5/5 in left wrist flexion/extension and left 

elbow flexion and extension





Neurovascular status: Radial pulses intact, 2+ bilaterally.  Cap refill under 3 

seconds in digits upper extremities.











Results





- Labs


Labs: 


                  Abnormal Lab Results - Last 24 Hours (Table)











  12/28/22 12/28/22 12/28/22 Range/Units





  07:37 07:37 07:37 


 


RBC  3.11 L    (4.30-5.90)  m/uL


 


Hgb  10.1 L    (13.0-17.5)  gm/dL


 


Hct  30.1 L    (39.0-53.0)  %


 


ESR     (0-15)  mm/hr


 


Chloride   109 H   ()  mmol/L


 


BUN   21 H   (9-20)  mg/dL


 


Glucose   117 H   (74-99)  mg/dL


 


Uric Acid    3.0 L  (3.5-8.5)  mg/dL














  12/28/22 Range/Units





  07:37 


 


RBC   (4.30-5.90)  m/uL


 


Hgb   (13.0-17.5)  gm/dL


 


Hct   (39.0-53.0)  %


 


ESR  28 H  (0-15)  mm/hr


 


Chloride   ()  mmol/L


 


BUN   (9-20)  mg/dL


 


Glucose   (74-99)  mg/dL


 


Uric Acid   (3.5-8.5)  mg/dL








                                      H & H











  12/28/22 Range/Units





  07:37 


 


Hgb  10.1 L  (13.0-17.5)  gm/dL


 


Hct  30.1 L  (39.0-53.0)  %








                                   Coagulation











  12/28/22 Range/Units





  07:37 


 


INR  1.0  (<1.2)  











Result Diagrams: 


                                 12/28/22 07:37





                                 12/28/22 07:37





- Diagnostic results


Wrist/Hand x-ray: report reviewed, image reviewed (x-ray of the right wrist and 

right hand have been reviewed.  There is CMCJ arthritis in the 1st digit right 

hand.  Negative for any fractures/dislocations)





Assessment and Plan


Assessment: 


1.  Osteoarthritis first Carpometacarpal joint, right hand; cellulitis right 

wrist











Plan: 


1. Osteoarthritis first Carpometacarpal joint, right hand; cellulitis right 

wrist - x-ray of the right wrist and right hand have been reviewed.  There is 

CMCJ arthritis in the 1st digit right hand.  Negative for any fractures.  

Patient does present with some swelling and minimal erythema on exam, and does 

have good, but limited range of motion in the right wrist.  At this time there 

is low suspicion for septic arthritis in the right hand, likely early stages of 

cellulitis.  At this time we do not recommend any emergent/urgent orthopedic 

surgical intervention.  We have ordered ESR and CRP for further evaluation.  

We'll continue to follow patient during his stay in hospital.  We do recommend 

patient to continue to be on antibiotics.  Pain medication as needed.





2.  Appreciate medical management





3.  Pain management - tylenol prn





4.  DVT prophylaxis - Lovenox; aspirin





5.  GI prophylaxis - Protonix; Colace





6.  PT/OT - weightbearing as tolerated





7.  Appreciate consult





**Patient seen and examined at beside and I agree with above. Low concern for 

septic arthritis at this time, able to passively and actively move the wrist 

with minimal pain. He does have signs of early cellulitis which appear to 

originate around the site of the heart cath site on the volar wrist. Recommend 

ice, IV antibiotics. 





-Bright Moss DO


Orthopedic Surgeon **





Time with Patient: Less than 30

## 2022-12-28 NOTE — P.HPIM
History of Present Illness


H&P Date: 12/28/22


Chief Complaint: Right wrist swelling





Patient is a 77-year-old male with history of recent MSSA bacteremia discharged 

on IV antibiotics, CML, coronary artery disease, aortic valve stenosis 

presenting with right wrist swelling and pain.  His recent cardiac ca

theterization prior to bacteremia also had a radial axis through the right side.

 He claims that he has been doing okay on IV cefazolin.  However, yesterday he 

woke up at swelling, redness, pain in his right wrist.  The pain was radiating 

up the forearm.  Denies any new mechanical injury.  He has had some fevers at 

home.  He denies any chest pain, shortness of breath, abdominal pain, nausea, 

vomiting, diarrhea, constipation, or urinary complaints.





In the ED, he was afebrile.  His white count was 7.2, hemoglobin 10.1, platelets

309.  His electrolytes were unremarkable.  Hand and wrist x-ray showed 

degenerative joint changes, but no acute process.  He was given a dose of 

vancomycin in the ED, and admitted for further workup.  Right upper extremity 

venous Doppler did not show any signs of DVT.





Patient seen and examined at bedside. 





Pertinent positives and negatives as discussed in HPI, a complete review of 

systems was performed and all other systems are negative.








Vital signs reviewed


General: nontoxic, no distress, appears at stated age


Derm:  warm, dry


Head: atraumatic, normocephalic, symmetric


Eyes: EOMI, no lid lag, anicteric sclera, pupils equal round reactive to light


ENT: Nose and ears atraumatic


Neck: No thyromegaly, supple


Mouth: no lip lesion, mucus membranes moist


Cardiovascular: S1S2 reg, no murmur, trace peripheral edema


Lungs: clear to auscultation bilateral, no rhonchi, no rales, no wheeze, no 

accessory muscle use


Abdominal: soft,  nontender to palpation, no guarding, no appreciable 

organomegaly


Ext: Right wrist synovitis, no overlying erythema, limited range of motion due 

to edema


Neuro:  CN II-XII grossly intact


Psych: Alert, oriented, appropriate affect





Assessment/Plan: 


Right wrist swelling


-Differential includes cellulitis, gout flare, pseudogout, septic arthritis


-ID consulted


-Likely cellulitis given patient was on cefazolin at home


-Given history of CML, possibly gout


-We'll treat with oral steroids


-Orthopedic surgery consulted








History of recent MSSA bacteremia


History of CML


-Continue cefazolin


-Currently holding CML therapy per oncology





Coronary artery disease


Aortic valve stenosis


Hypertension


-Continue home medications





The patient is admitted with an anticipated greater than 2 midnight stay for 

evaluation of right wrist swelling.


Surrogate decision-maker: Wife


CODE STATUS: Full code


DVT prophylaxis: Subcu heparin


Anticipated discharge date: Pending clinical course


Anticipated discharge place: pending clinical course


A total of 65 minutes was spent on the care of this complex patient more than 

50% of the time was spent in counseling and care coordination.





Past Medical History


Past Medical History: Coronary Artery Disease (CAD), Cancer, GERD/Reflux, 

Hypertension, Skin Disorder


Additional Past Medical History / Comment(s): Hx. of CML, Hx. of Shingles L eye,

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


History of Any Multi-Drug Resistant Organisms: None Reported


Past Surgical History: Cholecystectomy, Heart Catheterization With Stent, Hernia

Repair, Orthopedic Surgery


Additional Past Surgical History / Comment(s): Thyroid surgery,Vasectomy, R 

ankle surgery, Sinus surgery, Basal cell R shoulder, Pyloroplasty & Vagotomy, 

Hemorrhoidectomy, Cataract surgery & bx. of inner thigh.


Past Anesthesia/Blood Transfusion Reactions: No Reported Reaction


Date of Last Stent Placement:: 2006


Past Psychological History: No Psychological Hx Reported


Smoking Status: Former smoker


Past Alcohol Use History: None Reported


Past Drug Use History: None Reported





- Past Family History


  ** Mother


Family Medical History: Cancer, Pulmonary Embolus





  ** Father


Family Medical History: Cancer





  ** Brother(s)


Family Medical History: Cancer





  ** Son(s)


Family Medical History: Cancer





Medications and Allergies


                                Home Medications











 Medication  Instructions  Recorded  Confirmed  Type


 


Ascorbic Acid [Vitamin C] 1,000 mg PO DAILY 08/21/19 12/28/22 History


 


Aspirin 81 mg PO DAILY 08/21/19 12/28/22 History


 


Losartan [Cozaar] 50 mg PO DAILY 08/21/19 12/28/22 History


 


Metoprolol Tartrate [Lopressor] 25 mg PO BID 08/21/19 12/28/22 History


 


Nitroglycerin Sl Tabs [Nitrostat] 0.4 mg SL Q5M PRN 08/21/19 12/28/22 History


 


Omeprazole [PriLOSEC] 40 mg PO HS 08/21/19 12/28/22 History


 


Ubidecarenone [Co Q-10] 100 mg PO DAILY 08/21/19 12/28/22 History


 


Docusate [Colace] 100 mg PO BID 09/02/21 12/28/22 History


 


Albuterol Inhaler [Ventolin Hfa 1 - 2 puff INHALATION RT-QID PRN 12/07/22 12/28/22 History





Inhaler]    


 


Fluticasone/Vilanterol [Breo 1 puff INHALATION RT-QID PRN 12/07/22 12/28/22 

History





Ellipta 200-25 Mcg Inhaler]    


 


Cranberry Fruit [Cranberry] 465 mg PO DAILY 12/12/22 12/28/22 History


 


Furosemide [Lasix] 40 mg PO DAILY PRN 12/12/22 12/28/22 History


 


Quercetin 800 mg PO BID 12/12/22 12/28/22 History


 


Zinc Gluconate [Zinc] 50 mg PO DAILY 12/12/22 12/28/22 History


 


Pantoprazole [Protonix] 40 mg PO DAILY #30 tab 12/20/22 12/28/22 Rx


 


Centrum Cardio 2 tab PO DAILY 12/28/22 12/28/22 History


 


Cholecalciferol [Vitamin D3 (25 50 mcg PO DAILY 12/28/22 12/28/22 History





Mcg = 1000 Iu)]    


 


Prosynbiotic  1 tab PO BID 12/28/22 12/28/22 History


 


ceFAZolin [Kefzol] 2 gm IVP Q8H 12/28/22 12/28/22 History








                                    Allergies











Allergy/AdvReac Type Severity Reaction Status Date / Time


 


adhesive tape Allergy  Rash/Hives,"paper Verified 12/28/22 11:45





   tape is ok"  


 


amoxicillin [From Augmentin] Allergy  Rash/Hives Verified 12/28/22 11:45


 


atorvastatin [From Lipitor] Allergy  Muscle Verified 12/28/22 11:45





   Aches  


 


budesonide [From Symbicort] Allergy  Vision Verified 12/28/22 11:45





   issues  


 


ciprofloxacin Allergy  Muscle Verified 12/28/22 11:45





   aches  


 


clavulanic acid Allergy  Rash/Hives Verified 12/28/22 11:45





[From Augmentin]     


 


doxycycline Allergy  Rectal Verified 12/28/22 11:45





   Bleeding  


 


enalapril Allergy  Cough Verified 12/28/22 11:45


 


ezetimibe [From Vytorin] Allergy  Muscle Verified 12/28/22 11:45





   Aches  


 


fluticasone Allergy  Irregular Verified 12/28/22 11:45





[From Advair Diskus]   heart rate  


 


formoterol [From Symbicort] Allergy  Vision Verified 12/28/22 11:45





   issues  


 


mold Allergy  Rash/Hives Verified 12/28/22 11:45


 


salmeterol Allergy  Irregular Verified 12/28/22 11:45





[From Advair Diskus]   heart rate  


 


simvastatin [From Vytorin] Allergy  Muscle Verified 12/28/22 11:45





   Aches  


 


Statins-HMG-CoA Reductase Allergy  Muscle Verified 12/28/22 11:45





Inhibitor   aches  





[Statins-Hmg-Coa Reductase     





Inhibitor]     


 


nilotinib [From Tasigna] AdvReac  Pancreatiti Verified 12/28/22 11:45





   s  


 


Cotton material Allergy  itching, Uncoded 12/28/22 11:45





   hives  














Physical Exam


Vitals: 


                                   Vital Signs











  Temp Pulse Resp BP Pulse Ox


 


 12/28/22 07:35  98.1 F  73  16  169/81  97


 


 12/28/22 06:33  97.7 F  77  18  161/86  96








                                Intake and Output











 12/28/22 12/28/22 12/28/22





 06:59 14:59 22:59


 


Other:   


 


  Weight 97.522 kg  














Results


CBC & Chem 7: 


                                 12/28/22 07:37





                                 12/28/22 07:37


Labs: 


                  Abnormal Lab Results - Last 24 Hours (Table)











  12/28/22 12/28/22 Range/Units





  07:37 07:37 


 


RBC  3.11 L   (4.30-5.90)  m/uL


 


Hgb  10.1 L   (13.0-17.5)  gm/dL


 


Hct  30.1 L   (39.0-53.0)  %


 


Chloride   109 H  ()  mmol/L


 


BUN   21 H  (9-20)  mg/dL


 


Glucose   117 H  (74-99)  mg/dL

## 2022-12-28 NOTE — US
EXAMINATION TYPE: US venous doppler duplex UE RT

 

DATE OF EXAM: 12/28/2022

 

COMPARISON: NONE

 

CLINICAL HISTORY: Right lower arm swelling and pain

 

SIDE PERFORMED: Right

 

Right Arm: Negative for DVT, Grayscale, color doppler, spectral doppler imaging performed of the deep
 veins of the upper extremities.  There is normal flow, compressibility and vascular waveforms.

 

IMPRESSION: No evidence of deep vein thrombosis in the right upper extremity.

## 2022-12-28 NOTE — ED
General Adult HPI





- General


Chief complaint: Extremity Problem,Nontraumatic


Stated complaint: RT HAND SWELLING,PAIN


Time Seen by Provider: 12/28/22 10:12


Source: patient


Mode of arrival: ambulatory





- History of Present Illness


Initial comments: 


Patient is a 77-year-old male who presents to the emergency department for 

evaluation of right wrist swelling and pain.  Patient underwent a cardiac 

catheterization with Dr. Bonilla on 12/7.  Patient was discharged home in stable 

condition however on 12/11 patient presented to the emergency department with 

weakness, fever, fatigue, and abdominal discomfort. He was diagnosed with 

pneumonia and admitted due to immunocompromise state with CML and daily 

chemotherapy. Blood cultures were positive for MSSA.Patient was evaluated by 

infectious disease and started on Cefazolin.  They did recommend MORGAN which 

patient refused.  Patient was discharged with a midline anterior weeks of IV 

Cefazolin TID which he has been taking as directed.  Patient woke up yesterday 

with swelling, redness, and pain in his right wrist. There is radiation up the 

forearm.  He denies injury.  He reports fevers at home, max Temp 100.6F.  

Patient has been taking Tylenol.  He denies nausea, vomiting, weakness.








- Related Data


                                Home Medications











 Medication  Instructions  Recorded  Confirmed


 


Ascorbic Acid [Vitamin C] 1,000 mg PO DAILY 08/21/19 12/28/22


 


Aspirin 81 mg PO DAILY 08/21/19 12/28/22


 


Losartan [Cozaar] 50 mg PO DAILY 08/21/19 12/28/22


 


Metoprolol Tartrate [Lopressor] 25 mg PO BID 08/21/19 12/28/22


 


Nitroglycerin Sl Tabs [Nitrostat] 0.4 mg SL Q5M PRN 08/21/19 12/28/22


 


Omeprazole [PriLOSEC] 40 mg PO HS 08/21/19 12/28/22


 


Ubidecarenone [Co Q-10] 100 mg PO DAILY 08/21/19 12/28/22


 


Docusate [Colace] 100 mg PO BID 09/02/21 12/28/22


 


Albuterol Inhaler [Ventolin Hfa 1 - 2 puff INHALATION RT-QID PRN 12/07/22 1

2/28/22





Inhaler]   


 


Fluticasone/Vilanterol [Breo 1 puff INHALATION RT-QID PRN 12/07/22 12/28/22





Ellipta 200-25 Mcg Inhaler]   


 


Cranberry Fruit [Cranberry] 465 mg PO DAILY 12/12/22 12/28/22


 


Furosemide [Lasix] 40 mg PO DAILY PRN 12/12/22 12/28/22


 


Quercetin 800 mg PO BID 12/12/22 12/28/22


 


Zinc Gluconate [Zinc] 50 mg PO DAILY 12/12/22 12/28/22


 


Centrum Cardio 2 tab PO DAILY 12/28/22 12/28/22


 


Cholecalciferol [Vitamin D3 (25 50 mcg PO DAILY 12/28/22 12/28/22





Mcg = 1000 Iu)]   


 


Prosynbiotic  1 tab PO BID 12/28/22 12/28/22


 


ceFAZolin [Kefzol] 2 gm IVP Q8H 12/28/22 12/28/22








                                  Previous Rx's











 Medication  Instructions  Recorded


 


Pantoprazole [Protonix] 40 mg PO DAILY #30 tab 12/20/22











                                    Allergies











Allergy/AdvReac Type Severity Reaction Status Date / Time


 


adhesive tape Allergy  Rash/Hives,"paper Verified 12/28/22 11:45





   tape is ok"  


 


amoxicillin [From Augmentin] Allergy  Rash/Hives Verified 12/28/22 11:45


 


atorvastatin [From Lipitor] Allergy  Muscle Verified 12/28/22 11:45





   Aches  


 


budesonide [From Symbicort] Allergy  Vision Verified 12/28/22 11:45





   issues  


 


ciprofloxacin Allergy  Muscle Verified 12/28/22 11:45





   aches  


 


clavulanic acid Allergy  Rash/Hives Verified 12/28/22 11:45





[From Augmentin]     


 


doxycycline Allergy  Rectal Verified 12/28/22 11:45





   Bleeding  


 


enalapril Allergy  Cough Verified 12/28/22 11:45


 


ezetimibe [From Vytorin] Allergy  Muscle Verified 12/28/22 11:45





   Aches  


 


fluticasone Allergy  Irregular Verified 12/28/22 11:45





[From Advair Diskus]   heart rate  


 


formoterol [From Symbicort] Allergy  Vision Verified 12/28/22 11:45





   issues  


 


mold Allergy  Rash/Hives Verified 12/28/22 11:45


 


salmeterol Allergy  Irregular Verified 12/28/22 11:45





[From Advair Diskus]   heart rate  


 


simvastatin [From Vytorin] Allergy  Muscle Verified 12/28/22 11:45





   Aches  


 


Statins-HMG-CoA Reductase Allergy  Muscle Verified 12/28/22 11:45





Inhibitor   aches  





[Statins-Hmg-Coa Reductase     





Inhibitor]     


 


nilotinib [From Tasigna] AdvReac  Pancreatiti Verified 12/28/22 11:45





   s  


 


Cotton material Allergy  itching, Uncoded 12/28/22 11:45





   hives  














Review of Systems


ROS Statement: 


Those systems with pertinent positive or pertinent negative responses have been 

documented in the HPI.





ROS Other: All systems not noted in ROS Statement are negative.





Past Medical History


Past Medical History: Coronary Artery Disease (CAD), Cancer, GERD/Reflux, 

Hypertension, Skin Disorder


Additional Past Medical History / Comment(s): Hx. of CML, Hx. of Shingles L eye,

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


History of Any Multi-Drug Resistant Organisms: None Reported


Past Surgical History: Cholecystectomy, Heart Catheterization With Stent, Hernia

Repair, Orthopedic Surgery


Additional Past Surgical History / Comment(s): Thyroid surgery,Vasectomy, R 

ankle surgery, Sinus surgery, Basal cell R shoulder, Pyloroplasty & Vagotomy, 

Hemorrhoidectomy, Cataract surgery & bx. of inner thigh.


Past Anesthesia/Blood Transfusion Reactions: No Reported Reaction


Date of Last Stent Placement:: 2006


Past Psychological History: No Psychological Hx Reported


Smoking Status: Former smoker


Past Alcohol Use History: None Reported


Past Drug Use History: None Reported





- Past Family History


  ** Mother


Family Medical History: Cancer, Pulmonary Embolus





  ** Father


Family Medical History: Cancer





  ** Brother(s)


Family Medical History: Cancer





  ** Son(s)


Family Medical History: Cancer





General Exam


General appearance: alert, in no apparent distress


Head exam: Present: atraumatic, normocephalic, normal inspection


Eye exam: Present: normal appearance, PERRL, EOMI.  Absent: scleral icterus, 

conjunctival injection, periorbital swelling


Respiratory exam: Present: normal lung sounds bilaterally.  Absent: respiratory 

distress, wheezes, rales, rhonchi, stridor


Cardiovascular Exam: Present: regular rate, normal rhythm, normal heart sounds. 

Absent: systolic murmur, diastolic murmur, rubs, gallop, clicks


  ** Right


Shoulder Exam: Present: normal inspection, full ROM.  Absent: tenderness, 

swelling


Upper Arm exam: Present: full ROM, ecchymosis (pt states this is from blood 

draws at prior hospitalization ).  Absent: normal inspection, tenderness, 

swelling


Elbow exam: Present: normal inspection, full ROM.  Absent: tenderness, swelling


Forearm Wrist exam: Present: other (tenderness, swelling of wrist. Warm and 

erythema on dorsal wrist. No wounds or abrasions noted)





Course


                                   Vital Signs











  12/28/22 12/28/22





  06:33 07:35


 


Temperature 97.7 F 98.1 F


 


Pulse Rate 77 73


 


Respiratory 18 16





Rate  


 


Blood Pressure 161/86 169/81


 


O2 Sat by Pulse 96 97





Oximetry  














Medical Decision Making





- Medical Decision Making


Was pt. sent in by a medical professional or institution?


No


Did you speak to anyone other than the patient for history?  


Yes, wife. 


Did you review nursing and triage notes? 


Yes, symptoms consistent with nursing and triage notes.


Were old charts reviewed? 


 Yes, patient recently admitted for pneumonia and sepsis.  Course discussed in 

HPI


Differential Diagnosis? 


Musculoskeletal injury, cellulitis, DVT


EKG interpreted by me (3pts min.)?


NA


X-rays interpreted by me (1pt min.)?


 Yes, right wrist and hand x-ray obtained interpreted by me which shows no acute

osseous abnormality the right wrist.  There are degenerative joint changes at 

the first carpometacarpal junction.  Patient does not have anatomical snuffbox 

tenderness


CT interpreted by me (1pt min.)?


NA


U/S interpreted by me (1pt. min.)?


Yes, right upper extremity venous Doppler shows no evidence of deep venous 

thrombosis or other acute process


What testing was considered but not performed? (CT, X-rays, U/S, labs)? Why?


None


What meds were considered but not given? Why?


None


Did you discuss the management of the patient with other professionals?


Yes, I discussed the case with my attending Dr. Heredia.


Did you reconcile home meds?


No, they were not verified by pharmacy.


Was smoking cessation discussed for >3mins.?


NA


Was critical care preformed (if so, how long)?


No


Were there social determinants of health that impacted care today? How? 

(Homelessness, low income, unemployed, alcoholism, drug addiction, 

transportation, low edu. Level, literacy, decrease access to med. care, [facility], 

rehab)?


No


Was there de-escalation of care discussed even if they declined? (Discuss DNR or

withdrawal of care, Hospice)?


No


What co-morbidities impacted this encounter? (DM, HTN, Smoking, COPD, CAD, 

Cancer, CVA, Hep., AIDS, mental health diagnosis, sleep apnea, morbid obesity)?


CML


Was patient admitted / discharged?


This is a 


Undiagnosed new problem with uncertain prognosis?


@  -[none]


Drug Therapy requiring intensive monitoring for toxicity (Heparin, Nitro, 

Insulin, Cardizem)?


No


Were any procedures done?


No


Diagnosis/symptom?


@  -[default]


Acute, or Chronic, or Acute on Chronic?


@  -[default]


Uncomplicated (without systemic symptoms) or Complicated (systemic symptoms)?


@  -[default]


Side effects of treatment?


@  -[none]


Exacerbation, Progression, or Severe Exacerbation]


NA


Poses a threat to life or bodily function?


No








- Lab Data


Result diagrams: 


                                 12/28/22 07:37





                                 12/28/22 07:37


                                   Lab Results











  12/28/22 12/28/22 12/28/22 Range/Units





  07:37 07:37 07:37 


 


WBC  7.2    (3.8-10.6)  k/uL


 


RBC  3.11 L    (4.30-5.90)  m/uL


 


Hgb  10.1 L    (13.0-17.5)  gm/dL


 


Hct  30.1 L    (39.0-53.0)  %


 


MCV  96.9    (80.0-100.0)  fL


 


MCH  32.6    (25.0-35.0)  pg


 


MCHC  33.7    (31.0-37.0)  g/dL


 


RDW  14.8    (11.5-15.5)  %


 


Plt Count  309    (150-450)  k/uL


 


MPV  7.6    


 


Neutrophils %  69    %


 


Lymphocytes %  24    %


 


Monocytes %  5    %


 


Eosinophils %  1    %


 


Basophils %  1    %


 


Neutrophils #  5.0    (1.3-7.7)  k/uL


 


Lymphocytes #  1.7    (1.0-4.8)  k/uL


 


Monocytes #  0.4    (0-1.0)  k/uL


 


Eosinophils #  0.1    (0-0.7)  k/uL


 


Basophils #  0.0    (0-0.2)  k/uL


 


Poikilocytosis  Slight    


 


PT   10.3   (9.0-12.0)  sec


 


INR   1.0   (<1.2)  


 


APTT   24.9   (22.0-30.0)  sec


 


Sodium    140  (137-145)  mmol/L


 


Potassium    4.4  (3.5-5.1)  mmol/L


 


Chloride    109 H  ()  mmol/L


 


Carbon Dioxide    24  (22-30)  mmol/L


 


Anion Gap    7  mmol/L


 


BUN    21 H  (9-20)  mg/dL


 


Creatinine    0.72  (0.66-1.25)  mg/dL


 


Est GFR (CKD-EPI)AfAm    >90  (>60 ml/min/1.73 sqM)  


 


Est GFR (CKD-EPI)NonAf    90  (>60 ml/min/1.73 sqM)  


 


Glucose    117 H  (74-99)  mg/dL


 


Calcium    9.0  (8.4-10.2)  mg/dL


 


Total Bilirubin    0.4  (0.2-1.3)  mg/dL


 


AST    23  (17-59)  U/L


 


ALT    9  (4-49)  U/L


 


Alkaline Phosphatase    72  ()  U/L


 


Total Protein    7.0  (6.3-8.2)  g/dL


 


Albumin    3.9  (3.5-5.0)  g/dL














Disposition


Clinical Impression: 


 Right wrist pain, Swelling of right wrist





Disposition: ADMITTED AS IP TO THIS [facility]


Condition: Good


Referrals: 


None,Stated [REFERRING] - 1-2 days

## 2022-12-28 NOTE — P.CONS
History of Present Illness





- Reason for Consult


Consult date: 12/28/22


Possible right wrist cellulitis


Requesting physician: Nisreen Carlin





- Chief Complaint


Right wrist pain and swelling x one day





- History of Present Illness


Patient is a 77-year-old  male who was recently admitted at this 

facility and did have MSSA bacteremia patient did have extensive work-up 

including a WBC scan that was negative patient did clear his bacteremia he did 

get a midline and is currently getting cefazolin 2 g every 8 hours at home 

patient now presenting back to the hospital concerning for right wrist and hand 

area swelling redness and pain that apparently started last night patient 

described the pain to be more of a sharp in nature almost 7-8 out of 10 and 

worse with movement of the wrist joint patient denies having significant 

radiation of the pain currently do not have an open wound or any drainage 

patient did have a low-grade fever 100.8 F at home with the symptom the patient

was brought into the ER on arrival to the ER the patient has been afebrile and 

no fever has been recorded subsequently patient did have a normal white count c

reatinine was normal liver enzymes are normal patient did have a venous Doppler 

no evidence of DVT in the right upper extremity x-ray of the wrist and hand area

no acute abnormality degenerative joint changes patient has been continued on 

cefazolin he did receive a dose of vancomycin in the ER infectious disease was 

consulted for further management of antibiotic therapy








Review of Systems


Positive point has been  mentioned in the HPI rest of the systems are negative








Past Medical History


Past Medical History: Coronary Artery Disease (CAD), Cancer, GERD/Reflux, 

Hypertension, Skin Disorder


Additional Past Medical History / Comment(s): Hx. of CML, Hx. of Shingles L eye,

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


History of Any Multi-Drug Resistant Organisms: None Reported


Past Surgical History: Cholecystectomy, Heart Catheterization With Stent, Hernia

Repair, Orthopedic Surgery


Additional Past Surgical History / Comment(s): Thyroid surgery,Vasectomy, R 

ankle surgery, Sinus surgery, Basal cell R shoulder, Pyloroplasty & Vagotomy, 

Hemorrhoidectomy, Cataract surgery & bx. of inner thigh.


Past Anesthesia/Blood Transfusion Reactions: No Reported Reaction


Date of Last Stent Placement:: 2006


Past Psychological History: No Psychological Hx Reported


Smoking Status: Former smoker


Past Alcohol Use History: None Reported


Past Drug Use History: None Reported





- Past Family History


  ** Mother


Family Medical History: Cancer, Pulmonary Embolus





  ** Father


Family Medical History: Cancer





  ** Brother(s)


Family Medical History: Cancer





  ** Son(s)


Family Medical History: Cancer





Medications and Allergies


                                Home Medications











 Medication  Instructions  Recorded  Confirmed  Type


 


Ascorbic Acid [Vitamin C] 1,000 mg PO DAILY 08/21/19 12/28/22 History


 


Aspirin 81 mg PO DAILY 08/21/19 12/28/22 History


 


Losartan [Cozaar] 50 mg PO DAILY 08/21/19 12/28/22 History


 


Metoprolol Tartrate [Lopressor] 25 mg PO BID 08/21/19 12/28/22 History


 


Nitroglycerin Sl Tabs [Nitrostat] 0.4 mg SL Q5M PRN 08/21/19 12/28/22 History


 


Omeprazole [PriLOSEC] 40 mg PO HS 08/21/19 12/28/22 History


 


Ubidecarenone [Co Q-10] 100 mg PO DAILY 08/21/19 12/28/22 History


 


Docusate [Colace] 100 mg PO BID 09/02/21 12/28/22 History


 


Albuterol Inhaler [Ventolin Hfa 1 - 2 puff INHALATION RT-QID PRN 12/07/22 12/28/22 History





Inhaler]    


 


Fluticasone/Vilanterol [Breo 1 puff INHALATION RT-QID PRN 12/07/22 12/28/22 

History





Ellipta 200-25 Mcg Inhaler]    


 


Cranberry Fruit [Cranberry] 465 mg PO DAILY 12/12/22 12/28/22 History


 


Furosemide [Lasix] 40 mg PO DAILY PRN 12/12/22 12/28/22 History


 


Quercetin 800 mg PO BID 12/12/22 12/28/22 History


 


Zinc Gluconate [Zinc] 50 mg PO DAILY 12/12/22 12/28/22 History


 


Pantoprazole [Protonix] 40 mg PO DAILY #30 tab 12/20/22 12/28/22 Rx


 


Centrum Cardio 2 tab PO DAILY 12/28/22 12/28/22 History


 


Cholecalciferol [Vitamin D3 (25 50 mcg PO DAILY 12/28/22 12/28/22 History





Mcg = 1000 Iu)]    


 


Prosynbiotic  1 tab PO BID 12/28/22 12/28/22 History


 


ceFAZolin [Kefzol] 2 gm IVP Q8H 12/28/22 12/28/22 History








                                    Allergies











Allergy/AdvReac Type Severity Reaction Status Date / Time


 


adhesive tape Allergy  Rash/Hives,"paper Verified 12/28/22 11:45





   tape is ok"  


 


amoxicillin [From Augmentin] Allergy  Rash/Hives Verified 12/28/22 11:45


 


atorvastatin [From Lipitor] Allergy  Muscle Verified 12/28/22 11:45





   Aches  


 


budesonide [From Symbicort] Allergy  Vision Verified 12/28/22 11:45





   issues  


 


ciprofloxacin Allergy  Muscle Verified 12/28/22 11:45





   aches  


 


clavulanic acid Allergy  Rash/Hives Verified 12/28/22 11:45





[From Augmentin]     


 


doxycycline Allergy  Rectal Verified 12/28/22 11:45





   Bleeding  


 


enalapril Allergy  Cough Verified 12/28/22 11:45


 


ezetimibe [From Vytorin] Allergy  Muscle Verified 12/28/22 11:45





   Aches  


 


fluticasone Allergy  Irregular Verified 12/28/22 11:45





[From Advair Diskus]   heart rate  


 


formoterol [From Symbicort] Allergy  Vision Verified 12/28/22 11:45





   issues  


 


mold Allergy  Rash/Hives Verified 12/28/22 11:45


 


salmeterol Allergy  Irregular Verified 12/28/22 11:45





[From Advair Diskus]   heart rate  


 


simvastatin [From Vytorin] Allergy  Muscle Verified 12/28/22 11:45





   Aches  


 


Statins-HMG-CoA Reductase Allergy  Muscle Verified 12/28/22 11:45





Inhibitor   aches  





[Statins-Hmg-Coa Reductase     





Inhibitor]     


 


nilotinib [From Tasigna] AdvReac  Pancreatiti Verified 12/28/22 11:45





   s  


 


Cotton material Allergy  itching, Uncoded 12/28/22 11:45





   hives  














Physical Exam


Vitals: 


                                   Vital Signs











  Temp Pulse Resp BP Pulse Ox


 


 12/28/22 07:35  98.1 F  73  16  169/81  97


 


 12/28/22 06:33  97.7 F  77  18  161/86  96








                                Intake and Output











 12/27/22 12/28/22 12/28/22





 22:59 06:59 14:59


 


Other:   


 


  Weight  97.522 kg 











GENERAL DESCRIPTION: Elderly male lying in bed, no distress. No tachypnea or 

accessory muscle of respiration use.


HEENT: Shows Pallor , no scleral icterus. Oral mucous membrane is dry. No 

pharyngeal erythema or thrush


NECK: Trachea central, no thyromegaly.


LUNGS: Unlabored breathing. Clear to auscultation anteriorly. No wheeze or 

crackle.


HEART: S1, S2, regular rate and rhythm. No loud murmur


ABDOMEN: Soft, no tenderness , guarding or rigidity, no organomegaly


EXTREMITIES: Right wrist dorsum did have minimal swelling is very tender to 

touch especially at the wrist joint


SKIN: No rash, no masses palpable.


NEUROLOGICAL: The patient is awake, alert, oriented x3, mood and affect normal.

















Results


CBC & Chem 7: 


                                 12/28/22 07:37





                                 12/28/22 07:37


Labs: 


                  Abnormal Lab Results - Last 24 Hours (Table)











  12/28/22 12/28/22 Range/Units





  07:37 07:37 


 


RBC  3.11 L   (4.30-5.90)  m/uL


 


Hgb  10.1 L   (13.0-17.5)  gm/dL


 


Hct  30.1 L   (39.0-53.0)  %


 


Chloride   109 H  ()  mmol/L


 


BUN   21 H  (9-20)  mg/dL


 


Glucose   117 H  (74-99)  mg/dL














Assessment and Plan


(1) Right wrist pain


Status: Acute   Code(s): M25.531 - PAIN IN RIGHT WRIST   SNOMED Code(s): 

74653068


   





(2) Swelling of right wrist


Status: Acute   Code(s): M25.431 - EFFUSION, RIGHT WRIST   SNOMED Code(s): 

574936269


   


Plan: 


1patient presented to hospital with right wrist pain swelling redness question 

of possible inflammatory arthritis clinically not behaving as cellulitis with no

significant redness was noticed patient not running any fever and white count is

normal.


2we will obtain CRP sed rate and uric acid level.


3Marked area of the swelling and may benefit from light Ace wrap


4continue with the cefazolin 2 g every 8 hours while awaiting further work-up 

to be completed


We will follow on clinical condition and cultures to further adjust medication 

if needed


Thank you for this consultation will follow this patient along with you





Time with Patient: Greater than 30

## 2022-12-29 VITALS — SYSTOLIC BLOOD PRESSURE: 152 MMHG | HEART RATE: 61 BPM | DIASTOLIC BLOOD PRESSURE: 76 MMHG | TEMPERATURE: 97.6 F

## 2022-12-29 VITALS — RESPIRATION RATE: 18 BRPM

## 2022-12-29 RX ADMIN — METOPROLOL TARTRATE SCH MG: 25 TABLET, FILM COATED ORAL at 07:59

## 2022-12-29 RX ADMIN — LACTOBACILLUS ACIDOPHILUS / LACTOBACILLUS BULGARICUS SCH EACH: 100 MILLION CFU STRENGTH GRANULES at 08:00

## 2022-12-29 RX ADMIN — CEFAZOLIN SCH MLS/HR: 330 INJECTION, POWDER, FOR SOLUTION INTRAMUSCULAR; INTRAVENOUS at 13:39

## 2022-12-29 RX ADMIN — DOCUSATE SODIUM SCH MG: 100 CAPSULE, LIQUID FILLED ORAL at 08:00

## 2022-12-29 NOTE — P.PN
Subjective


Progress Note Date: 12/29/22


Principal diagnosis: 


Right wrist tenosynovitis





Patient is a 77-year-old  male who was recently admitted at this 

facility and did have MSSA bacteremia patient did have extensive work-up 

including a WBC scan that was negative patient did clear his bacteremia he did 

get a midline and is currently getting cefazolin 2 g every 8 hours at home 

patient now presenting back to the hospital concerning for right wrist and hand 

area swelling with initial concern for possible cellulitis.


On today's evaluation that is 12/29/2022, the patient denies having any fever 

and oriented, patient right wrist pain and swelling has much improved almost 

resolved, patient denies having any chest pain or shortness of breath or cough 

no nausea no vomiting no abdominal pain no diarrhea feeling better wants to go 

home








Objective





- Vital Signs


Vital signs: 


                                   Vital Signs











Temp  97.7 F   12/29/22 07:10


 


Pulse  53 L  12/29/22 07:10


 


Resp  18   12/29/22 07:10


 


BP  140/71   12/29/22 07:10


 


Pulse Ox  98   12/29/22 07:10


 


FiO2      








                                 Intake & Output











 12/28/22 12/29/22 12/29/22





 18:59 06:59 18:59


 


Intake Total  1550 


 


Balance  1550 


 


Weight 97.522 kg  


 


Intake:   


 


  Intake, IV Titration  950 





  Amount   


 


    Sodium Chloride 0.9% 1,  900 





    000 ml @ 75 mls/hr IV .   





    [id number] [facility] Rx#:[id number]   


 


    ceFAZolin 2 gm In Sodium  50 





    Chloride 0.9% 50 ml @ 100   





    mls/hr IVPB Q8HR [facility] Rx#   





    :[id number]   


 


  Oral  600 


 


Other:   


 


  Voiding Method  Toilet 


 


  # Voids 1 3 














- Exam


GENERAL DESCRIPTION: An elderly male lying in bed in no distress





RESPIRATORY SYSTEM: Unlabored breathing , decreased breath sounds at bases





HEART: S1 S2 regular rate and rhythm ,





ABDOMEN: Soft , no tenderness





EXTREMITIES: Right wrist currently with no swelling redness or tenderness








- Labs


CBC & Chem 7: 


                                 12/28/22 07:37





                                 12/28/22 07:37


Labs: 


                  Abnormal Lab Results - Last 24 Hours (Table)











  12/28/22 12/28/22 Range/Units





  07:37 07:37 


 


ESR   28 H  (0-15)  mm/hr


 


Uric Acid  3.0 L   (3.5-8.5)  mg/dL








                      Microbiology - Last 24 Hours (Table)











 12/28/22 07:30 Blood Culture - Preliminary





 Blood    No Growth after 24 hours


 


 12/28/22 07:20 Blood Culture - Preliminary





 Blood    No Growth after 24 hours














Assessment and Plan


(1) Right wrist pain


Status: Acute   Code(s): M25.531 - PAIN IN RIGHT WRIST   SNOMED Code(s): 

59210855


   


Plan: 


1patient presented to hospital with right wrist pain swelling redness question 

of possible inflammatory arthritis clinically not behaving as cellulitis with no

significant redness was noticed patient not running any fever and white count is

normal.  Patient seemed to have shown significant improvement with steroids and 

should be continued in the outpatient setting


2patient tocontinue with the cefazolin 2 g every 8 hours to finish his course 

of treatment For his previous bacteremia and a close outpatient follow-up


Time with Patient: Less than 30

## 2022-12-29 NOTE — P.DS
Providers


Date of admission: 


12/28/22 12:11





Expected date of discharge: 12/29/22


Attending physician: 


Jen Delacruz DO





Consults: 





                                        





12/28/22 12:08


Consult Physician Stat 


   Consulting Provider: Dai Gordon


   Consult Reason/Comments: possible right wrist cellulitis, on IV cefazolin at 

home for


                                                bacteremia


   Do you want consulting provider notified?: Yes





12/28/22 14:53


Consult Physician Urgent 


   Consulting Provider: Bright Moss


   Consult Reason/Comments: right wrist synovitis, concern for gout/pseudogout, 

may need tap


   Do you want consulting provider notified?: Yes











Primary care physician: 


[facility] Course: 





77-year-old male with history of recent MSSA bacteremia with negative work up 

including WBC scan, was discharged on IV cefazolin, hx of CML, coronary artery 

disease, aortic valve stenosis and insufficiency presenting with right wrist 

redness, swelling and pain. He had recent cardiac catheterization prior to lul

teremia through right radial axis.  The pain was radiating up the forearm.  

Denies any new mechanical injury.  He has had some fevers at home.  He denies 

any chest pain, shortness of breath, abdominal pain, nausea, vomiting, diarrhea,

constipation, or urinary complaints.





In the ED, he was afebrile.  His white count was 7.2, hemoglobin 10.1, platelets

309.  His electrolytes were unremarkable.  Hand and wrist x-ray showed 

degenerative joint changes, but no acute process.  He was given a dose of 

vancomycin in the ED, and admitted for further workup.  Right upper extremity 

venous Doppler did not show any signs of DVT.





He was resumed on cefazolin upon admission, but continued to be afebrile, uric 

acid came back low.  Redness, swelling and pain improved significantly. He will 

be discharged home in a stable condition.





Patient was seen and examined on the day of discharge 12/29





Time for discharge 35 minutes.


Patient Condition at Discharge: Good





Plan - Discharge Summary


Discharge Rx Participant: No


New Discharge Prescriptions: 


No Action


   Nitroglycerin Sl Tabs [Nitrostat] 0.4 mg SL Q5M PRN


     PRN Reason: Pain


   Metoprolol Tartrate [Lopressor] 25 mg PO BID


   Losartan [Cozaar] 50 mg PO DAILY


   Aspirin 81 mg PO DAILY


   Ubidecarenone [Co Q-10] 100 mg PO DAILY


   Omeprazole [PriLOSEC] 40 mg PO HS


   Ascorbic Acid [Vitamin C] 1,000 mg PO DAILY


   Pantoprazole [Protonix] 40 mg PO DAILY #30 tab


   Prosynbiotic  1 tab PO BID


   ceFAZolin [Kefzol] 2 gm IVP Q8H


   Docusate [Colace] 100 mg PO BID


   Albuterol Inhaler [Ventolin Hfa Inhaler] 1 - 2 puff INHALATION RT-QID PRN


     PRN Reason: Shortness Of Breath


   Fluticasone/Vilanterol [Breo Ellipta 200-25 Mcg Inhaler] 1 puff INHALATION 

RT-QID PRN


     PRN Reason: Shortness Of Breath


   Quercetin 800 mg PO BID


   Zinc Gluconate [Zinc] 50 mg PO DAILY


   Cranberry Fruit [Cranberry] 465 mg PO DAILY


   Furosemide [Lasix] 40 mg PO DAILY PRN


     PRN Reason: fluid retention


   Cholecalciferol [Vitamin D3 (25 Mcg = 1000 Iu)] 50 mcg PO DAILY


   Centrum Cardio 2 tab PO DAILY


Discharge Medication List





Ascorbic Acid [Vitamin C] 1,000 mg PO DAILY 08/21/19 [History]


Aspirin 81 mg PO DAILY 08/21/19 [History]


Losartan [Cozaar] 50 mg PO DAILY 08/21/19 [History]


Metoprolol Tartrate [Lopressor] 25 mg PO BID 08/21/19 [History]


Nitroglycerin Sl Tabs [Nitrostat] 0.4 mg SL Q5M PRN 08/21/19 [History]


Omeprazole [PriLOSEC] 40 mg PO HS 08/21/19 [History]


Ubidecarenone [Co Q-10] 100 mg PO DAILY 08/21/19 [History]


Docusate [Colace] 100 mg PO BID 09/02/21 [History]


Albuterol Inhaler [Ventolin Hfa Inhaler] 1 - 2 puff INHALATION RT-QID PRN 

12/07/22 [History]


Fluticasone/Vilanterol [Breo Ellipta 200-25 Mcg Inhaler] 1 puff INHALATION RT-

QID PRN 12/07/22 [History]


Cranberry Fruit [Cranberry] 465 mg PO DAILY 12/12/22 [History]


Furosemide [Lasix] 40 mg PO DAILY PRN 12/12/22 [History]


Quercetin 800 mg PO BID 12/12/22 [History]


Zinc Gluconate [Zinc] 50 mg PO DAILY 12/12/22 [History]


Pantoprazole [Protonix] 40 mg PO DAILY #30 tab 12/20/22 [Rx]


Centrum Cardio 2 tab PO DAILY 12/28/22 [History]


Cholecalciferol [Vitamin D3 (25 Mcg = 1000 Iu)] 50 mcg PO DAILY 12/28/22 

[History]


Prosynbiotic  1 tab PO BID 12/28/22 [History]


ceFAZolin [Kefzol] 2 gm IVP Q8H 12/28/22 [History]








Follow up Appointment(s)/Referral(s): 


[facility], [NON-STAFF] - 1 Week


None,Stated [REFERRING] - 1-2 days


[facility], [REFERRING] - 1 Week

## 2022-12-29 NOTE — P.PN
Subjective


Progress Note Date: 12/29/22


Principal diagnosis: 





right wrist synovitis


concern for gout/pseudogout





Patient seen and examined this morning.  Patient was sitting at the edge of bed 

and had just finished his breakfast.  Patient states that he is feeling great 

this morning, that there has been improvement in his range of motion of his 

right wrist.  He is able to perform flexion and extension exercises with his 

wrist and hand.  Patient does report that he has some weakness when picking up 

his coffee cup. He states that he notices since the dose of Daptomycin there has

been decreased edema and redness to his right wrist.  Patient has been afebrile,

denies any nausea/vomiting, or chest pain.





Objective





- Vital Signs


Vital signs: 


                                   Vital Signs











Temp  97.7 F   12/29/22 07:10


 


Pulse  53 L  12/29/22 07:10


 


Resp  18   12/29/22 07:10


 


BP  140/71   12/29/22 07:10


 


Pulse Ox  98   12/29/22 07:10


 


FiO2      








                                 Intake & Output











 12/28/22 12/29/22 12/29/22





 18:59 06:59 18:59


 


Intake Total  1550 


 


Balance  1550 


 


Weight 97.522 kg  


 


Intake:   


 


  Intake, IV Titration  950 





  Amount   


 


    Sodium Chloride 0.9% 1,  900 





    000 ml @ 75 mls/hr IV .   





    [id number] [facility] Rx#:[id number]   


 


    ceFAZolin 2 gm In Sodium  50 





    Chloride 0.9% 50 ml @ 100   





    mls/hr IVPB Q8HR [facility] Rx#   





    :[id number]   


 


  Oral  600 


 


Other:   


 


  Voiding Method  Toilet 


 


  # Voids 1 3 














- Exam





Physical Examination





General:  The patient is awake and alert, in no acute distress





Skin:  Skin is warm and dry with no obvious rashes or lesions. Edema and 

Erythema that was present to right wrist has improved. 





Eye: Pupils are equal, round and reactive to light, extra-ocular movements are 

intact; there is normal conjunctiva bilaterally.  





Neck:  The neck is supple, there is no tenderness and ROM intact.





Cardiovascular:  There is a regular rate and rhythm. No murmur, rub or gallop is

appreciated.





Respiratory:  Lungs are clear to auscultation, respirations are non-labored, 

breath sounds are equal. 


 


Gastrointestinal:  Soft, non-distended, non-tender abdomen.





Back:  There is no tenderness to palpation in the midline, paralumbar, 

parathoracic or buttocks region. There is no obvious deformity


.


Musculoskeletal: FROM. Muscle strength in all major muscle groups of bilateral 

upper extremities 5/5, bilateral lower extremities 5/5.





Neurological:  CN 2-12 intact. There are no obvious motor or sensory deficits. 

Movement and coordination equal and intact. Sensory exam to light touch intact 

C5-T1 and intact from L2-S1. Reflexes 2/4 in bilateral upper and lower 

extremities. Negative Hoffmans, babinski, and clonus signs. 





Psychiatric:  Cooperative, appropriate mood & affect, normal judgment. 





- Labs


CBC & Chem 7: 


                                 12/28/22 07:37





                                 12/28/22 07:37


Labs: 


                  Abnormal Lab Results - Last 24 Hours (Table)











  12/28/22 12/28/22 Range/Units





  07:37 07:37 


 


ESR   28 H  (0-15)  mm/hr


 


Uric Acid  3.0 L   (3.5-8.5)  mg/dL














Assessment and Plan


Assessment: 





Osteoarthritis first Carpometacarpal joint right hand





Cellulitis right wrist





Plan: 





  Appreciate medical management





  Continue with antibiotic therapy





  Pain management - tylenol prn





  Ice and Elevate





  DVT prophylaxis - Lovenox; aspirin





  GI prophylaxis - Protonix; Colace





  PT/OT - weightbearing as tolerated





  Appreciate consult, we will be signing off at this time.  Patient may follow 

up in office as needed.  Please feel free to contact for any questions or 

concerns.








*I reviewed and discussed this case with my attending Dr. Moss, whom has 

reviewed this chart and films and is in agreement with assessment and plan of 

care as outlined above.





I have personally seen and examined the patient, performed the documentation and

the assessment and plan as written.  





Number of minutes spent on the visit: 15m.

## 2023-01-01 ENCOUNTER — HOSPITAL ENCOUNTER (EMERGENCY)
Dept: HOSPITAL 47 - EC | Age: 78
Discharge: HOME | End: 2023-01-01
Payer: MEDICARE

## 2023-01-01 VITALS
SYSTOLIC BLOOD PRESSURE: 157 MMHG | RESPIRATION RATE: 18 BRPM | HEART RATE: 78 BPM | TEMPERATURE: 99.4 F | DIASTOLIC BLOOD PRESSURE: 79 MMHG

## 2023-01-01 DIAGNOSIS — Z91.048: ICD-10-CM

## 2023-01-01 DIAGNOSIS — Z20.822: ICD-10-CM

## 2023-01-01 DIAGNOSIS — J10.1: Primary | ICD-10-CM

## 2023-01-01 DIAGNOSIS — Z87.891: ICD-10-CM

## 2023-01-01 DIAGNOSIS — Z88.1: ICD-10-CM

## 2023-01-01 DIAGNOSIS — I25.10: ICD-10-CM

## 2023-01-01 DIAGNOSIS — Z79.82: ICD-10-CM

## 2023-01-01 DIAGNOSIS — I10: ICD-10-CM

## 2023-01-01 DIAGNOSIS — Z79.899: ICD-10-CM

## 2023-01-01 DIAGNOSIS — Z88.0: ICD-10-CM

## 2023-01-01 DIAGNOSIS — Z88.8: ICD-10-CM

## 2023-01-01 DIAGNOSIS — K21.9: ICD-10-CM

## 2023-01-01 LAB
ALBUMIN SERPL-MCNC: 3.7 G/DL (ref 3.5–5)
ALP SERPL-CCNC: 65 U/L (ref 38–126)
ALT SERPL-CCNC: 8 U/L (ref 4–49)
ANION GAP SERPL CALC-SCNC: 3 MMOL/L
APTT BLD: 24.9 SEC (ref 22–30)
AST SERPL-CCNC: 22 U/L (ref 17–59)
BASOPHILS # BLD AUTO: 0 K/UL (ref 0–0.2)
BASOPHILS NFR BLD AUTO: 1 %
BUN SERPL-SCNC: 18 MG/DL (ref 9–20)
CALCIUM SPEC-MCNC: 8.7 MG/DL (ref 8.4–10.2)
CHLORIDE SERPL-SCNC: 107 MMOL/L (ref 98–107)
CO2 SERPL-SCNC: 29 MMOL/L (ref 22–30)
EOSINOPHIL # BLD AUTO: 0.1 K/UL (ref 0–0.7)
EOSINOPHIL NFR BLD AUTO: 1 %
ERYTHROCYTE [DISTWIDTH] IN BLOOD BY AUTOMATED COUNT: 2.8 M/UL (ref 4.3–5.9)
ERYTHROCYTE [DISTWIDTH] IN BLOOD: 14.1 % (ref 11.5–15.5)
GLUCOSE SERPL-MCNC: 116 MG/DL (ref 74–99)
HCT VFR BLD AUTO: 27.1 % (ref 39–53)
HGB BLD-MCNC: 9.3 GM/DL (ref 13–17.5)
INR PPP: 1 (ref ?–1.2)
LYMPHOCYTES # SPEC AUTO: 0.7 K/UL (ref 1–4.8)
LYMPHOCYTES NFR SPEC AUTO: 17 %
MAGNESIUM SPEC-SCNC: 1.8 MG/DL (ref 1.6–2.3)
MCH RBC QN AUTO: 33.1 PG (ref 25–35)
MCHC RBC AUTO-ENTMCNC: 34.2 G/DL (ref 31–37)
MCV RBC AUTO: 96.7 FL (ref 80–100)
MONOCYTES # BLD AUTO: 0.4 K/UL (ref 0–1)
MONOCYTES NFR BLD AUTO: 9 %
NEUTROPHILS # BLD AUTO: 3.1 K/UL (ref 1.3–7.7)
NEUTROPHILS NFR BLD AUTO: 71 %
PH UR: 6.5 [PH] (ref 5–8)
PLATELET # BLD AUTO: 197 K/UL (ref 150–450)
POTASSIUM SERPL-SCNC: 3.9 MMOL/L (ref 3.5–5.1)
PROT SERPL-MCNC: 6.5 G/DL (ref 6.3–8.2)
PT BLD: 10.9 SEC (ref 9–12)
SODIUM SERPL-SCNC: 139 MMOL/L (ref 137–145)
SP GR UR: 1.02 (ref 1–1.03)
UROBILINOGEN UR QL STRIP: <2 MG/DL (ref ?–2)
WBC # BLD AUTO: 4.3 K/UL (ref 3.8–10.6)

## 2023-01-01 PROCEDURE — 83735 ASSAY OF MAGNESIUM: CPT

## 2023-01-01 PROCEDURE — 36415 COLL VENOUS BLD VENIPUNCTURE: CPT

## 2023-01-01 PROCEDURE — 85025 COMPLETE CBC W/AUTO DIFF WBC: CPT

## 2023-01-01 PROCEDURE — 71046 X-RAY EXAM CHEST 2 VIEWS: CPT

## 2023-01-01 PROCEDURE — 87502 INFLUENZA DNA AMP PROBE: CPT

## 2023-01-01 PROCEDURE — 80053 COMPREHEN METABOLIC PANEL: CPT

## 2023-01-01 PROCEDURE — 83880 ASSAY OF NATRIURETIC PEPTIDE: CPT

## 2023-01-01 PROCEDURE — 85610 PROTHROMBIN TIME: CPT

## 2023-01-01 PROCEDURE — 93005 ELECTROCARDIOGRAM TRACING: CPT

## 2023-01-01 PROCEDURE — 81003 URINALYSIS AUTO W/O SCOPE: CPT

## 2023-01-01 PROCEDURE — 83605 ASSAY OF LACTIC ACID: CPT

## 2023-01-01 PROCEDURE — 87635 SARS-COV-2 COVID-19 AMP PRB: CPT

## 2023-01-01 PROCEDURE — 99285 EMERGENCY DEPT VISIT HI MDM: CPT

## 2023-01-01 PROCEDURE — 85730 THROMBOPLASTIN TIME PARTIAL: CPT

## 2023-01-01 PROCEDURE — 84484 ASSAY OF TROPONIN QUANT: CPT

## 2023-01-01 NOTE — ED
Weakness HPI





- General


Chief complaint: Weakness


Stated complaint: re-check


Time Seen by Provider: 01/01/23 20:35


Source: patient, family


Mode of arrival: wheelchair


Limitations: no limitations





- History of Present Illness


Initial comments: 





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 

coronary artery disease, MSSA bacteremia who presents to the emergency 

department reporting fever.  He has been hospitalized multiple times recently 

for fever.  First time he was diagnosed with pneumonia and bacteremia.  Patient 

then had a heart cath with MORGAN and required to be hospitalized again for right 

wrist swelling.  He has a midline in his left arm and is on antibiotics for his 

bacteremia.  Wife reports that tomorrow is his last dose.  Today the patient 

began developing sinus congestion and fevers.  They're told that anytime the 

patient has fevers he needs to come into the emergency room.  He denies any ear 

pain or sore throat.  No chest pain.  No cough.  Denies rashes.  Did take 2 

Tylenol before coming into the emergency room.  No other alleviating, 

precipitator modifying factors





- Related Data


                                Home Medications











 Medication  Instructions  Recorded  Confirmed


 


Ascorbic Acid [Vitamin C] 1,000 mg PO DAILY 08/21/19 12/28/22


 


Aspirin 81 mg PO DAILY 08/21/19 12/28/22


 


Losartan [Cozaar] 50 mg PO DAILY 08/21/19 12/28/22


 


Metoprolol Tartrate [Lopressor] 25 mg PO BID 08/21/19 12/28/22


 


Nitroglycerin Sl Tabs [Nitrostat] 0.4 mg SL Q5M PRN 08/21/19 12/28/22


 


Omeprazole [PriLOSEC] 40 mg PO HS 08/21/19 12/28/22


 


Ubidecarenone [Co Q-10] 100 mg PO DAILY 08/21/19 12/28/22


 


Docusate [Colace] 100 mg PO BID 09/02/21 12/28/22


 


Albuterol Inhaler [Ventolin Hfa 1 - 2 puff INHALATION RT-QID PRN 12/07/22 12/28/22





Inhaler]   


 


Fluticasone/Vilanterol [Breo 1 puff INHALATION RT-QID PRN 12/07/22 12/28/22





Ellipta 200-25 Mcg Inhaler]   


 


Cranberry Fruit [Cranberry] 465 mg PO DAILY 12/12/22 12/28/22


 


Furosemide [Lasix] 40 mg PO DAILY PRN 12/12/22 12/28/22


 


Quercetin 800 mg PO BID 12/12/22 12/28/22


 


Zinc Gluconate [Zinc] 50 mg PO DAILY 12/12/22 12/28/22


 


Centrum Cardio 2 tab PO DAILY 12/28/22 12/28/22


 


Cholecalciferol [Vitamin D3 (25 50 mcg PO DAILY 12/28/22 12/28/22





Mcg = 1000 Iu)]   


 


Prosynbiotic  1 tab PO BID 12/28/22 12/28/22


 


ceFAZolin [Kefzol] 2 gm IVP Q8H 12/28/22 12/28/22








                                  Previous Rx's











 Medication  Instructions  Recorded


 


Pantoprazole [Protonix] 40 mg PO DAILY #30 tab 12/20/22


 


Oseltamivir [Tamiflu] 75 mg PO Q12HR #10 cap 01/01/23











                                    Allergies











Allergy/AdvReac Type Severity Reaction Status Date / Time


 


adhesive tape Allergy  Rash/Hives,"paper Verified 12/28/22 11:45





   tape is ok"  


 


amoxicillin [From Augmentin] Allergy  Rash/Hives Verified 12/28/22 11:45


 


atorvastatin [From Lipitor] Allergy  Muscle Verified 12/28/22 11:45





   Aches  


 


budesonide [From Symbicort] Allergy  Vision Verified 12/28/22 11:45





   issues  


 


ciprofloxacin Allergy  Muscle Verified 12/28/22 11:45





   aches  


 


clavulanic acid Allergy  Rash/Hives Verified 01/01/23 20:35





[From Augmentin]     


 


doxycycline Allergy  Rectal Verified 01/01/23 20:35





   Bleeding  


 


enalapril Allergy  Cough Verified 01/01/23 20:35


 


ezetimibe [From Vytorin] Allergy  Muscle Verified 01/01/23 20:35





   Aches  


 


fluticasone Allergy  Irregular Verified 01/01/23 20:35





[From Advair Diskus]   heart rate  


 


formoterol [From Symbicort] Allergy  Vision Verified 01/01/23 20:35





   issues  


 


mold Allergy  Rash/Hives Verified 01/01/23 20:35


 


salmeterol Allergy  Irregular Verified 01/01/23 20:35





[From Advair Diskus]   heart rate  


 


simvastatin [From Vytorin] Allergy  Muscle Verified 01/01/23 20:35





   Aches  


 


Statins-HMG-CoA Reductase Allergy  Muscle Verified 01/01/23 20:35





Inhibitor   aches  





[Statins-Hmg-Coa Reductase     





Inhibitor]     


 


nilotinib [From Tasigna] AdvReac  Pancreatiti Verified 12/28/22 11:45





   s  


 


Cotton material Allergy  itching, Uncoded 12/28/22 11:45





   hives  














Review of Systems


ROS Statement: 


Those systems with pertinent positive or pertinent negative responses have been 

documented in the HPI.





ROS Other: All systems not noted in ROS Statement are negative.





Past Medical History


Past Medical History: Coronary Artery Disease (CAD), Cancer, GERD/Reflux, 

Hypertension, Skin Disorder


Additional Past Medical History / Comment(s): Hx. of CML, Hx. of Shingles L eye,

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


History of Any Multi-Drug Resistant Organisms: None Reported


Past Surgical History: Cholecystectomy, Heart Catheterization With Stent, Hernia

Repair, Orthopedic Surgery


Additional Past Surgical History / Comment(s): Thyroid surgery,Vasectomy, R 

ankle surgery, Sinus surgery, Basal cell R shoulder, Pyloroplasty & Vagotomy, 

Hemorrhoidectomy, Cataract surgery & bx. of inner thigh.


Past Anesthesia/Blood Transfusion Reactions: No Reported Reaction


Date of Last Stent Placement:: 2006


Past Psychological History: No Psychological Hx Reported


Smoking Status: Former smoker


Past Alcohol Use History: None Reported


Past Drug Use History: None Reported





- Past Family History


  ** Mother


Family Medical History: Cancer, Pulmonary Embolus





  ** Father


Family Medical History: Cancer





  ** Brother(s)


Family Medical History: Cancer





  ** Son(s)


Family Medical History: Cancer





General Exam


Limitations: no limitations


General appearance: alert, in no apparent distress


Head exam: Present: atraumatic, normocephalic, normal inspection


Eye exam: Present: normal appearance, PERRL, EOMI.  Absent: scleral icterus, 

conjunctival injection, periorbital swelling


ENT exam: Present: normal exam, mucous membranes moist


Neck exam: Present: normal inspection.  Absent: tenderness, meningismus, 

lymphadenopathy


Respiratory exam: Present: normal lung sounds bilaterally.  Absent: respiratory 

distress, wheezes, rales, rhonchi, stridor


Cardiovascular Exam: Present: regular rate, normal rhythm, normal heart sounds. 

Absent: systolic murmur, diastolic murmur, rubs, gallop, clicks


GI/Abdominal exam: Present: soft, normal bowel sounds.  Absent: distended, 

tenderness, guarding, rebound, rigid


Extremities exam: Present: full ROM, normal capillary refill, other (Some 

swelling to the right lower extremity which is chronic for the patient due to 

reconstruction of the ankle.  He has a midline in the left upper extremity 

without redness, warmth or swelling).  Absent: tenderness, pedal edema, joint 

swelling, calf tenderness


Back exam: Present: normal inspection


Neurological exam: Present: alert, oriented X3, CN II-XII intact


Psychiatric exam: Present: normal affect, normal mood


Skin exam: Present: warm, dry, intact, normal color.  Absent: rash





Course


                                   Vital Signs











  01/01/23





  20:30


 


Temperature 99.4 F


 


Pulse Rate 78


 


Respiratory 18





Rate 


 


Blood Pressure 157/79


 


O2 Sat by Pulse 96





Oximetry 














EKG Findings





- EKG Comments:


EKG Findings:: EKG demonstrates sinus rhythm with a rate of 76.  UT interval 

182.  QRS 99.  .  No acute ST segment elevations or depressions.  EKG 

interpreted by myself





Medical Decision Making





- Medical Decision Making





Was pt. sent in by a medical professional or institution?


@  -No


Did you speak to anyone other than the patient for history?  


@  -No


Did you review nursing and triage notes? 


@  -Yes and I agree


Were old charts reviewed? 


@  -Reviewed the patient's previous inpatient admissions which include his 

diagnosis of bacteremia and infectious disease consults.


Differential Diagnosis? 


@  -Differential Fever:


Pneumonia, viral URI, endocarditis, myocarditis, pericarditis, otitis, 

sinusitis, peritonsillar Abscess, retropharyngeal Abscess, epiglottitis, 

peritonitis, appendicitis, Soila cystitis, diverticulitis, hepatitis, colitis, 

UTI, PID, TOA, pyelonephritis, prostatitis, epididymitis, meningitis, 

encephalitis, pulmonary embolism, CVA, thyroid storm, pancreatitis, adrenal 

crisis, cavernous sinus thrombosis, this is not meant to be an all-inclusive 

list. 


EKG interpreted by me (3pts min.)?


@  -Yes


X-rays interpreted by me (1pt min.)?


@  -Yes


CT interpreted by me (1pt min.)?


@  -No


U/S interpreted by me (1pt. min.)?


@  -No


What testing was considered but not performed? (CT, X-rays, U/S, labs)? Why?


@None   


What meds were considered but not given? Why?


@  -No


Did you discuss the management of the patient with other professionals?


@  No


Did you reconcile home meds?


@  No   


Was smoking cessation discussed for >3mins.?


@ No


Was critical care preformed (if so, how long)?


@  No


Were there social determinants of health that impacted care today? How? 

(Homelessness, low income, unemployed, alcoholism, drug addiction, 

transportation, low edu. Level, literacy, decrease access to med. care, [facility], 

rehab)?


@No


Was there de-escalation of care discussed even if they declined? (Discuss DNR or

withdrawal of care, Hospice)?


@  No


What co-morbidities impacted this encounter? (DM, HTN, Smoking, COPD, CAD, 

Cancer, CVA, Hep., AIDS, mental health diagnosis, sleep apnea, morbid obesity)?


@CML on chemotherapy


Was patient admitted / discharged?


@Discharged.  Upon arrival patient was placed into room 26.  A thorough history 

and physical exam was performed.  IV access is established and laboratory 

studies are conducted.  Patient reports to sinusitis and therefore I did swab 

him.  Patient is positive for influenza A.  As there is a reason for the patient

to have a fever I feel that the patient is stable for discharge home on Tamiflu.

 He is to continue his remaining antibiotics as directed.  Follow up with his 

doctor in the outpatient setting return for any new or worsening symptoms.  

Patient was discharged home in stable condition


Undiagnosed new problem with uncertain prognosis?


@  -No


Drug Therapy requiring intensive monitoring for toxicity (Heparin, Nitro, 

Insulin, Cardizem)?


@  -No


Were any procedures done?


@  -No


Diagnosis/symptom?


@  -Acute pyrexia due to influenza A


Acute, or Chronic, or Acute on Chronic?


@  -Acute


Uncomplicated (without systemic symptoms) or Complicated (systemic symptoms)?


@  -Complicated


Side effects of treatment?


@  -Nausea and vomiting


Exacerbation, Progression, or Severe Exacerbation]


@  -No


Poses a threat to life or bodily function?


@  -No





- Lab Data


Result diagrams: 


                                 01/01/23 21:11





                                 01/01/23 21:11


                                   Lab Results











  01/01/23 01/01/23 01/01/23 Range/Units





  21:11 21:11 21:11 


 


WBC  4.3    (3.8-10.6)  k/uL


 


RBC  2.80 L    (4.30-5.90)  m/uL


 


Hgb  9.3 L    (13.0-17.5)  gm/dL


 


Hct  27.1 L    (39.0-53.0)  %


 


MCV  96.7    (80.0-100.0)  fL


 


MCH  33.1    (25.0-35.0)  pg


 


MCHC  34.2    (31.0-37.0)  g/dL


 


RDW  14.1    (11.5-15.5)  %


 


Plt Count  197    (150-450)  k/uL


 


MPV  7.7    


 


Neutrophils %  71    %


 


Lymphocytes %  17    %


 


Monocytes %  9    %


 


Eosinophils %  1    %


 


Basophils %  1    %


 


Neutrophils #  3.1    (1.3-7.7)  k/uL


 


Lymphocytes #  0.7 L    (1.0-4.8)  k/uL


 


Monocytes #  0.4    (0-1.0)  k/uL


 


Eosinophils #  0.1    (0-0.7)  k/uL


 


Basophils #  0.0    (0-0.2)  k/uL


 


Poikilocytosis  Slight    


 


PT   10.9   (9.0-12.0)  sec


 


INR   1.0   (<1.2)  


 


APTT   24.9   (22.0-30.0)  sec


 


Sodium    139  (137-145)  mmol/L


 


Potassium    3.9  (3.5-5.1)  mmol/L


 


Chloride    107  ()  mmol/L


 


Carbon Dioxide    29  (22-30)  mmol/L


 


Anion Gap    3  mmol/L


 


BUN    18  (9-20)  mg/dL


 


Creatinine    0.86  (0.66-1.25)  mg/dL


 


Est GFR (CKD-EPI)AfAm    >90  (>60 ml/min/1.73 sqM)  


 


Est GFR (CKD-EPI)NonAf    84  (>60 ml/min/1.73 sqM)  


 


Glucose    116 H  (74-99)  mg/dL


 


Plasma Lactic Acid Dave     (0.7-2.0)  mmol/L


 


Calcium    8.7  (8.4-10.2)  mg/dL


 


Magnesium    1.8  (1.6-2.3)  mg/dL


 


Total Bilirubin    0.4  (0.2-1.3)  mg/dL


 


AST    22  (17-59)  U/L


 


ALT    8  (4-49)  U/L


 


Alkaline Phosphatase    65  ()  U/L


 


Troponin I     (0.000-0.034)  ng/mL


 


NT-Pro-B Natriuret Pep     pg/mL


 


Total Protein    6.5  (6.3-8.2)  g/dL


 


Albumin    3.7  (3.5-5.0)  g/dL


 


Urine Color     


 


Urine Appearance     (Clear)  


 


Urine pH     (5.0-8.0)  


 


Ur Specific Gravity     (1.001-1.035)  


 


Urine Protein     (Negative)  


 


Urine Glucose (UA)     (Negative)  


 


Urine Ketones     (Negative)  


 


Urine Blood     (Negative)  


 


Urine Nitrite     (Negative)  


 


Urine Bilirubin     (Negative)  


 


Urine Urobilinogen     (<2.0)  mg/dL


 


Ur Leukocyte Esterase     (Negative)  


 


Coronavirus (PCR)     (Not Detectd)  


 


Influenza Type A RNA     (Not Detectd)  


 


Influenza Type B (PCR)     (Not Detectd)  














  01/01/23 01/01/23 01/01/23 Range/Units





  21:11 21:11 21:11 


 


WBC     (3.8-10.6)  k/uL


 


RBC     (4.30-5.90)  m/uL


 


Hgb     (13.0-17.5)  gm/dL


 


Hct     (39.0-53.0)  %


 


MCV     (80.0-100.0)  fL


 


MCH     (25.0-35.0)  pg


 


MCHC     (31.0-37.0)  g/dL


 


RDW     (11.5-15.5)  %


 


Plt Count     (150-450)  k/uL


 


MPV     


 


Neutrophils %     %


 


Lymphocytes %     %


 


Monocytes %     %


 


Eosinophils %     %


 


Basophils %     %


 


Neutrophils #     (1.3-7.7)  k/uL


 


Lymphocytes #     (1.0-4.8)  k/uL


 


Monocytes #     (0-1.0)  k/uL


 


Eosinophils #     (0-0.7)  k/uL


 


Basophils #     (0-0.2)  k/uL


 


Poikilocytosis     


 


PT     (9.0-12.0)  sec


 


INR     (<1.2)  


 


APTT     (22.0-30.0)  sec


 


Sodium     (137-145)  mmol/L


 


Potassium     (3.5-5.1)  mmol/L


 


Chloride     ()  mmol/L


 


Carbon Dioxide     (22-30)  mmol/L


 


Anion Gap     mmol/L


 


BUN     (9-20)  mg/dL


 


Creatinine     (0.66-1.25)  mg/dL


 


Est GFR (CKD-EPI)AfAm     (>60 ml/min/1.73 sqM)  


 


Est GFR (CKD-EPI)NonAf     (>60 ml/min/1.73 sqM)  


 


Glucose     (74-99)  mg/dL


 


Plasma Lactic Acid Dave  0.7    (0.7-2.0)  mmol/L


 


Calcium     (8.4-10.2)  mg/dL


 


Magnesium     (1.6-2.3)  mg/dL


 


Total Bilirubin     (0.2-1.3)  mg/dL


 


AST     (17-59)  U/L


 


ALT     (4-49)  U/L


 


Alkaline Phosphatase     ()  U/L


 


Troponin I   <0.012   (0.000-0.034)  ng/mL


 


NT-Pro-B Natriuret Pep    897  pg/mL


 


Total Protein     (6.3-8.2)  g/dL


 


Albumin     (3.5-5.0)  g/dL


 


Urine Color     


 


Urine Appearance     (Clear)  


 


Urine pH     (5.0-8.0)  


 


Ur Specific Gravity     (1.001-1.035)  


 


Urine Protein     (Negative)  


 


Urine Glucose (UA)     (Negative)  


 


Urine Ketones     (Negative)  


 


Urine Blood     (Negative)  


 


Urine Nitrite     (Negative)  


 


Urine Bilirubin     (Negative)  


 


Urine Urobilinogen     (<2.0)  mg/dL


 


Ur Leukocyte Esterase     (Negative)  


 


Coronavirus (PCR)     (Not Detectd)  


 


Influenza Type A RNA     (Not Detectd)  


 


Influenza Type B (PCR)     (Not Detectd)  














  01/01/23 01/01/23 01/01/23 Range/Units





  21:11 21:11 22:40 


 


WBC     (3.8-10.6)  k/uL


 


RBC     (4.30-5.90)  m/uL


 


Hgb     (13.0-17.5)  gm/dL


 


Hct     (39.0-53.0)  %


 


MCV     (80.0-100.0)  fL


 


MCH     (25.0-35.0)  pg


 


MCHC     (31.0-37.0)  g/dL


 


RDW     (11.5-15.5)  %


 


Plt Count     (150-450)  k/uL


 


MPV     


 


Neutrophils %     %


 


Lymphocytes %     %


 


Monocytes %     %


 


Eosinophils %     %


 


Basophils %     %


 


Neutrophils #     (1.3-7.7)  k/uL


 


Lymphocytes #     (1.0-4.8)  k/uL


 


Monocytes #     (0-1.0)  k/uL


 


Eosinophils #     (0-0.7)  k/uL


 


Basophils #     (0-0.2)  k/uL


 


Poikilocytosis     


 


PT     (9.0-12.0)  sec


 


INR     (<1.2)  


 


APTT     (22.0-30.0)  sec


 


Sodium     (137-145)  mmol/L


 


Potassium     (3.5-5.1)  mmol/L


 


Chloride     ()  mmol/L


 


Carbon Dioxide     (22-30)  mmol/L


 


Anion Gap     mmol/L


 


BUN     (9-20)  mg/dL


 


Creatinine     (0.66-1.25)  mg/dL


 


Est GFR (CKD-EPI)AfAm     (>60 ml/min/1.73 sqM)  


 


Est GFR (CKD-EPI)NonAf     (>60 ml/min/1.73 sqM)  


 


Glucose     (74-99)  mg/dL


 


Plasma Lactic Acid Dave     (0.7-2.0)  mmol/L


 


Calcium     (8.4-10.2)  mg/dL


 


Magnesium     (1.6-2.3)  mg/dL


 


Total Bilirubin     (0.2-1.3)  mg/dL


 


AST     (17-59)  U/L


 


ALT     (4-49)  U/L


 


Alkaline Phosphatase     ()  U/L


 


Troponin I     (0.000-0.034)  ng/mL


 


NT-Pro-B Natriuret Pep     pg/mL


 


Total Protein     (6.3-8.2)  g/dL


 


Albumin     (3.5-5.0)  g/dL


 


Urine Color    Yellow  


 


Urine Appearance    Clear  (Clear)  


 


Urine pH    6.5  (5.0-8.0)  


 


Ur Specific Gravity    1.020  (1.001-1.035)  


 


Urine Protein    Trace H  (Negative)  


 


Urine Glucose (UA)    Negative  (Negative)  


 


Urine Ketones    Negative  (Negative)  


 


Urine Blood    Negative  (Negative)  


 


Urine Nitrite    Negative  (Negative)  


 


Urine Bilirubin    Negative  (Negative)  


 


Urine Urobilinogen    <2.0  (<2.0)  mg/dL


 


Ur Leukocyte Esterase    Negative  (Negative)  


 


Coronavirus (PCR)  Not Detected    (Not Detectd)  


 


Influenza Type A RNA   Detected H   (Not Detectd)  


 


Influenza Type B (PCR)   Not Detected   (Not Detectd)  














Disposition


Clinical Impression: 


 Fever, Influenza A





Disposition: HOME SELF-CARE


Condition: Stable


Instructions (If sedation given, give patient instructions):  Influenza (ED)


Additional Instructions: 


Please take the Tamiflu as directed.  Follow up with your primary care doctor 

and return for any new or worsening symptoms


Prescriptions: 


Oseltamivir [Tamiflu] 75 mg PO Q12HR #10 cap


Is patient prescribed a controlled substance at d/c from ED?: No


Referrals: 


Mumtaz Roland MD [Primary Care Provider] - 1-2 days


Time of Disposition: 23:05

## 2023-01-01 NOTE — XR
EXAMINATION TYPE: XR chest 2V

 

DATE OF EXAM: 1/1/2023

 

COMPARISON: 12/11/2022

 

HISTORY: Weakness

 

TECHNIQUE:

 

FINDINGS: Heart is enlarged. No heart failure. Lungs are clear of consolidation. There are no hilar m
asses. Costophrenic angles are clear. Bony thorax is intact.

 

IMPRESSION: Cardiomegaly. No active cardiopulmonary disease.

## 2023-01-17 ENCOUNTER — HOSPITAL ENCOUNTER (OUTPATIENT)
Dept: HOSPITAL 47 - LABWHC1 | Age: 78
Discharge: HOME | End: 2023-01-17
Attending: INTERNAL MEDICINE
Payer: MEDICARE

## 2023-01-17 DIAGNOSIS — B95.61: Primary | ICD-10-CM

## 2023-01-17 DIAGNOSIS — I50.9: ICD-10-CM

## 2023-01-17 LAB
BASOPHILS # BLD AUTO: 0.07 X 10*3/UL (ref 0–0.1)
BASOPHILS NFR BLD AUTO: 0.9 %
EOSINOPHIL # BLD AUTO: 0.09 X 10*3/UL (ref 0.04–0.35)
EOSINOPHIL NFR BLD AUTO: 1.1 %
ERYTHROCYTE [DISTWIDTH] IN BLOOD BY AUTOMATED COUNT: 3.3 X 10*6/UL (ref 4.4–5.6)
ERYTHROCYTE [DISTWIDTH] IN BLOOD: 14 % (ref 11.5–14.5)
HCT VFR BLD AUTO: 33.7 % (ref 39.6–50)
HGB BLD-MCNC: 10.7 G/DL (ref 13–17)
IMM GRANULOCYTES BLD QL AUTO: 0.4 %
LYMPHOCYTES # SPEC AUTO: 4.64 X 10*3/UL (ref 0.9–5)
LYMPHOCYTES NFR SPEC AUTO: 58.1 %
MCH RBC QN AUTO: 32.4 PG (ref 27–32)
MCHC RBC AUTO-ENTMCNC: 31.8 G/DL (ref 32–37)
MCV RBC AUTO: 102.1 FL (ref 80–97)
MONOCYTES # BLD AUTO: 0.49 X 10*3/UL (ref 0.2–1)
MONOCYTES NFR BLD AUTO: 6.1 %
NEUTROPHILS # BLD AUTO: 2.67 X 10*3/UL (ref 1.8–7.7)
NEUTROPHILS NFR BLD AUTO: 33.4 %
NRBC BLD AUTO-RTO: 0 /100 WBCS (ref 0–0)
PLATELET # BLD AUTO: 274 X 10*3/UL (ref 140–440)
WBC # BLD AUTO: 7.99 X 10*3/UL (ref 4.5–10)

## 2023-01-17 PROCEDURE — 87040 BLOOD CULTURE FOR BACTERIA: CPT

## 2023-01-17 PROCEDURE — 36415 COLL VENOUS BLD VENIPUNCTURE: CPT

## 2023-01-17 PROCEDURE — 85652 RBC SED RATE AUTOMATED: CPT

## 2023-01-17 PROCEDURE — 80048 BASIC METABOLIC PNL TOTAL CA: CPT

## 2023-01-17 PROCEDURE — 85025 COMPLETE CBC W/AUTO DIFF WBC: CPT

## 2023-01-17 PROCEDURE — 86140 C-REACTIVE PROTEIN: CPT

## 2023-01-19 LAB
ANION GAP SERPL CALC-SCNC: 14.8 MMOL/L (ref 10–18)
BUN SERPL-SCNC: 23.9 MG/DL (ref 9–27)
BUN/CREAT SERPL: 21.73 RATIO (ref 12–20)
CALCIUM SPEC-MCNC: 9.3 MG/DL (ref 8.7–10.3)
CHLORIDE SERPL-SCNC: 108 MMOL/L (ref 96–109)
CO2 SERPL-SCNC: 20.2 MMOL/L (ref 20–27.5)
GLUCOSE SERPL-MCNC: 99 MG/DL (ref 70–110)
POTASSIUM SERPL-SCNC: 4.7 MMOL/L (ref 3.5–5.5)
SODIUM SERPL-SCNC: 143 MMOL/L (ref 135–145)

## 2023-04-20 ENCOUNTER — HOSPITAL ENCOUNTER (EMERGENCY)
Dept: HOSPITAL 47 - EC | Age: 78
Discharge: OTHER INSTITUTION | End: 2023-04-20
Payer: MEDICARE

## 2023-04-20 VITALS — TEMPERATURE: 97.8 F

## 2023-04-20 VITALS — SYSTOLIC BLOOD PRESSURE: 163 MMHG | HEART RATE: 86 BPM | DIASTOLIC BLOOD PRESSURE: 86 MMHG

## 2023-04-20 VITALS — RESPIRATION RATE: 16 BRPM

## 2023-04-20 DIAGNOSIS — I10: ICD-10-CM

## 2023-04-20 DIAGNOSIS — Z88.1: ICD-10-CM

## 2023-04-20 DIAGNOSIS — Z88.8: ICD-10-CM

## 2023-04-20 DIAGNOSIS — Z87.891: ICD-10-CM

## 2023-04-20 DIAGNOSIS — K21.9: ICD-10-CM

## 2023-04-20 DIAGNOSIS — Z79.899: ICD-10-CM

## 2023-04-20 DIAGNOSIS — Z95.4: ICD-10-CM

## 2023-04-20 DIAGNOSIS — Z88.0: ICD-10-CM

## 2023-04-20 DIAGNOSIS — I25.10: ICD-10-CM

## 2023-04-20 DIAGNOSIS — Z79.82: ICD-10-CM

## 2023-04-20 DIAGNOSIS — R74.8: ICD-10-CM

## 2023-04-20 DIAGNOSIS — I16.0: Primary | ICD-10-CM

## 2023-04-20 DIAGNOSIS — Z91.048: ICD-10-CM

## 2023-04-20 LAB
ALBUMIN SERPL-MCNC: 4 G/DL (ref 3.5–5)
ALP SERPL-CCNC: 100 U/L (ref 38–126)
ALT SERPL-CCNC: 59 U/L (ref 4–49)
ANION GAP SERPL CALC-SCNC: 8 MMOL/L
APTT BLD: 23 SEC (ref 22–30)
AST SERPL-CCNC: 52 U/L (ref 17–59)
BASOPHILS # BLD AUTO: 0 K/UL (ref 0–0.2)
BASOPHILS NFR BLD AUTO: 1 %
BUN SERPL-SCNC: 18 MG/DL (ref 9–20)
CALCIUM SPEC-MCNC: 9 MG/DL (ref 8.4–10.2)
CHLORIDE SERPL-SCNC: 105 MMOL/L (ref 98–107)
CO2 SERPL-SCNC: 27 MMOL/L (ref 22–30)
EOSINOPHIL # BLD AUTO: 0.1 K/UL (ref 0–0.7)
EOSINOPHIL NFR BLD AUTO: 2 %
ERYTHROCYTE [DISTWIDTH] IN BLOOD BY AUTOMATED COUNT: 2.84 M/UL (ref 4.3–5.9)
ERYTHROCYTE [DISTWIDTH] IN BLOOD: 15.1 % (ref 11.5–15.5)
GLUCOSE SERPL-MCNC: 112 MG/DL (ref 74–99)
HCT VFR BLD AUTO: 27.1 % (ref 39–53)
HGB BLD-MCNC: 9.7 GM/DL (ref 13–17.5)
INR PPP: 1 (ref ?–1.2)
LYMPHOCYTES # SPEC AUTO: 1.6 K/UL (ref 1–4.8)
LYMPHOCYTES NFR SPEC AUTO: 30 %
MAGNESIUM SPEC-SCNC: 1.9 MG/DL (ref 1.6–2.3)
MCH RBC QN AUTO: 34.2 PG (ref 25–35)
MCHC RBC AUTO-ENTMCNC: 35.8 G/DL (ref 31–37)
MCV RBC AUTO: 95.4 FL (ref 80–100)
MONOCYTES # BLD AUTO: 0.5 K/UL (ref 0–1)
MONOCYTES NFR BLD AUTO: 8 %
NEUTROPHILS # BLD AUTO: 3.1 K/UL (ref 1.3–7.7)
NEUTROPHILS NFR BLD AUTO: 57 %
PH UR: 6 [PH] (ref 5–8)
PLATELET # BLD AUTO: 285 K/UL (ref 150–450)
POTASSIUM SERPL-SCNC: 4.5 MMOL/L (ref 3.5–5.1)
PROT SERPL-MCNC: 7 G/DL (ref 6.3–8.2)
PT BLD: 10.5 SEC (ref 9–12)
RBC UR QL: 2 /HPF (ref 0–5)
SODIUM SERPL-SCNC: 140 MMOL/L (ref 137–145)
SP GR UR: 1 (ref 1–1.03)
UROBILINOGEN UR QL STRIP: <2 MG/DL (ref ?–2)
WBC # BLD AUTO: 5.4 K/UL (ref 3.8–10.6)
WBC #/AREA URNS HPF: 1 /HPF (ref 0–5)

## 2023-04-20 PROCEDURE — 70498 CT ANGIOGRAPHY NECK: CPT

## 2023-04-20 PROCEDURE — 85025 COMPLETE CBC W/AUTO DIFF WBC: CPT

## 2023-04-20 PROCEDURE — 71046 X-RAY EXAM CHEST 2 VIEWS: CPT

## 2023-04-20 PROCEDURE — 85730 THROMBOPLASTIN TIME PARTIAL: CPT

## 2023-04-20 PROCEDURE — 80053 COMPREHEN METABOLIC PANEL: CPT

## 2023-04-20 PROCEDURE — 93005 ELECTROCARDIOGRAM TRACING: CPT

## 2023-04-20 PROCEDURE — 81001 URINALYSIS AUTO W/SCOPE: CPT

## 2023-04-20 PROCEDURE — 84484 ASSAY OF TROPONIN QUANT: CPT

## 2023-04-20 PROCEDURE — 83880 ASSAY OF NATRIURETIC PEPTIDE: CPT

## 2023-04-20 PROCEDURE — 85610 PROTHROMBIN TIME: CPT

## 2023-04-20 PROCEDURE — 70450 CT HEAD/BRAIN W/O DYE: CPT

## 2023-04-20 PROCEDURE — 36415 COLL VENOUS BLD VENIPUNCTURE: CPT

## 2023-04-20 PROCEDURE — 96374 THER/PROPH/DIAG INJ IV PUSH: CPT

## 2023-04-20 PROCEDURE — 83735 ASSAY OF MAGNESIUM: CPT

## 2023-04-20 PROCEDURE — 96375 TX/PRO/DX INJ NEW DRUG ADDON: CPT

## 2023-04-20 PROCEDURE — 70496 CT ANGIOGRAPHY HEAD: CPT

## 2023-04-20 PROCEDURE — 99285 EMERGENCY DEPT VISIT HI MDM: CPT

## 2023-04-20 NOTE — ED
Recheck HPI





- General


Chief Complaint: Recheck/Abnormal Lab/Rx


Stated Complaint: High Blood Pressure


Time Seen by Provider: 04/20/23 19:11


Source: patient, RN notes reviewed


Mode of arrival: wheelchair


Limitations: no limitations





- History of Present Illness


Initial Comments: 


This is a 77-year-old male who presents to the emergency department for concerns

of elevated blood pressure.  States that he had a TAVR at the [facility] 6 weeks ago.  This was an experimental valve.  Today, he started to 

notice that his blood pressure was elevated.  It got as high as 193/105.  States

that his blood pressure is usually in the 130s to 150s systolically.  He is 

currently taking metoprolol 25 mg twice daily and Cozaar once daily.  He spoke 

with [facility] cardiologist, who advised that if his blood pressure

continues to remain elevated, that he should come to the emergency department.  

His local cardiologist is Dr. Bonilla.  He called the office, but is still waiting

for a call back.  Denies any chest pain, however he has started to develop a 

headache today.  Also feels like when he walks he is leaning to the left.  His 

any weakness, dizziness, chest pain, or shortness of breath.





Denies any fevers, chills, sore throat, cough, dyspnea, chest pain, 

palpitations, abdominal pain, nausea, vomiting, diarrhea, or back pain.





MD Complaint: other (Elevated BP)





- Related Data


                                Home Medications











 Medication  Instructions  Recorded  Confirmed


 


Ascorbic Acid [Vitamin C] 1,000 mg PO DAILY 08/21/19 04/20/23


 


Aspirin 81 mg PO DAILY 08/21/19 04/20/23


 


Losartan [Cozaar] 50 mg PO DAILY 08/21/19 04/20/23


 


Metoprolol Tartrate [Lopressor] 25 mg PO BID 08/21/19 04/20/23


 


Nitroglycerin Sl Tabs [Nitrostat] 0.4 mg SL Q5M PRN 08/21/19 04/20/23


 


Omeprazole [PriLOSEC] 40 mg PO HS 08/21/19 04/20/23


 


Ubidecarenone [Co Q-10] 100 mg PO DAILY 08/21/19 04/20/23


 


Docusate [Colace] 100 mg PO BID 09/02/21 04/20/23


 


Albuterol Inhaler [Ventolin Hfa 1 - 2 puff INHALATION RT-QID PRN 12/07/22 04/20/23





Inhaler]   


 


Fluticasone/Vilanterol [Breo 1 puff INHALATION RT-QID PRN 12/07/22 04/20/23





Ellipta 200-25 Mcg Inhaler]   


 


Cranberry Fruit [Cranberry] 465 mg PO DAILY 12/12/22 04/20/23


 


Furosemide [Lasix] 40 mg PO DAILY PRN 12/12/22 04/20/23


 


Quercetin 800 mg PO BID 12/12/22 04/20/23


 


Zinc Gluconate [Zinc] 50 mg PO DAILY 12/12/22 04/20/23


 


Centrum Cardio 2 tab PO DAILY 12/28/22 04/20/23


 


Cholecalciferol [Vitamin D3 (125 125 mcg PO DAILY 04/20/23 04/20/23





Mcg = 5000 Iu)]   


 


Clopidogrel [Plavix] 75 mg PO DAILY 04/20/23 04/20/23


 


Dasatinib [Sprycel] 100 mg PO DAILY 04/20/23 04/20/23


 


Prosymbiotic 1 tab PO BID 04/20/23 04/20/23


 


Sennosides/Docusate Sodium 1 tab PO BID 04/20/23 04/20/23





[Senna-S 8.6-50 mg Tablet]   


 


Tamsulosin HCl [Flomax] 0.4 mg PO DAILY 04/20/23 04/20/23


 


clindamycin  mg PO TID 04/20/23 04/20/23


 


valACYclovir HCL [Valtrex] 1,000 mg PO TID 04/20/23 04/20/23








                                  Previous Rx's











 Medication  Instructions  Recorded


 


Pantoprazole [Protonix] 40 mg PO DAILY #30 tab 12/20/22











                                    Allergies











Allergy/AdvReac Type Severity Reaction Status Date / Time


 


adhesive tape Allergy  Rash/Hives,"paper Verified 04/20/23 20:20





   tape is ok"  


 


amoxicillin [From Augmentin] Allergy  Rash/Hives Verified 04/20/23 20:20


 


atorvastatin [From Lipitor] Allergy  Muscle Verified 04/20/23 20:20





   Aches  


 


budesonide [From Symbicort] Allergy  Vision Verified 04/20/23 20:20





   issues  


 


ciprofloxacin Allergy  Muscle Verified 04/20/23 20:20





   aches  


 


clavulanic acid Allergy  Rash/Hives Verified 04/20/23 20:20





[From Augmentin]     


 


doxycycline Allergy  Rectal Verified 04/20/23 20:20





   Bleeding  


 


enalapril Allergy  Cough Verified 04/20/23 20:20


 


ezetimibe [From Vytorin] Allergy  Muscle Verified 04/20/23 20:20





   Aches  


 


fluticasone Allergy  Irregular Verified 04/20/23 20:20





[From Advair Diskus]   heart rate  


 


formoterol [From Symbicort] Allergy  Vision Verified 04/20/23 20:20





   issues  


 


mold Allergy  Rash/Hives Verified 04/20/23 20:20


 


salmeterol Allergy  Irregular Verified 04/20/23 20:20





[From Advair Diskus]   heart rate  


 


simvastatin [From Vytorin] Allergy  Muscle Verified 04/20/23 20:20





   Aches  


 


Statins-HMG-CoA Reductase Allergy  Muscle Verified 04/20/23 20:20





Inhibitor   aches  





[Statins-Hmg-Coa Reductase     





Inhibitor]     


 


nilotinib [From Tasigna] AdvReac  Pancreatiti Verified 04/20/23 20:20





   s  


 


Cotton material Allergy  itching, Uncoded 04/20/23 20:20





   hives  














Review of Systems


ROS Statement: 


Those systems with pertinent positive or pertinent negative responses have been 

documented in the HPI.





ROS Other: All systems not noted in ROS Statement are negative.





Past Medical History


Past Medical History: Coronary Artery Disease (CAD), Cancer, GERD/Reflux, 

Hypertension, Skin Disorder


Additional Past Medical History / Comment(s): Hx. of CML, Hx. of Shingles L eye,

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


History of Any Multi-Drug Resistant Organisms: None Reported


Past Surgical History: Cholecystectomy, Heart Catheterization With Stent, Hernia

Repair, Orthopedic Surgery


Additional Past Surgical History / Comment(s): Thyroid surgery,Vasectomy, R 

ankle surgery, Sinus surgery, Basal cell R shoulder, Pyloroplasty & Vagotomy, 

Hemorrhoidectomy, Cataract surgery & bx. of inner thigh.aortic valve 2/24/23


Past Anesthesia/Blood Transfusion Reactions: No Reported Reaction


Date of Last Stent Placement:: 2006


Past Psychological History: No Psychological Hx Reported


Smoking Status: Former smoker


Past Alcohol Use History: None Reported


Past Drug Use History: None Reported





- Past Family History


  ** Mother


Family Medical History: Cancer, Pulmonary Embolus





  ** Father


Family Medical History: Cancer





  ** Brother(s)


Family Medical History: Cancer





  ** Son(s)


Family Medical History: Cancer





General Exam


Limitations: no limitations


General appearance: alert, in no apparent distress


Head exam: Present: atraumatic, normocephalic, normal inspection


Eye exam: Present: normal appearance, PERRL, EOMI.  Absent: scleral icterus, 

conjunctival injection, periorbital swelling


Respiratory exam: Present: normal lung sounds bilaterally.  Absent: respiratory 

distress, wheezes, rales, rhonchi, stridor


Cardiovascular Exam: Present: regular rate, normal rhythm, normal heart sounds. 

Absent: systolic murmur, diastolic murmur, rubs, gallop, clicks


Neurological exam: Present: alert, oriented X3, CN II-XII intact


  ** Expanded


Cerebellar function: Finger to Nose: Normal, Heel to Shin: Normal, Romberg: 

Normal


Upper motor neuron: Mark Neglect: Normal, Pronator Drift: Normal


Motor strength exam: RUE: 5, LUE: 5, RLE: 5, LLE: 5


Eye Response: (4) open spontaneously


Motor Response: (6) obeys commands


Verbal Response: (5) oriented


Psychiatric exam: Present: normal affect, normal mood


Skin exam: Present: warm, dry, intact, normal color.  Absent: rash





Course


                                   Vital Signs











  04/20/23 04/20/23 04/20/23





  17:48 19:27 20:00


 


Temperature 97.8 F  


 


Pulse Rate 70 65 66


 


Respiratory 20 20 20





Rate   


 


Blood Pressure 165/71 178/98 170/83


 


O2 Sat by Pulse 98 99 98





Oximetry   














  04/20/23 04/20/23 04/20/23





  21:00 22:07 22:17


 


Temperature   


 


Pulse Rate 72 73 86


 


Respiratory 20 16 16





Rate   


 


Blood Pressure 162/76 163/80 163/86


 


O2 Sat by Pulse 97 98 98





Oximetry   














Medical Decision Making





- Medical Decision Making


This is a 77-year-old male who presents to the emergency department for 

headaches and elevated blood pressure.





Was pt. sent in by a medical professional or institution?


@  -No   


Did you speak to anyone other than the patient for history?  


@  -His wife


Did you review nursing and triage notes? 


@  -I disagree with the aspect of dizziness, patient currently denies any 

dizziness.


Were old charts reviewed? 


@  -No


Differential Diagnosis? 


@  -Differential Headache:


Migraine, tension, cluster, carbon monoxide, central venous thrombosis, pension 

karma temporal arteritis, acute closure glaucoma, intercranial hemorrhage, 

mastoiditis, sinusitis, head injury, this is not meant to be an all-inclusive 

list.


EKG interpreted by me (3pts min.)?


@  -Ventricular rate 73 beats per minute, CA interval 173 ms, QRS duration 122 

ms,  ms.


X-rays interpreted by me (1pt min.)?


@  -Chest x-ray obtained, my interpretation identifies cardiomegaly and trace 

bilateral pleural effusions.


CT interpreted by me (1pt min.)?


@  -Computed tomography scan of the brain without contrast and CT angiogram of 

the head and neck obtained.  My interpretation identifies no is acute ischemic 

changes, evidence of an intracranial hemorrhage, or mass effect.


What testing was considered but not performed? (CT, X-rays, U/S, labs)? Why?


@  -None


What meds were considered but not given? Why?


@  -None


Did you discuss the management of the patient with other professionals?


@  -Yes, Dr. Valles, cardiology at [facility], who accepts the patient for 

transfer.


Did you reconcile home meds?


@  -No


Was smoking cessation discussed for >3mins.?


@  -No


Was critical care preformed (if so, how long)?


@  -No


Were there social determinants of health that impacted care today? How? 

(Homelessness, low income, unemployed, alcoholism, drug addiction, 

transportation, low edu. Level, literacy, decrease access to med. care, [facility], 

rehab)?


@  -No


Was there de-escalation of care discussed even if they declined? (Discuss DNR or

withdrawal of care, Hospice)?


@  -No


What co-morbidities impacted this encounter? (DM, HTN, Smoking, COPD, CAD, 

Cancer, CVA, Hep., AIDS, mental health diagnosis, sleep apnea, morbid obesity)?


@  -CAD, HTN


Was patient admitted / discharged?


@  -Transferred to [facility]. Lab work obtained revealing an elevated troponin of 

0.079.  Blood pressure elevated at 178/89 on arrival.  He was given 20 mg of IV 

labetalol.  His blood pressure reduced to 170/83.  He was then given a dose of 

hydralazine 10 mg, and BP improved to 163/80.  Chest x-ray reveals possible CHF 

exacerbation with cardiomegaly and trace bilateral pleural effusions.  

Cardiomegaly is stable from prior x-rays.  BNP checked and found to be negative 

for signs of CHF at a value of 826.  CTA of the head and neck and computed 

tomography scan of the brain without contrast obtained as well.  There is no 

evidence of any acute ischemic changes or intracranial hemorrhage.  While the 

troponin is only mildly elevated and this is most likely a hypertensive urgency 

situation, there is concern about admission to our facility because the patient 

recently had an experiemental TAVR done at [facility]. Patient transferred to [facility]

for further evaluation and management. Dr. Valles, cardiology, is the 

accepting provider. 


Undiagnosed new problem with uncertain prognosis?


@  -None


Drug Therapy requiring intensive monitoring for toxicity (Heparin, Nitro, 

Insulin, Cardizem)?


@  -None


Were any procedures done?


@  -None


Diagnosis/symptom?


@  -Hypertensive urgency, elevated troponin


Acute, or Chronic, or Acute on Chronic?


@  -Acute


Uncomplicated (without systemic symptoms) or Complicated (systemic symptoms)?


@  -Complicated


Side effects of treatment?


@  -None


Exacerbation, Progression, or Severe Exacerbation]


@  -Not applicable


Poses a threat to life or bodily function?


@  -Yes





This case was discussed in detail with the attending ED physician, Dr. Iglesias.

Presentation, findings, and treatment plan discussed in detail as well. 








- Lab Data


Result diagrams: 


                                 04/20/23 19:26





                                 04/20/23 19:26


                                   Lab Results











  04/20/23 04/20/23 04/20/23 Range/Units





  19:26 19:26 19:26 


 


WBC  5.4    (3.8-10.6)  k/uL


 


RBC  2.84 L    (4.30-5.90)  m/uL


 


Hgb  9.7 L    (13.0-17.5)  gm/dL


 


Hct  27.1 L    (39.0-53.0)  %


 


MCV  95.4    (80.0-100.0)  fL


 


MCH  34.2    (25.0-35.0)  pg


 


MCHC  35.8    (31.0-37.0)  g/dL


 


RDW  15.1    (11.5-15.5)  %


 


Plt Count  285    (150-450)  k/uL


 


MPV  6.8    


 


Neutrophils %  57    %


 


Lymphocytes %  30    %


 


Monocytes %  8    %


 


Eosinophils %  2    %


 


Basophils %  1    %


 


Neutrophils #  3.1    (1.3-7.7)  k/uL


 


Lymphocytes #  1.6    (1.0-4.8)  k/uL


 


Monocytes #  0.5    (0-1.0)  k/uL


 


Eosinophils #  0.1    (0-0.7)  k/uL


 


Basophils #  0.0    (0-0.2)  k/uL


 


Poikilocytosis  Slight    


 


PT   10.5   (9.0-12.0)  sec


 


INR   1.0   (<1.2)  


 


APTT   23.0   (22.0-30.0)  sec


 


Sodium     (137-145)  mmol/L


 


Potassium     (3.5-5.1)  mmol/L


 


Chloride     ()  mmol/L


 


Carbon Dioxide     (22-30)  mmol/L


 


Anion Gap     mmol/L


 


BUN     (9-20)  mg/dL


 


Creatinine     (0.66-1.25)  mg/dL


 


Est GFR (CKD-EPI)AfAm     (>60 ml/min/1.73 sqM)  


 


Est GFR (CKD-EPI)NonAf     (>60 ml/min/1.73 sqM)  


 


Glucose     (74-99)  mg/dL


 


Calcium     (8.4-10.2)  mg/dL


 


Magnesium     (1.6-2.3)  mg/dL


 


Total Bilirubin     (0.2-1.3)  mg/dL


 


AST     (17-59)  U/L


 


ALT     (4-49)  U/L


 


Alkaline Phosphatase     ()  U/L


 


Troponin I     (0.000-0.034)  ng/mL


 


NT-Pro-B Natriuret Pep     pg/mL


 


Total Protein     (6.3-8.2)  g/dL


 


Albumin     (3.5-5.0)  g/dL


 


Urine Color    Colorless  


 


Urine Appearance    Clear  (Clear)  


 


Urine pH    6.0  (5.0-8.0)  


 


Ur Specific Gravity    1.004  (1.001-1.035)  


 


Urine Protein    Trace H  (Negative)  


 


Urine Glucose (UA)    Negative  (Negative)  


 


Urine Ketones    Negative  (Negative)  


 


Urine Blood    Moderate H  (Negative)  


 


Urine Nitrite    Negative  (Negative)  


 


Urine Bilirubin    Negative  (Negative)  


 


Urine Urobilinogen    <2.0  (<2.0)  mg/dL


 


Ur Leukocyte Esterase    Negative  (Negative)  


 


Urine RBC    2  (0-5)  /hpf


 


Urine WBC    1  (0-5)  /hpf


 


Urine Bacteria    Rare H  (None)  /hpf


 


Urine Mucus    Rare H  (None)  /hpf














  04/20/23 04/20/23 04/20/23 Range/Units





  19:26 19:26 20:54 


 


WBC     (3.8-10.6)  k/uL


 


RBC     (4.30-5.90)  m/uL


 


Hgb     (13.0-17.5)  gm/dL


 


Hct     (39.0-53.0)  %


 


MCV     (80.0-100.0)  fL


 


MCH     (25.0-35.0)  pg


 


MCHC     (31.0-37.0)  g/dL


 


RDW     (11.5-15.5)  %


 


Plt Count     (150-450)  k/uL


 


MPV     


 


Neutrophils %     %


 


Lymphocytes %     %


 


Monocytes %     %


 


Eosinophils %     %


 


Basophils %     %


 


Neutrophils #     (1.3-7.7)  k/uL


 


Lymphocytes #     (1.0-4.8)  k/uL


 


Monocytes #     (0-1.0)  k/uL


 


Eosinophils #     (0-0.7)  k/uL


 


Basophils #     (0-0.2)  k/uL


 


Poikilocytosis     


 


PT     (9.0-12.0)  sec


 


INR     (<1.2)  


 


APTT     (22.0-30.0)  sec


 


Sodium  140    (137-145)  mmol/L


 


Potassium  4.5    (3.5-5.1)  mmol/L


 


Chloride  105    ()  mmol/L


 


Carbon Dioxide  27    (22-30)  mmol/L


 


Anion Gap  8    mmol/L


 


BUN  18    (9-20)  mg/dL


 


Creatinine  1.03    (0.66-1.25)  mg/dL


 


Est GFR (CKD-EPI)AfAm  81    (>60 ml/min/1.73 sqM)  


 


Est GFR (CKD-EPI)NonAf  70    (>60 ml/min/1.73 sqM)  


 


Glucose  112 H    (74-99)  mg/dL


 


Calcium  9.0    (8.4-10.2)  mg/dL


 


Magnesium  1.9    (1.6-2.3)  mg/dL


 


Total Bilirubin  0.5    (0.2-1.3)  mg/dL


 


AST  52    (17-59)  U/L


 


ALT  59 H    (4-49)  U/L


 


Alkaline Phosphatase  100    ()  U/L


 


Troponin I   0.079 H*   (0.000-0.034)  ng/mL


 


NT-Pro-B Natriuret Pep    826  pg/mL


 


Total Protein  7.0    (6.3-8.2)  g/dL


 


Albumin  4.0    (3.5-5.0)  g/dL


 


Urine Color     


 


Urine Appearance     (Clear)  


 


Urine pH     (5.0-8.0)  


 


Ur Specific Gravity     (1.001-1.035)  


 


Urine Protein     (Negative)  


 


Urine Glucose (UA)     (Negative)  


 


Urine Ketones     (Negative)  


 


Urine Blood     (Negative)  


 


Urine Nitrite     (Negative)  


 


Urine Bilirubin     (Negative)  


 


Urine Urobilinogen     (<2.0)  mg/dL


 


Ur Leukocyte Esterase     (Negative)  


 


Urine RBC     (0-5)  /hpf


 


Urine WBC     (0-5)  /hpf


 


Urine Bacteria     (None)  /hpf


 


Urine Mucus     (None)  /hpf














- Radiology Data


Radiology results: report reviewed, image reviewed





Disposition


Clinical Impression: 


 Hypertensive urgency, Elevated troponin, S/P TAVR (transcatheter aortic valve 

replacement)





Disposition: OTHER INSTITUTION NOT DEFINED


Referrals: 


Mumtaz Roland MD [Primary Care Provider] - 1-2 days





- Out of Hospital Transfer - Req. Specs


Out of Hospital Transfer - Requested Specifics: Other Emergency Center ([facility])

## 2023-04-20 NOTE — XR
EXAMINATION TYPE: XR chest 2V

 

DATE OF EXAM: 4/20/2023

 

COMPARISON: Chest x-ray January 1, 2023

 

HISTORY: Chest pain

 

TECHNIQUE:  Frontal and lateral views of the chest are obtained.

 

FINDINGS:  Cardiomegaly is redemonstrated. New tiny bilateral pleural effusions with blunting of post
erior costophrenic angles on lateral view.   New metallic stent graft in the aortic root seen best on
 lateral view. The osseous structures are intact. Surgical clips in the epigastric region are again s
een.

 

IMPRESSION:  Possible CHF exacerbation as detailed above. Correlate clinically..

## 2023-04-20 NOTE — CT
EXAMINATION TYPE: CT brain wo con

 

DATE OF EXAM: 4/20/2023

 

HISTORY: Headache and ataxia

 

CT DLP: 1206.6 mGycm.  Automated Exposure Control for Dose Reduction was Utilized.

 

TECHNIQUE: CT scan of the head is performed without contrast.

 

COMPARISON: None.

 

FINDINGS:   There is no acute intracranial hemorrhage or midline shift identified. There is mild diff
use ventricular and sulcal prominence consistent with diffuse age-related cerebral atrophy.  There is
 mild to moderate low-attenuation in the periventricular white matter consistent with chronic small v
essel ischemic change. Left-sided aphakia. Nasal septum deviated to right of midline. Visualized para
nasal sinuses are clear. Dominant right vertebral artery incidentally noted.

 

IMPRESSION:  No acute intracranial hemorrhage or midline shift.  There is mild diffuse age-related ce
rebral atrophy and mild to moderate chronic small vessel ischemic change noted.

## 2023-04-20 NOTE — CT
EXAMINATION TYPE: CT angio head neck

 

DATE OF EXAM: 4/20/2023

 

HISTORY: Headache and ataxia

 

COMPARISON: None.

 

CT DLP: 641 mGycm.  Automated Exposure Control for Dose Reduction was Utilized.

 

TECHNIQUE:  CTA scan of the head and neck is performed with IV Contrast, patient injected with 65 mL 
of Isovue 370, axial images are obtained, coronal and sagittal reformatted images are reviewed. 3D re
constructed images are created on an independent workstation and reviewed.

 

FINDINGS:

 

Carotid/Vascular Structures: Normal three-vessel origin from the aortic arch without significant plaq
ue or stenosis. No significant plaque or stenosis in the common carotid arteries bilaterally. Mild ca
lcified plaque in the proximal right internal carotid artery without significant stenosis. Mild calci
fied plaque distal right internal carotid artery. No significant plaque or stenosis in the remainder 
of the internal carotid arteries bilaterally. Patent bilateral external carotid arteries without sign
ificant stenosis. 

 

Vertebral arteries are patent to the basilar artery. Right-sided vertebral artery is dominant or larg
er caliber. There are patent bilateral posterior communicating arteries. There is no significant foca
l stenosis or aneurysm in the posterior circulation. Images of the anterior circulation show patent a
nterior communicating artery. There is no significant focal stenosis or aneurysm.

 

Other: Probable pericardial effusion is partially imaged. Moderate to severe spurring and disc space 
narrowing C5-C6 and C6-C7 levels is present.

 

IMPRESSION:  No significant stenosis in common or internal carotid arteries bilaterally. No significa
nt stenosis or aneurysm at the level of the [place].

 

NASCET criteria was used in interpretation of this exam?

## 2023-06-27 ENCOUNTER — HOSPITAL ENCOUNTER (OUTPATIENT)
Dept: HOSPITAL 47 - EC | Age: 78
Setting detail: OBSERVATION
LOS: 3 days | Discharge: HOME | End: 2023-06-30
Attending: INTERNAL MEDICINE | Admitting: INTERNAL MEDICINE
Payer: MEDICARE

## 2023-06-27 DIAGNOSIS — Z95.5: ICD-10-CM

## 2023-06-27 DIAGNOSIS — I25.10: ICD-10-CM

## 2023-06-27 DIAGNOSIS — Z79.899: ICD-10-CM

## 2023-06-27 DIAGNOSIS — N30.90: Primary | ICD-10-CM

## 2023-06-27 DIAGNOSIS — Z88.0: ICD-10-CM

## 2023-06-27 DIAGNOSIS — Z87.891: ICD-10-CM

## 2023-06-27 DIAGNOSIS — D64.9: ICD-10-CM

## 2023-06-27 DIAGNOSIS — Z95.2: ICD-10-CM

## 2023-06-27 DIAGNOSIS — K21.9: ICD-10-CM

## 2023-06-27 DIAGNOSIS — Z87.440: ICD-10-CM

## 2023-06-27 DIAGNOSIS — Z88.1: ICD-10-CM

## 2023-06-27 DIAGNOSIS — C92.10: ICD-10-CM

## 2023-06-27 DIAGNOSIS — N45.3: ICD-10-CM

## 2023-06-27 DIAGNOSIS — Z79.82: ICD-10-CM

## 2023-06-27 DIAGNOSIS — I10: ICD-10-CM

## 2023-06-27 DIAGNOSIS — D84.9: ICD-10-CM

## 2023-06-27 DIAGNOSIS — Z87.442: ICD-10-CM

## 2023-06-27 DIAGNOSIS — Z79.02: ICD-10-CM

## 2023-06-27 DIAGNOSIS — E78.5: ICD-10-CM

## 2023-06-27 LAB
ALBUMIN SERPL-MCNC: 3.6 G/DL (ref 3.5–5)
ALP SERPL-CCNC: 71 U/L (ref 38–126)
ALT SERPL-CCNC: 20 U/L (ref 4–49)
AMYLASE SERPL-CCNC: 46 U/L (ref 30–110)
ANION GAP SERPL CALC-SCNC: 8 MMOL/L
AST SERPL-CCNC: 25 U/L (ref 17–59)
BASOPHILS # BLD AUTO: 0 K/UL (ref 0–0.2)
BASOPHILS NFR BLD AUTO: 1 %
BUN SERPL-SCNC: 30 MG/DL (ref 9–20)
CALCIUM SPEC-MCNC: 8.5 MG/DL (ref 8.4–10.2)
CHLORIDE SERPL-SCNC: 111 MMOL/L (ref 98–107)
CO2 SERPL-SCNC: 19 MMOL/L (ref 22–30)
EOSINOPHIL # BLD AUTO: 0.1 K/UL (ref 0–0.7)
EOSINOPHIL NFR BLD AUTO: 1 %
ERYTHROCYTE [DISTWIDTH] IN BLOOD BY AUTOMATED COUNT: 2.82 M/UL (ref 4.3–5.9)
ERYTHROCYTE [DISTWIDTH] IN BLOOD: 15 % (ref 11.5–15.5)
GLUCOSE SERPL-MCNC: 114 MG/DL (ref 74–99)
HCT VFR BLD AUTO: 28 % (ref 39–53)
HGB BLD-MCNC: 9.7 GM/DL (ref 13–17.5)
LIPASE SERPL-CCNC: 36 U/L (ref 23–300)
LYMPHOCYTES # SPEC AUTO: 2.4 K/UL (ref 1–4.8)
LYMPHOCYTES NFR SPEC AUTO: 26 %
MCH RBC QN AUTO: 34.2 PG (ref 25–35)
MCHC RBC AUTO-ENTMCNC: 34.4 G/DL (ref 31–37)
MCV RBC AUTO: 99.3 FL (ref 80–100)
MONOCYTES # BLD AUTO: 0.6 K/UL (ref 0–1)
MONOCYTES NFR BLD AUTO: 7 %
NEUTROPHILS # BLD AUTO: 5.8 K/UL (ref 1.3–7.7)
NEUTROPHILS NFR BLD AUTO: 64 %
PLATELET # BLD AUTO: 201 K/UL (ref 150–450)
POTASSIUM SERPL-SCNC: 4.3 MMOL/L (ref 3.5–5.1)
PROT SERPL-MCNC: 6.4 G/DL (ref 6.3–8.2)
SODIUM SERPL-SCNC: 138 MMOL/L (ref 137–145)
WBC # BLD AUTO: 9 K/UL (ref 3.8–10.6)

## 2023-06-27 PROCEDURE — 96375 TX/PRO/DX INJ NEW DRUG ADDON: CPT

## 2023-06-27 PROCEDURE — 76870 US EXAM SCROTUM: CPT

## 2023-06-27 PROCEDURE — 96365 THER/PROPH/DIAG IV INF INIT: CPT

## 2023-06-27 PROCEDURE — 99285 EMERGENCY DEPT VISIT HI MDM: CPT

## 2023-06-27 PROCEDURE — 93975 VASCULAR STUDY: CPT

## 2023-06-27 PROCEDURE — 96374 THER/PROPH/DIAG INJ IV PUSH: CPT

## 2023-06-27 PROCEDURE — 83605 ASSAY OF LACTIC ACID: CPT

## 2023-06-27 PROCEDURE — 87077 CULTURE AEROBIC IDENTIFY: CPT

## 2023-06-27 PROCEDURE — 87040 BLOOD CULTURE FOR BACTERIA: CPT

## 2023-06-27 PROCEDURE — 82150 ASSAY OF AMYLASE: CPT

## 2023-06-27 PROCEDURE — 81001 URINALYSIS AUTO W/SCOPE: CPT

## 2023-06-27 PROCEDURE — 74176 CT ABD & PELVIS W/O CONTRAST: CPT

## 2023-06-27 PROCEDURE — 96361 HYDRATE IV INFUSION ADD-ON: CPT

## 2023-06-27 PROCEDURE — 87086 URINE CULTURE/COLONY COUNT: CPT

## 2023-06-27 PROCEDURE — 85025 COMPLETE CBC W/AUTO DIFF WBC: CPT

## 2023-06-27 PROCEDURE — 96376 TX/PRO/DX INJ SAME DRUG ADON: CPT

## 2023-06-27 PROCEDURE — 83690 ASSAY OF LIPASE: CPT

## 2023-06-27 PROCEDURE — 87186 SC STD MICRODIL/AGAR DIL: CPT

## 2023-06-27 PROCEDURE — 80053 COMPREHEN METABOLIC PANEL: CPT

## 2023-06-27 PROCEDURE — 36415 COLL VENOUS BLD VENIPUNCTURE: CPT

## 2023-06-27 PROCEDURE — 96366 THER/PROPH/DIAG IV INF ADDON: CPT

## 2023-06-27 NOTE — ED
Back Pain HPI





- General


Chief Complaint: Back Pain/Injury


Stated Complaint: back pain


Time Seen by Provider: 23 22:34


Source: patient, EMS, RN notes reviewed


Mode of arrival: EMS


Limitations: physical limitation





- History of Present Illness


Initial Comments: 


This is a 77-year-old male who presents to the emergency department for right 

flank pain.  States that this started 2-3 days ago.  This is wrapping around 

into the abdomen.  Denies any nausea or vomiting.  Also denies any injuries.  

Reports a history of kidney stones many years ago, and states that it feels 

similar.  Denies any loss of bowel/bladder control.  He does self cath twice a 

day however and denies any urinary symptoms or changes. He follows with Dr. Oliva, urology, and states that a cystoscopy is planned in August of this year.





Denies any fevers, chills, sore throat, cough, dyspnea, chest pain, 

palpitations, nausea, vomiting, diarrhea, or headaches.





MD Complaint: back pain


Onset/Timin


-: days(s)





- Related Data


                                Home Medications











 Medication  Instructions  Recorded  Confirmed


 


Ascorbic Acid [Vitamin C] 1,000 mg PO DAILY 19


 


Aspirin 81 mg PO DAILY 19


 


Losartan [Cozaar] 50 mg PO DAILY 19


 


Metoprolol Tartrate [Lopressor] 25 mg PO BID 19


 


Nitroglycerin Sl Tabs [Nitrostat] 0.4 mg SL Q5M PRN 19


 


Omeprazole [PriLOSEC] 40 mg PO HS 19


 


Ubidecarenone [Co Q-10] 100 mg PO DAILY 19


 


Docusate [Colace] 100 mg PO BID 21


 


Albuterol Inhaler [Ventolin Hfa 1 - 2 puff INHALATION RT-QID PRN 22





Inhaler]   


 


Fluticasone/Vilanterol [Breo 1 puff INHALATION RT-QID PRN 22





Ellipta 200-25 Mcg Inhaler]   


 


Cranberry Fruit [Cranberry] 465 mg PO DAILY 22


 


Furosemide [Lasix] 40 mg PO DAILY PRN 22


 


Quercetin 800 mg PO BID 22


 


Zinc Gluconate [Zinc] 50 mg PO DAILY 22


 


Centrum Cardio 2 tab PO DAILY 22


 


Cholecalciferol [Vitamin D3 (125 125 mcg PO DAILY 23





Mcg = 5000 Iu)]   


 


Clopidogrel [Plavix] 75 mg PO DAILY 23


 


Dasatinib [Sprycel] 100 mg PO DAILY 23


 


Prosymbiotic 1 tab PO BID 23


 


Sennosides/Docusate Sodium 1 tab PO BID 23





[Senna-S 8.6-50 mg Tablet]   


 


Tamsulosin HCl [Flomax] 0.4 mg PO DAILY 23


 


clindamycin  mg PO TID 23


 


valACYclovir HCL [Valtrex] 1,000 mg PO TID 23








                                  Previous Rx's











 Medication  Instructions  Recorded


 


Pantoprazole [Protonix] 40 mg PO DAILY #30 tab 22











                                    Allergies











Allergy/AdvReac Type Severity Reaction Status Date / Time


 


adhesive tape Allergy  Rash/Hives,"paper Verified 23 22:31





   tape is ok"  


 


amoxicillin [From Augmentin] Allergy  Rash/Hives Verified 23 22:31


 


atorvastatin [From Lipitor] Allergy  Muscle Verified 23 22:31





   Aches  


 


budesonide [From Symbicort] Allergy  Vision Verified 23 22:31





   issues  


 


ciprofloxacin Allergy  Muscle Verified 23 22:31





   aches  


 


clavulanic acid Allergy  Rash/Hives Verified 23 22:31





[From Augmentin]     


 


doxycycline Allergy  Rectal Verified 23 22:31





   Bleeding  


 


enalapril Allergy  Cough Verified 23 22:31


 


ezetimibe [From Vytorin] Allergy  Muscle Verified 23 22:31





   Aches  


 


fluticasone Allergy  Irregular Verified 23 22:31





[From Advair Diskus]   heart rate  


 


formoterol [From Symbicort] Allergy  Vision Verified 23 22:31





   issues  


 


mold Allergy  Rash/Hives Verified 23 22:31


 


salmeterol Allergy  Irregular Verified 23 22:31





[From Advair Diskus]   heart rate  


 


simvastatin [From Vytorin] Allergy  Muscle Verified 23 22:31





   Aches  


 


Statins-HMG-CoA Reductase Allergy  Muscle Verified 23 22:31





Inhibitor   aches  





[Statins-Hmg-Coa Reductase     





Inhibitor]     


 


nilotinib [From Tasigna] AdvReac  Pancreatiti Verified 23 22:31





   s  


 


Cotton material Allergy  itching, Uncoded 23 20:20





   hives  














Review of Systems


ROS Statement: 


Those systems with pertinent positive or pertinent negative responses have been 

documented in the HPI.





ROS Other: All systems not noted in ROS Statement are negative.





Past Medical History


Past Medical History: Coronary Artery Disease (CAD), Cancer, GERD/Reflux, 

Hypertension, Skin Disorder


Additional Past Medical History / Comment(s): Hx. of CML, Hx. of Shingles L eye,

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


History of Any Multi-Drug Resistant Organisms: None Reported


Past Surgical History: Cholecystectomy, Heart Catheterization With Stent, Hernia

Repair, Orthopedic Surgery


Additional Past Surgical History / Comment(s): Thyroid surgery,Vasectomy, R 

ankle surgery, Sinus surgery, Basal cell R shoulder, Pyloroplasty & Vagotomy, 

Hemorrhoidectomy, Cataract surgery & bx. of inner thigh.aortic valve 23


Past Anesthesia/Blood Transfusion Reactions: No Reported Reaction


Date of Last Stent Placement:: 


Past Psychological History: No Psychological Hx Reported


Smoking Status: Former smoker


Past Alcohol Use History: None Reported


Past Drug Use History: None Reported





- Past Family History


  ** Mother


Family Medical History: Cancer, Pulmonary Embolus





  ** Father


Family Medical History: Cancer





  ** Brother(s)


Family Medical History: Cancer





  ** Son(s)


Family Medical History: Cancer





General Exam


Limitations: physical limitation


General appearance: alert, in distress


Head exam: Present: atraumatic, normocephalic, normal inspection


Respiratory exam: Present: normal lung sounds bilaterally.  Absent: respiratory 

distress, wheezes, rales, rhonchi, stridor


Cardiovascular Exam: Present: regular rate, normal rhythm, normal heart sounds. 

Absent: systolic murmur, diastolic murmur, rubs, gallop, clicks


GI/Abdominal exam: Present: soft, tenderness (Right mid to lower abdomen).  

Absent: distended


Back exam: Present: CVA tenderness (R).  Absent: CVA tenderness (L)


Neurological exam: Present: alert, oriented X3, CN II-XII intact


Psychiatric exam: Present: normal affect, normal mood


Skin exam: Present: warm, dry, intact, normal color.  Absent: rash





Course


                                   Vital Signs











  23





  22:28 03:00 03:49


 


Temperature 97.0 F L 98.0 F 98.0 F


 


Pulse Rate 86 91 91


 


Respiratory 18 20 20





Rate   


 


Blood Pressure 168/79 149/73 149/73


 


O2 Sat by Pulse 96 98 98





Oximetry   














Medical Decision Making





- Medical Decision Making


This is a 77-year-old male who presents to the emergency department for right 

flank pain.





Was pt. sent in by a medical professional or institution?


@  -No   


Did you speak to anyone other than the patient for history?  


@  -No


Did you review nursing and triage notes? 


@  -Yes, and I agree, it is accurate with regards to the patient's symptoms.


Were old charts reviewed? 


@  -No


Differential Diagnosis? 


@  -Differential Back Pain:


Strain, zoster, cauda equina syndrome, epidural abscess, vertebral o

steomyelitis, discitis, fracture, subluxation, disc herniation, DJD, spinal 

stenosis, dissection, AAA, pancreatitis, peptic ulcer disease, pyelonephritis, 

kidney stone, this is not meant to be an all-inclusive list.


EKG interpreted by me (3pts min.)?


@  -Not obtained


X-rays interpreted by me (1pt min.)?


@  -Not obtained


CT interpreted by me (1pt min.)?


@  -Computed tomography scan of the abdomen and pelvis obtained.  My 

interpretation identifies no evidence of a ureteral calculus.


U/S interpreted by me (1pt. min.)?


@  -Not obtained


What testing was considered but not performed? (CT, X-rays, U/S, labs)? Why?


@  -None


What meds were considered but not given? Why?


@  -None


Did you discuss the management of the patient with other professionals?


@  -Yes, Dr. Storm, who accepts the patient for admission.


Did you reconcile home meds?


@  -No


Was smoking cessation discussed for >3mins.?


@  -No


Was critical care preformed (if so, how long)?


@  -No


Were there social determinants of health that impacted care today? How? 

(Homelessness, low income, unemployed, alcoholism, drug addiction, 

transportation, low edu. Level, literacy, decrease access to med. care, detention, 

rehab)?


@  -No


Was there de-escalation of care discussed even if they declined? (Discuss DNR or

withdrawal of care, Hospice)?


@  -No


What co-morbidities impacted this encounter? (DM, HTN, Smoking, COPD, CAD, 

Cancer, CVA, Hep., AIDS, mental health diagnosis, sleep apnea, morbid obesity)?


@  -CAD, CML


Was patient admitted / discharged?


@  -Admitted. Lab work obtained and found to be nonactionable.  Urinalysis 

consistent with infection.  CT scan obtained revealing no evidence of a ureteral

calculus or other acute process.  His flank pain was very difficult to control 

and required multiple doses of pain medication. Unclear if this is related to 

the infection or also a possible musculoskeletal process.  While there is no 

evidence of leukocytosis and the patient is afebrile, the patient has been 

admitted for infections and found to be bacteremic, even in the absence of a 

white count and fever.  Given the level of patient's pain and the fact that he 

is immunocompromised on oral chemotherapy, patient admitted to medicine for 

further management of the UTI and pain control. He was given a dose of ceftria

xone with blood and urine cultures obtained prior.


Undiagnosed new problem with uncertain prognosis?


@  -None


Drug Therapy requiring intensive monitoring for toxicity (Heparin, Nitro, 

Insulin, Cardizem)?


@  -None


Were any procedures done?


@  -None


Diagnosis/symptom?


@  -UTI, right flank pain


Acute, or Chronic, or Acute on Chronic?


@  -Acute


Uncomplicated (without systemic symptoms) or Complicated (systemic symptoms)?


@  -Complicated


Side effects of treatment?


@  -None


Exacerbation, Progression, or Severe Exacerbation]


@  -Not applicable


Poses a threat to life or bodily function?


@  -Yes





This case was discussed in detail with the attending ED physician, Dr. Solis. 

Presentation, findings, and treatment plan discussed in detail as well. 








- Lab Data


Result diagrams: 


                                 23 22:52





                                 23 22:52


                                   Lab Results











  2323 Range/Units





  01:04 22:52 22:52 


 


WBC   9.0   (3.8-10.6)  k/uL


 


RBC   2.82 L   (4.30-5.90)  m/uL


 


Hgb   9.7 L   (13.0-17.5)  gm/dL


 


Hct   28.0 L   (39.0-53.0)  %


 


MCV   99.3   (80.0-100.0)  fL


 


MCH   34.2   (25.0-35.0)  pg


 


MCHC   34.4   (31.0-37.0)  g/dL


 


RDW   15.0   (11.5-15.5)  %


 


Plt Count   201   (150-450)  k/uL


 


MPV   7.9   


 


Neutrophils %   64   %


 


Lymphocytes %   26   %


 


Monocytes %   7   %


 


Eosinophils %   1   %


 


Basophils %   1   %


 


Neutrophils #   5.8   (1.3-7.7)  k/uL


 


Lymphocytes #   2.4   (1.0-4.8)  k/uL


 


Monocytes #   0.6   (0-1.0)  k/uL


 


Eosinophils #   0.1   (0-0.7)  k/uL


 


Basophils #   0.0   (0-0.2)  k/uL


 


Macrocytosis   Slight   


 


Sodium    138  (137-145)  mmol/L


 


Potassium    4.3  (3.5-5.1)  mmol/L


 


Chloride    111 H  ()  mmol/L


 


Carbon Dioxide    19 L  (22-30)  mmol/L


 


Anion Gap    8  mmol/L


 


BUN    30 H  (9-20)  mg/dL


 


Creatinine    0.82  (0.66-1.25)  mg/dL


 


Est GFR (CKD-EPI)AfAm    >90  (>60 ml/min/1.73 sqM)  


 


Est GFR (CKD-EPI)NonAf    85  (>60 ml/min/1.73 sqM)  


 


Glucose    114 H  (74-99)  mg/dL


 


Plasma Lactic Acid Dave     (0.7-2.0)  mmol/L


 


Calcium    8.5  (8.4-10.2)  mg/dL


 


Total Bilirubin    0.6  (0.2-1.3)  mg/dL


 


AST    25  (17-59)  U/L


 


ALT    20  (4-49)  U/L


 


Alkaline Phosphatase    71  ()  U/L


 


Total Protein    6.4  (6.3-8.2)  g/dL


 


Albumin    3.6  (3.5-5.0)  g/dL


 


Amylase    46  ()  U/L


 


Lipase    36  ()  U/L


 


Urine Color  Yellow    


 


Urine Appearance  Clear    (Clear)  


 


Urine pH  5.5    (5.0-8.0)  


 


Ur Specific Gravity  1.022    (1.001-1.035)  


 


Urine Protein  Trace H    (Negative)  


 


Urine Glucose (UA)  Negative    (Negative)  


 


Urine Ketones  1+ H    (Negative)  


 


Urine Blood  Negative    (Negative)  


 


Urine Nitrite  Positive    (Negative)  


 


Urine Bilirubin  Negative    (Negative)  


 


Urine Urobilinogen  <2.0    (<2.0)  mg/dL


 


Ur Leukocyte Esterase  Large H    (Negative)  


 


Urine RBC  5    (0-5)  /hpf


 


Urine WBC  50 H    (0-5)  /hpf


 


Ur Squamous Epith Cells  2    (0-4)  /hpf


 


Amorphous Sediment  Occasional H    (None)  /hpf


 


Urine Bacteria  Many H    (None)  /hpf


 


Hyaline Casts  1    (0-2)  /lpf


 


Urine Mucus  Occasional H    (None)  /hpf














  23 Range/Units





  22:52 


 


WBC   (3.8-10.6)  k/uL


 


RBC   (4.30-5.90)  m/uL


 


Hgb   (13.0-17.5)  gm/dL


 


Hct   (39.0-53.0)  %


 


MCV   (80.0-100.0)  fL


 


MCH   (25.0-35.0)  pg


 


MCHC   (31.0-37.0)  g/dL


 


RDW   (11.5-15.5)  %


 


Plt Count   (150-450)  k/uL


 


MPV   


 


Neutrophils %   %


 


Lymphocytes %   %


 


Monocytes %   %


 


Eosinophils %   %


 


Basophils %   %


 


Neutrophils #   (1.3-7.7)  k/uL


 


Lymphocytes #   (1.0-4.8)  k/uL


 


Monocytes #   (0-1.0)  k/uL


 


Eosinophils #   (0-0.7)  k/uL


 


Basophils #   (0-0.2)  k/uL


 


Macrocytosis   


 


Sodium   (137-145)  mmol/L


 


Potassium   (3.5-5.1)  mmol/L


 


Chloride   ()  mmol/L


 


Carbon Dioxide   (22-30)  mmol/L


 


Anion Gap   mmol/L


 


BUN   (9-20)  mg/dL


 


Creatinine   (0.66-1.25)  mg/dL


 


Est GFR (CKD-EPI)AfAm   (>60 ml/min/1.73 sqM)  


 


Est GFR (CKD-EPI)NonAf   (>60 ml/min/1.73 sqM)  


 


Glucose   (74-99)  mg/dL


 


Plasma Lactic Acid Dave  0.6 L  (0.7-2.0)  mmol/L


 


Calcium   (8.4-10.2)  mg/dL


 


Total Bilirubin   (0.2-1.3)  mg/dL


 


AST   (17-59)  U/L


 


ALT   (4-49)  U/L


 


Alkaline Phosphatase   ()  U/L


 


Total Protein   (6.3-8.2)  g/dL


 


Albumin   (3.5-5.0)  g/dL


 


Amylase   ()  U/L


 


Lipase   ()  U/L


 


Urine Color   


 


Urine Appearance   (Clear)  


 


Urine pH   (5.0-8.0)  


 


Ur Specific Gravity   (1.001-1.035)  


 


Urine Protein   (Negative)  


 


Urine Glucose (UA)   (Negative)  


 


Urine Ketones   (Negative)  


 


Urine Blood   (Negative)  


 


Urine Nitrite   (Negative)  


 


Urine Bilirubin   (Negative)  


 


Urine Urobilinogen   (<2.0)  mg/dL


 


Ur Leukocyte Esterase   (Negative)  


 


Urine RBC   (0-5)  /hpf


 


Urine WBC   (0-5)  /hpf


 


Ur Squamous Epith Cells   (0-4)  /hpf


 


Amorphous Sediment   (None)  /hpf


 


Urine Bacteria   (None)  /hpf


 


Hyaline Casts   (0-2)  /lpf


 


Urine Mucus   (None)  /hpf














- Radiology Data


Radiology results: report reviewed, image reviewed





Disposition


Clinical Impression: 


 UTI (urinary tract infection), Right flank pain, Immunocompromised state





Disposition: ADMITTED AS IP TO THIS HOSP
[id number]

## 2023-06-27 NOTE — CT
EXAMINATION TYPE: CT abdomen pelvis wo con

 

DATE OF EXAM: 6/27/2023

 

COMPARISON: 12/11/2022

 

HISTORY: BACK PAIN WORSENING OVER THE LAST SEVERAL DAYS

 

CT DLP: 770.6 mGycm

 

Examination of the solid and hollow viscera is limited given the lack of contrast.

 

FINDINGS: 

 

LUNG BASES: No evidence for nodule. No evidence for infiltrate. Cardiomegaly with pericardial effusio
n measuring 5.4 mm.

 

LIVER/GB: Gallbladder surgically absent. No space-occupying hepatic lesion.

 

PANCREAS: No pancreatic mass identified. No inflammatory process seen.

 

SPLEEN: No evidence for splenomegaly. No intrasplenic lesions seen.

 

ADRENALS: No adrenal nodules identified. No evidence for thickening.

 

KIDNEYS: Cystic lesion lower pole left kidney measures 8.3 cm. Additional hypoattenuating lesion mid 
pole right kidney measures 1.8 cm. No evidence for nephrolithiasis. No evidence for hydronephrosis. U
rinary bladder wall thickening may reflect cystitis.

 

BOWEL: Nonvisualization of the appendix. No evidence of bowel obstruction. No inflammatory process. M
oderate fecal stasis right hemicolon. Moderate sigmoid diverticulosis without diverticulitis.

 

Lymph nodes: No evidence for adenopathy greater than 1 cm.

 

Abdominal aorta: Atheromatous changes seen. No evidence for aneurysm.

 

Genital organs: No significant abnormality.

 

Other: No significant abnormality.

 

IMPRESSION: 

1.Urinary bladder wall thickening may reflect cystitis.

2. Cardiomegaly with pericardial effusion.

3. Renal cystic changes.

4. Nonvisualization of the appendix.

## 2023-06-28 LAB
HYALINE CASTS UR QL AUTO: 1 /LPF (ref 0–2)
KETONES UR QL STRIP.AUTO: (no result)
PH UR: 5.5 [PH] (ref 5–8)
RBC UR QL: 5 /HPF (ref 0–5)
SP GR UR: 1.02 (ref 1–1.03)
SQUAMOUS UR QL AUTO: 2 /HPF (ref 0–4)
UROBILINOGEN UR QL STRIP: <2 MG/DL (ref ?–2)
WBC # UR AUTO: 50 /HPF (ref 0–5)

## 2023-06-28 RX ADMIN — CEFAZOLIN SCH MLS/HR: 330 INJECTION, POWDER, FOR SOLUTION INTRAMUSCULAR; INTRAVENOUS at 09:32

## 2023-06-28 RX ADMIN — CEFAZOLIN SCH MLS/HR: 330 INJECTION, POWDER, FOR SOLUTION INTRAMUSCULAR; INTRAVENOUS at 03:09

## 2023-06-28 RX ADMIN — HYDROMORPHONE HYDROCHLORIDE PRN MG: 1 INJECTION, SOLUTION INTRAMUSCULAR; INTRAVENOUS; SUBCUTANEOUS at 17:31

## 2023-06-28 RX ADMIN — PANTOPRAZOLE SODIUM SCH MG: 40 TABLET, DELAYED RELEASE ORAL at 20:07

## 2023-06-28 RX ADMIN — CEFAZOLIN SCH MLS/HR: 330 INJECTION, POWDER, FOR SOLUTION INTRAMUSCULAR; INTRAVENOUS at 23:04

## 2023-06-28 RX ADMIN — ENOXAPARIN SODIUM SCH: 40 INJECTION SUBCUTANEOUS at 09:32

## 2023-06-28 RX ADMIN — HYDROMORPHONE HYDROCHLORIDE PRN MG: 1 INJECTION, SOLUTION INTRAMUSCULAR; INTRAVENOUS; SUBCUTANEOUS at 09:29

## 2023-06-28 RX ADMIN — HYDROMORPHONE HYDROCHLORIDE PRN MG: 1 INJECTION, SOLUTION INTRAMUSCULAR; INTRAVENOUS; SUBCUTANEOUS at 04:28

## 2023-06-28 RX ADMIN — CLOPIDOGREL BISULFATE SCH MG: 75 TABLET ORAL at 09:32

## 2023-06-28 RX ADMIN — LOSARTAN POTASSIUM SCH MG: 50 TABLET, FILM COATED ORAL at 09:32

## 2023-06-28 RX ADMIN — TAMSULOSIN HYDROCHLORIDE SCH MG: 0.4 CAPSULE ORAL at 09:32

## 2023-06-28 RX ADMIN — ASPIRIN 81 MG CHEWABLE TABLET SCH MG: 81 TABLET CHEWABLE at 09:32

## 2023-06-28 RX ADMIN — CEFAZOLIN SCH MLS/HR: 330 INJECTION, POWDER, FOR SOLUTION INTRAMUSCULAR; INTRAVENOUS at 17:32

## 2023-06-28 RX ADMIN — HYDROMORPHONE HYDROCHLORIDE PRN MG: 1 INJECTION, SOLUTION INTRAMUSCULAR; INTRAVENOUS; SUBCUTANEOUS at 20:57

## 2023-06-28 NOTE — P.HPIM
History of Present Illness


H&P Date: 06/28/23


Chief Complaint: flank pain 





78 year old male with h/o CML, hypertension , CAD s/p stents





coming in today with 2-3 days history of flank pain , severe , improves with 

rest, 10/10 in severity off/on , and got worse today , denies fever, chills, 

denies nausea or vomiting, denies diarrhea or changes in urinary habits, denies 

any bleeding. he does have difficulties emptying his bladder , and does self 

cath couple times a day. he noticed no changes in his urinary habits. 








review of systems


Pertinent positives as noted in HPI. All other systems were reviewed and are 

negative








on exam 


Constitutional:          No acute distress, conversant, pleasant


Eyes:      Anicteric sclerae, moist conjunctiva, 


         Pupils equal round reactive to light





ENMT:      NC/AT


         Oropharynx clear, no erythema, or exudates





Neck:      Supple,  no masses, or JVD


         No carotid bruits


         No thyromegaly





Lungs:      Clear to auscultation


         Clear to percussion


         Normal respiratory effort, no accessory muscle use 





Cardiovascular:      Heart regular in rate and rhythm, 


         No murmurs, gallops, or rubs


         No peripheral edema





Abdominal:       Soft


         Nontender, no guarding, rebound or rigidity


         Abdomen moving with respiration


         Normoactive bowel sounds


         No hepatomegaly, No splenomegaly


         No palpable mass 


         No abdominal wall hernia noted 





Skin:      Normal temperature, tone, texture, turgor


          





Extremities:      No digital cyanosis 


         No clubbing


         Pedal pulses intact and symmetrical


         Radial pulses intact and symmetrical 


         No calf tenderness 





Psychiatric:      Alert and oriented to person, place and time


          


      


Neuro      Muscles Strength 4/5 in all 4 extremities 


         Sensation to light touch grossly present throughout


         Cranial nerves II-XII grossly intact 


Lymphatics:       no palpable cervical or supraclavicular   lymph nodes  








Past Medical History


Past Medical History: Coronary Artery Disease (CAD), Cancer, GERD/Reflux, 

Hypertension, Skin Disorder


Additional Past Medical History / Comment(s): Hx. of CML, Hx. of Shingles L eye,

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


History of Any Multi-Drug Resistant Organisms: None Reported


Past Surgical History: Cholecystectomy, Heart Catheterization With Stent, Hernia

Repair, Orthopedic Surgery


Additional Past Surgical History / Comment(s): Thyroid surgery,Vasectomy, R 

ankle surgery, Sinus surgery, Basal cell R shoulder, Pyloroplasty & Vagotomy, 

Hemorrhoidectomy, Cataract surgery & bx. of inner thigh.aortic valve 2/24/23


Past Anesthesia/Blood Transfusion Reactions: No Reported Reaction


Date of Last Stent Placement:: 2006


Past Psychological History: No Psychological Hx Reported


Smoking Status: Former smoker


Past Alcohol Use History: None Reported


Past Drug Use History: None Reported





- Past Family History


  ** Mother


Family Medical History: Cancer, Pulmonary Embolus





  ** Father


Family Medical History: Cancer





  ** Brother(s)


Family Medical History: Cancer





  ** Son(s)


Family Medical History: Cancer





Medications and Allergies


                                Home Medications











 Medication  Instructions  Recorded  Confirmed  Type


 


Ascorbic Acid [Vitamin C] 1,000 mg PO DAILY 08/21/19 04/20/23 History


 


Aspirin 81 mg PO DAILY 08/21/19 04/20/23 History


 


Losartan [Cozaar] 50 mg PO DAILY 08/21/19 04/20/23 History


 


Metoprolol Tartrate [Lopressor] 25 mg PO BID 08/21/19 04/20/23 History


 


Nitroglycerin Sl Tabs [Nitrostat] 0.4 mg SL Q5M PRN 08/21/19 04/20/23 History


 


Omeprazole [PriLOSEC] 40 mg PO HS 08/21/19 04/20/23 History


 


Ubidecarenone [Co Q-10] 100 mg PO DAILY 08/21/19 04/20/23 History


 


Docusate [Colace] 100 mg PO BID 09/02/21 04/20/23 History


 


Albuterol Inhaler [Ventolin Hfa 1 - 2 puff INHALATION RT-QID PRN 12/07/22 04/2 0/23 History





Inhaler]    


 


Fluticasone/Vilanterol [Breo 1 puff INHALATION RT-QID PRN 12/07/22 04/20/23 

History





Ellipta 200-25 Mcg Inhaler]    


 


Cranberry Fruit [Cranberry] 465 mg PO DAILY 12/12/22 04/20/23 History


 


Furosemide [Lasix] 40 mg PO DAILY PRN 12/12/22 04/20/23 History


 


Quercetin 800 mg PO BID 12/12/22 04/20/23 History


 


Zinc Gluconate [Zinc] 50 mg PO DAILY 12/12/22 04/20/23 History


 


Pantoprazole [Protonix] 40 mg PO DAILY #30 tab 12/20/22 04/20/23 Rx


 


Centrum Cardio 2 tab PO DAILY 12/28/22 04/20/23 History


 


Cholecalciferol [Vitamin D3 (125 125 mcg PO DAILY 04/20/23 04/20/23 History





Mcg = 5000 Iu)]    


 


Clopidogrel [Plavix] 75 mg PO DAILY 04/20/23 04/20/23 History


 


Dasatinib [Sprycel] 100 mg PO DAILY 04/20/23 04/20/23 History


 


Prosymbiotic 1 tab PO BID 04/20/23 04/20/23 History


 


Sennosides/Docusate Sodium 1 tab PO BID 04/20/23 04/20/23 History





[Senna-S 8.6-50 mg Tablet]    


 


Tamsulosin HCl [Flomax] 0.4 mg PO DAILY 04/20/23 04/20/23 History


 


clindamycin  mg PO TID 04/20/23 04/20/23 History


 


valACYclovir HCL [Valtrex] 1,000 mg PO TID 04/20/23 04/20/23 History








                                    Allergies











Allergy/AdvReac Type Severity Reaction Status Date / Time


 


adhesive tape Allergy  Rash/Hives,"paper Verified 06/27/23 22:31





   tape is ok"  


 


amoxicillin [From Augmentin] Allergy  Rash/Hives Verified 06/27/23 22:31


 


atorvastatin [From Lipitor] Allergy  Muscle Verified 06/27/23 22:31





   Aches  


 


budesonide [From Symbicort] Allergy  Vision Verified 06/27/23 22:31





   issues  


 


ciprofloxacin Allergy  Muscle Verified 06/27/23 22:31





   aches  


 


clavulanic acid Allergy  Rash/Hives Verified 06/27/23 22:31





[From Augmentin]     


 


doxycycline Allergy  Rectal Verified 06/27/23 22:31





   Bleeding  


 


enalapril Allergy  Cough Verified 06/27/23 22:31


 


ezetimibe [From Vytorin] Allergy  Muscle Verified 06/27/23 22:31





   Aches  


 


fluticasone Allergy  Irregular Verified 06/27/23 22:31





[From Advair Diskus]   heart rate  


 


formoterol [From Symbicort] Allergy  Vision Verified 06/27/23 22:31





   issues  


 


mold Allergy  Rash/Hives Verified 06/27/23 22:31


 


salmeterol Allergy  Irregular Verified 06/27/23 22:31





[From Advair Diskus]   heart rate  


 


simvastatin [From Vytorin] Allergy  Muscle Verified 06/27/23 22:31





   Aches  


 


Statins-HMG-CoA Reductase Allergy  Muscle Verified 06/27/23 22:31





Inhibitor   aches  





[Statins-Hmg-Coa Reductase     





Inhibitor]     


 


nilotinib [From Tasigna] AdvReac  Pancreatiti Verified 06/27/23 22:31





   s  


 


Cotton material Allergy  itching, Uncoded 04/20/23 20:20





   hives  














Physical Exam


Vitals: 


                                   Vital Signs











  Temp Pulse Resp BP Pulse Ox


 


 06/27/23 22:28  97.0 F L  86  18  168/79  96








                                Intake and Output











 06/27/23 06/27/23 06/28/23





 14:59 22:59 06:59


 


Other:   


 


  Weight  93.44 kg 














Results


CBC & Chem 7: 


                                 06/27/23 22:52





                                 06/27/23 22:52


Labs: 


                  Abnormal Lab Results - Last 24 Hours (Table)











  06/27/23 06/27/23 06/27/23 Range/Units





  01:04 22:52 22:52 


 


RBC   2.82 L   (4.30-5.90)  m/uL


 


Hgb   9.7 L   (13.0-17.5)  gm/dL


 


Hct   28.0 L   (39.0-53.0)  %


 


Chloride    111 H  ()  mmol/L


 


Carbon Dioxide    19 L  (22-30)  mmol/L


 


BUN    30 H  (9-20)  mg/dL


 


Glucose    114 H  (74-99)  mg/dL


 


Plasma Lactic Acid Dave     (0.7-2.0)  mmol/L


 


Urine Protein  Trace H    (Negative)  


 


Urine Ketones  1+ H    (Negative)  


 


Ur Leukocyte Esterase  Large H    (Negative)  


 


Urine WBC  50 H    (0-5)  /hpf


 


Amorphous Sediment  Occasional H    (None)  /hpf


 


Urine Bacteria  Many H    (None)  /hpf


 


Urine Mucus  Occasional H    (None)  /hpf














  06/27/23 Range/Units





  22:52 


 


RBC   (4.30-5.90)  m/uL


 


Hgb   (13.0-17.5)  gm/dL


 


Hct   (39.0-53.0)  %


 


Chloride   ()  mmol/L


 


Carbon Dioxide   (22-30)  mmol/L


 


BUN   (9-20)  mg/dL


 


Glucose   (74-99)  mg/dL


 


Plasma Lactic Acid Dave  0.6 L  (0.7-2.0)  mmol/L


 


Urine Protein   (Negative)  


 


Urine Ketones   (Negative)  


 


Ur Leukocyte Esterase   (Negative)  


 


Urine WBC   (0-5)  /hpf


 


Amorphous Sediment   (None)  /hpf


 


Urine Bacteria   (None)  /hpf


 


Urine Mucus   (None)  /hpf














Assessment and Plan


Assessment: 





78 year old male with h/o CML , CAD s/p stents, coming in with abd pain , I 

discussed the case with ED doc, suspected UTI, I accepted the admission for IV 

antibiotics treatment of suspected UTI and further workup of infectious process,

with anticipated length of stay < 2 midnights 








UTI , suspected complex cystitis 


follow up cultures


rocephine 1 gm IVPB daily 


pain control with opiates


zofran for N/V


tylenol for fever


CT abd showed thickening of urinary bladder ( patient has long history of 

difficulty urinating )\


IVF hydration with normal saline 150 cc per hour 





chronic conditions 


hypertension 


resume losartan 





CAD /stents 


resume plavix and aspirin 





h/o CML





full code


DVT PPX lovenox 40 mg sc daily

## 2023-06-29 RX ADMIN — LOSARTAN POTASSIUM SCH MG: 50 TABLET, FILM COATED ORAL at 07:55

## 2023-06-29 RX ADMIN — ENOXAPARIN SODIUM SCH: 40 INJECTION SUBCUTANEOUS at 07:55

## 2023-06-29 RX ADMIN — TAMSULOSIN HYDROCHLORIDE SCH MG: 0.4 CAPSULE ORAL at 07:55

## 2023-06-29 RX ADMIN — HYDROMORPHONE HYDROCHLORIDE PRN MG: 1 INJECTION, SOLUTION INTRAMUSCULAR; INTRAVENOUS; SUBCUTANEOUS at 20:21

## 2023-06-29 RX ADMIN — METOPROLOL TARTRATE SCH MG: 25 TABLET, FILM COATED ORAL at 20:20

## 2023-06-29 RX ADMIN — HYDROMORPHONE HYDROCHLORIDE PRN MG: 1 INJECTION, SOLUTION INTRAMUSCULAR; INTRAVENOUS; SUBCUTANEOUS at 06:04

## 2023-06-29 RX ADMIN — PANTOPRAZOLE SODIUM SCH MG: 40 TABLET, DELAYED RELEASE ORAL at 20:20

## 2023-06-29 RX ADMIN — HYDROMORPHONE HYDROCHLORIDE PRN MG: 1 INJECTION, SOLUTION INTRAMUSCULAR; INTRAVENOUS; SUBCUTANEOUS at 01:18

## 2023-06-29 RX ADMIN — CLOPIDOGREL BISULFATE SCH MG: 75 TABLET ORAL at 07:55

## 2023-06-29 RX ADMIN — HYDROMORPHONE HYDROCHLORIDE PRN MG: 1 INJECTION, SOLUTION INTRAMUSCULAR; INTRAVENOUS; SUBCUTANEOUS at 14:42

## 2023-06-29 RX ADMIN — CEFAZOLIN SCH MLS/HR: 330 INJECTION, POWDER, FOR SOLUTION INTRAMUSCULAR; INTRAVENOUS at 15:01

## 2023-06-29 RX ADMIN — KETOROLAC TROMETHAMINE PRN MG: 15 INJECTION, SOLUTION INTRAMUSCULAR; INTRAVENOUS at 17:00

## 2023-06-29 RX ADMIN — KETOROLAC TROMETHAMINE PRN MG: 15 INJECTION, SOLUTION INTRAMUSCULAR; INTRAVENOUS at 10:29

## 2023-06-29 RX ADMIN — CEFAZOLIN SCH: 330 INJECTION, POWDER, FOR SOLUTION INTRAMUSCULAR; INTRAVENOUS at 22:11

## 2023-06-29 RX ADMIN — HYDROMORPHONE HYDROCHLORIDE PRN MG: 1 INJECTION, SOLUTION INTRAMUSCULAR; INTRAVENOUS; SUBCUTANEOUS at 09:05

## 2023-06-29 RX ADMIN — ASPIRIN 81 MG CHEWABLE TABLET SCH MG: 81 TABLET CHEWABLE at 07:55

## 2023-06-29 RX ADMIN — CEFAZOLIN SCH MLS/HR: 330 INJECTION, POWDER, FOR SOLUTION INTRAMUSCULAR; INTRAVENOUS at 05:45

## 2023-06-29 NOTE — US
EXAMINATION TYPE: US scrotum with doppler.  Grayscale and color Doppler Duplex imaging performed of [place] scrotum.

 

DATE OF EXAM: 6/29/2023

 

COMPARISON: US

 

CLINICAL INDICATION: Male, 78 years old with history of left testicular pain; Left testicle pain

 

EXAM MEASUREMENTS:

 

TESTICLES:

Right Testicle:  3.0 x 1.6 x 2.4 cm

Left Testicle:  3.3 x 1.5 x 2.2 cm

 

EPIDIDYMIS HEAD:

Right Epididymis:  1.0 cm

Left Epididymis:  1.0 cm  **Left epi head cyst= 0.4 x 0.4 x 0.3 cm

 

Doppler performed to assess for testicular vascularity; good bilateral color flow and waveforms are s
een.   There is no evidence of testicular torsion.

 

Presence of hydroceles:  No

Presence of varicoceles:  No

 

**Bilateral testicles heterogeneous with increased blood flow bilaterally**

 

IMPRESSION:

Increased vascularity to the testes correlate for epididymoorchitis. No evidence for mass.

## 2023-06-29 NOTE — P.PN
Subjective


Progress Note Date: 06/29/23





Hospital course:





Patient is a very pleasant 70-year-old male with a past medical history is CAD 

with stents, aortic valve replacement, hypertension, hyperlipidemia, and CML.





Labs completed and reviewed.  CBC showing normocytic anemia with hemoglobin of 

9.7.  BMP revealing hyperchloremia with chloride of 111 and hypocarbia with 

bicarb of 19 along with prerenal azotemia with BUN of 30, creatinine 0.82, and 

GFR of 85.  Urinalysis was positive for an infection.  CT abdomen and pelvis 

showing urinary bladder with thickening consistent with cystitis and 

cardiomegaly with pericardial effusion,





Physical exam:





Vital signs reviewed and stable. 


General: Nontoxic, no distress and appears stated age.  


Derm: Skin warm and dry, normal coloration for ethnicity.


Head: Atraumatic, normocephalic and symmetric.  


Eyes: EOMs intact, no lid lag, and anicteric sclera


Mouth: no lip lesions, mucus membranes moist


Cardiovascular: regular rate and rhythm with normal S1S2, no murmur, positive 

posterior tibial pulses bilaterally, and cap refill < 2 seconds.  


Lungs: Respirations even, regular, and unlabored on room air. Lungs CTA 

bilaterally, no rhonchi, no rales, no wheezing, and no accessory muscle usage. 


GI/: soft, nontender to palpation, no guarding, no appreciable organomegaly.  

Patient with left testicular pain and tenderness.  No swelling or redness noted.


Ext: ROM intact. No gross muscle atrophy, no edema, no contractures


Neuro: Speech clear, face symmetrical and CN II-XII grossly intact with no noted

focal neuro deficits


Psych: Alert and oriented to person, place, time, and situation. Appropriate and

pleasant affect. 





Assessment and Plan of Care:





Complex cystitis


Testicular pain, suspect orchitis


-Continuous IV antibiotic Rocephin 1 g IVPB daily pending further results.


-Symptomatic care and pain management.


-Order placed for testicular ultrasound secondary to left testicular pain.


-Consult placed to urology secondary to testicular pain.





History of CAD with stents


History of aortic valve replacement


Hypertension


Hyperlipidemia


History CML


-CT revealing cardiomegaly with pericardial effusion.  Patient reports being 3 

months out status post TAVR and both he and his wife are aware of cardiomegally 

and follow closely with cardiologist. 


-Patient currently free from cardiac complaints.


-Patient to continue cardiac medication regimen consisting of aspirin, Plavix, 

losartan, and metoprolol.








CODE STATUS: Full code


DVT prophylaxis: Lovenox


Discussed with: Patient, patient's wife and RN


Anticipated discharge date: Clinical course to determine


Anticipated discharge place: Home


Patient was seen independently by Nurse Pracitioner.


This document was prepared using Dragon dictation software.  Please allow for 

errors in transcription, while rare they do occur.





Adam Persaud NP rendered care for this patient independently, reviewed the 

findings and plan as documented in the note above.  I did not physically speak 

with or examine the patient on this date. 





Objective





- Vital Signs


Vital signs: 


                                   Vital Signs











Temp  98.0 F   06/29/23 07:00


 


Pulse  73   06/29/23 08:00


 


Resp  16   06/29/23 08:00


 


BP  156/66   06/29/23 07:00


 


Pulse Ox  99   06/29/23 07:00


 


FiO2      








                                 Intake & Output











 06/28/23 06/29/23 06/29/23





 18:59 06:59 18:59


 


Output Total 500 1200 200


 


Balance -500 -1200 -200


 


Output:   


 


  Urine 500 1200 200


 


    Straight  300 


 


Other:   


 


  Voiding Method Urinal Urinal Urinal





 Self-Catheterization Self-Catheterization Self-Catheterization














- Labs


CBC & Chem 7: 


                                 06/27/23 22:52





                                 06/27/23 22:52

## 2023-06-29 NOTE — P.CONS
History of Present Illness





- Reason for Consult


Consult date: 06/29/23


CML, on sprycel


Requesting physician: Kellie Rodriguez





- Chief Complaint


rt flank and lt groin pain





- History of Present Illness


Mister Yang is very well-known try practice, long history of CML and 

treatment for the same.  Malignancy history is as below.  Patient is currently 

admitted for severe right-sided pain, persistent over the last week, progressive

over the last several days, associated with constipation, left groin pain.  

Hasn't been able to eat much because of the pain, he is not able to move because

of the pain.  No fevers, chills, nausea or vomiting, notable abdominal 

distention.  He has not had a bowel movement in at least 4-5 days, he self 

catheterizes himself, been doing this for over a month. CT AP impression: 

urinary bladder wall thickening, possible cystitis, renal cystic changes, 

cardiomegaly with pericardial effusion, nonvisualization of the appendix, no 

significant abnormality of the genitals.  Testicular ultrasound, increased 

vascularity to the testes, correlate for epididymoorchitis, no evidence of mass.

 WBC 9, hemoglobin 9.7, normocytic, normochromic, platelet count 201,000


CML Hx:


Pt initially seen at [facility] in 2011 for a mild anemia, Hgb in the 13-14 range, w/u 

was negative, and he was followed with observation till 7/11, after which he 

continued f/u with his PCP, Dr Cheek. Since early 2015, it was noted that all 

his cell lines were showing an increase on serial blood draws. WBC/plt on 

12/17/14 were 12.9/257, and on 2/18/15 were 25.3, and 446, Hgb 14-15 range 

compared to his baseline of 13-13.5.  The pt has a h/o recurrent zoster 

infection involving the left eye, and a UTI in mid 2/15. However the increase in

counts was too persistent to be explained by the same. His WBC differential had 

also shown a left shift. He was thus referred for further evaluation.  The 

persistence, and involvement of multiple cell lines raised the possibility of a 

primary MPD, w/u was ordered. BCR-ABL was positive, confirming CML. Started 

tasigna 4/7/15, but was admitted to [facility] on 4/14/15 with pancreatitis, that 

appeared to be drug induced. Tasigna was stopped, and he improved with 

conservative management, with discharge on 4/16/15.  He continued to have GI 

discomfort and nausea with slow resolution. His GI complaints ultimately did 

resolve. He was started on Sprycel in 6/15.  BCR-ABL PCR had shown a slow upward

trend early 2016, then stabilized by 12/16. PCR testing was consistently above 

1% so, BM Bx done 1/4/17, revealed no evidence of progression. He has remained 

on sprycel since.  He was admitted Dec 2023 for bactremia s/p catherization.  He

was having some urinary incontinence at that time.  He was in ER in Jan for 

weakness.  He had TVAR on clinical trial at [facility] and was in ER in early April,

transferred to [facility].  He was seen by Urology for anuria, he has been straight 

catherizing himself for about 1-2 mo, has f/u with Urology in August.  





Review of Systems





10 point ROS is negative except as stated in HPI





Past Medical History


Past Medical History: Coronary Artery Disease (CAD), Cancer, GERD/Reflux, 

Hypertension, Skin Disorder


Additional Past Medical History / Comment(s): Hx. of CML, Hx. of Shingles L eye,

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


History of Any Multi-Drug Resistant Organisms: None Reported


Past Surgical History: Cholecystectomy, Heart Catheterization With Stent, Hernia

Repair, Orthopedic Surgery


Additional Past Surgical History / Comment(s): Thyroid surgery,Vasectomy, R 

ankle surgery, Sinus surgery, Basal cell R shoulder, Pyloroplasty & Vagotomy, 

Hemorrhoidectomy, Cataract surgery & bx. of inner thigh.aortic valve 2/24/23


Past Anesthesia/Blood Transfusion Reactions: No Reported Reaction


Date of Last Stent Placement:: 2006


Past Psychological History: No Psychological Hx Reported


Smoking Status: Former smoker


Past Alcohol Use History: None Reported


Past Drug Use History: None Reported





- Past Family History


  ** Mother


Family Medical History: Cancer, Pulmonary Embolus





  ** Father


Family Medical History: Cancer





  ** Brother(s)


Family Medical History: Cancer





  ** Son(s)


Family Medical History: Cancer





Medications and Allergies


                                Home Medications











 Medication  Instructions  Recorded  Confirmed  Type


 


Ascorbic Acid [Vitamin C] 1,000 mg PO DAILY 08/21/19 06/28/23 History


 


Aspirin 81 mg PO DAILY 08/21/19 06/28/23 History


 


Losartan [Cozaar] 50 mg PO DAILY 08/21/19 06/28/23 History


 


Metoprolol Tartrate [Lopressor] 25 mg PO BID 08/21/19 06/28/23 History


 


Nitroglycerin Sl Tabs [Nitrostat] 0.4 mg SL Q5M PRN 08/21/19 06/28/23 History


 


Omeprazole [PriLOSEC] 40 mg PO HS 08/21/19 06/28/23 History


 


Ubidecarenone [Co Q-10] 100 mg PO DAILY 08/21/19 06/28/23 History


 


Docusate [Colace] 100 mg PO BID 09/02/21 06/28/23 History


 


Albuterol Inhaler [Ventolin Hfa 1 - 2 puff INHALATION RT-QID PRN 12/07/22 06/28/23 History





Inhaler]    


 


Fluticasone/Vilanterol [Breo 1 puff INHALATION RT-QID PRN 12/07/22 06/28/23 

History





Ellipta 200-25 Mcg Inhaler]    


 


Cranberry Fruit [Cranberry] 465 mg PO DAILY 12/12/22 06/28/23 History


 


Furosemide [Lasix] 40 mg PO DAILY PRN 12/12/22 06/28/23 History


 


Quercetin 800 mg PO BID 12/12/22 06/28/23 History


 


Zinc Gluconate [Zinc] 50 mg PO DAILY 12/12/22 06/28/23 History


 


Centrum Cardio 2 tab PO DAILY 12/28/22 06/28/23 History


 


Cholecalciferol [Vitamin D3 (125 125 mcg PO DAILY 04/20/23 06/28/23 History





Mcg = 5000 Iu)]    


 


Clopidogrel [Plavix] 75 mg PO DAILY 04/20/23 06/28/23 History


 


Dasatinib [Sprycel] 100 mg PO DAILY 04/20/23 06/28/23 History


 


Prosymbiotic 1 tab PO BID 04/20/23 06/28/23 History


 


Sennosides/Docusate Sodium 1 tab PO BID 04/20/23 06/28/23 History





[Senna-S 8.6-50 mg Tablet]    


 


Tamsulosin HCl [Flomax] 0.4 mg PO DAILY 04/20/23 06/28/23 History


 


valACYclovir HCL [Valtrex] 1,000 mg PO TID 04/20/23 06/28/23 History


 


Alfuzosin HCl [Alfuzosin HCl ER] 10 mg PO DAILY 06/28/23 06/28/23 History








                                    Allergies











Allergy/AdvReac Type Severity Reaction Status Date / Time


 


adhesive tape Allergy  Rash/Hives,"paper Verified 06/28/23 09:49





   tape is ok"  


 


amoxicillin [From Augmentin] Allergy  Rash/Hives Verified 06/28/23 09:49


 


atorvastatin [From Lipitor] Allergy  Muscle Verified 06/28/23 09:49





   Aches  


 


budesonide [From Symbicort] Allergy  Vision Verified 06/28/23 09:49





   issues  


 


ciprofloxacin Allergy  Muscle Verified 06/28/23 09:49





   aches  


 


clavulanic acid Allergy  Rash/Hives Verified 06/28/23 09:49





[From Augmentin]     


 


mold Allergy  Rash/Hives Verified 06/28/23 09:49


 


doxycycline AdvReac  Rectal Verified 06/28/23 09:49





   Bleeding  


 


enalapril AdvReac  Cough Verified 06/28/23 09:49


 


ezetimibe [From Vytorin] AdvReac  Muscle Verified 06/28/23 09:49





   Aches  


 


fluticasone AdvReac  Irregular Verified 06/28/23 09:49





[From Advair Diskus]   heart rate  


 


formoterol [From Symbicort] AdvReac  Vision Verified 06/28/23 09:49





   issues  


 


nilotinib [From Tasigna] AdvReac  Pancreatiti Verified 06/28/23 09:49





   s  


 


salmeterol AdvReac  Irregular Verified 06/28/23 09:49





[From Advair Diskus]   heart rate  


 


simvastatin [From Vytorin] AdvReac  Muscle Verified 06/28/23 09:49





   Aches  


 


Statins-HMG-CoA Reductase AdvReac  Muscle Verified 06/28/23 09:49





Inhibitor   aches  





[Statins-Hmg-Coa Reductase     





Inhibitor]     


 


Cotton material Allergy  itching, Uncoded 04/20/23 20:20





   hives  














Physical Exam


Vitals: 


                                   Vital Signs











  Temp Pulse Resp BP BP Pulse Ox


 


 06/29/23 08:00   73  16   


 


 06/29/23 07:00  98.0 F  73  16  156/66   99


 


 06/29/23 02:00  98.7 F  76  16   143/78  96


 


 06/28/23 20:00  98.7 F  73  17   156/71  95


 


 06/28/23 14:30  98 F  65  16   146/64  98








                                Intake and Output











 06/28/23 06/29/23 06/29/23





 22:59 06:59 14:59


 


Output Total 


 


Balance -800 -400 -1150


 


Output:   


 


  Urine 


 


    Straight 300  


 


Other:   


 


  Voiding Method Urinal  Urinal





 Self-Catheterization  Self-Catheterization














- Constitutional


General appearance: average body habitus, cooperative, no acute distress





- EENT


Eyes: anicteric sclerae, EOMI


ENT: hearing grossly normal, normal oropharynx





- Neck


Neck: no lymphadenopathy





- Respiratory


Respiratory: bilateral: CTA





- Cardiovascular


Rhythm: regular


Heart sounds: normal: S1, S2


Abnormal Heart Sounds: no systolic murmur, no diastolic murmur, no rub, no S3 

Gallop, no S4 Gallop, no click, no other


  ** leg


Peripheral Edema: bilateral: Trace





- Gastrointestinal


General gastrointestinal: decreased bowel sounds, distended, tenderness 

(garding)


Localized gastrointestinal: tender: RUQ, RLQ





- Integumentary


Integumentary: normal





- Neurologic


Neurologic: CNII-XII intact





- Musculoskeletal


Musculoskeletal: generalized weakness





- Psychiatric


Psychiatric: A&O x's 3, appropriate affect, intact judgment & insight





Results


CBC & Chem 7: 


                                 06/27/23 22:52





                                 06/27/23 22:52


CT scan - abdomen: report reviewed


CT scan - pelvis: report reviewed





Assessment and Plan


(1) Right flank pain


Current Visit: Yes   Status: Acute   Priority: High   Code(s): R10.9 - 

UNSPECIFIED ABDOMINAL PAIN   SNOMED Code(s): 316017958


   





(2) CML (chronic myeloid leukemia)


Current Visit: No   Status: Chronic   Priority: Low   Code(s): C92.10 - CHRONIC 

MYELOID LEUK, BCR/ABL-POSITIVE, NOT ACHIEVE REMIS   SNOMED Code(s): 93683428


   


Plan: 


Right flank pain


-No reported bowel movement for at least 4 days


-Remote history of kidney stones


-Recent oliguria, patient has been self catheterizing himself


-Urology consulted


-Antibiotics started for suspicions of the UTI, CNS pending





CML


-Been on Sprycel for several years, good disease control


-Hold during acute illness.  Patient will be instructed when to resume by 

Oncology


-Nothing in the literature to suggest flank pain is r/t sprycel.  


-Patient is due to follow-up with Dr. Dumas this week. CBC and CMP Are not showing

anything concerning in regards to patient's CML.  We will await follow-up 

appointment to send out for BCR ABL testing


-Follow-up appointment will be placed in DC plan











 attests: I have seen and examined patient, performed H&P, developed 

impression and plan of care.  Discussed with dictator.  Agree with 

documentation, dictated as a scribe

## 2023-06-29 NOTE — P.GSCN
History of Present Illness


Consult date: 06/29/23


Reason for Consult: 





Cystitis, testicular pain


History of present illness: 





This is a 78-year-old male admitted to the hospital with flank pain and 

cystitis.  Urology is consulted for recurrent UTIs and testicular pain.  Patient

does have history of urinary retention, and I'll CICX2 .  He is able to void but

not completely empty his bladder..  He underwent a CT abdomen and pelvis on 

presentation showed no evidence of any stones or renal abnormality.  Of note 

there was stranding around the bladder and kidney .  Urine analysis on 

presentation was concerning for UTI, does not appear that her urine culture was 

obtained is currently on ceftriaxone.  He's also been having bilateral 

testicular pain, underwent a scrotal ultrasound which was consistent with 

epididymoorchitis





Review of Systems





- Constitutional


Denies chills, Denies fever





- EENT


Ears, nose, mouth and throat: Denies dysphagia





- Cardiovascular


Denies chest pain, Denies shortness of breath





- Respiratory


Denies cough, Denies 7





- Gastrointestinal


Reports as per HPI, Reports abdominal pain





- Genitourinary


Reports flank pain, Reports testicular pain





- Integumentary


Denies rash, Denies unusual bruising





- Neurological


Denies headaches, Denies syncope





Past Medical History


Past Medical History: Coronary Artery Disease (CAD), Cancer, GERD/Reflux, 

Hypertension, Skin Disorder


Additional Past Medical History / Comment(s): Hx. of CML, Hx. of Shingles L eye,

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


History of Any Multi-Drug Resistant Organisms: None Reported


Past Surgical History: Cholecystectomy, Heart Catheterization With Stent, Hernia

Repair, Orthopedic Surgery


Additional Past Surgical History / Comment(s): Thyroid surgery,Vasectomy, R 

ankle surgery, Sinus surgery, Basal cell R shoulder, Pyloroplasty & Vagotomy, 

Hemorrhoidectomy, Cataract surgery & bx. of inner thigh.aortic valve 2/24/23


Past Anesthesia/Blood Transfusion Reactions: No Reported Reaction


Date of Last Stent Placement:: 2006


Past Psychological History: No Psychological Hx Reported


Smoking Status: Former smoker


Past Alcohol Use History: None Reported


Past Drug Use History: None Reported





- Past Family History


  ** Mother


Family Medical History: Cancer, Pulmonary Embolus





  ** Father


Family Medical History: Cancer





  ** Brother(s)


Family Medical History: Cancer





  ** Son(s)


Family Medical History: Cancer





Medications and Allergies


                                Home Medications











 Medication  Instructions  Recorded  Confirmed  Type


 


Ascorbic Acid [Vitamin C] 1,000 mg PO DAILY 08/21/19 06/28/23 History


 


Aspirin 81 mg PO DAILY 08/21/19 06/28/23 History


 


Losartan [Cozaar] 50 mg PO DAILY 08/21/19 06/28/23 History


 


Metoprolol Tartrate [Lopressor] 25 mg PO BID 08/21/19 06/28/23 History


 


Nitroglycerin Sl Tabs [Nitrostat] 0.4 mg SL Q5M PRN 08/21/19 06/28/23 History


 


Omeprazole [PriLOSEC] 40 mg PO HS 08/21/19 06/28/23 History


 


Ubidecarenone [Co Q-10] 100 mg PO DAILY 08/21/19 06/28/23 History


 


Docusate [Colace] 100 mg PO BID 09/02/21 06/28/23 History


 


Albuterol Inhaler [Ventolin Hfa 1 - 2 puff INHALATION RT-QID PRN 12/07/22 06/28/23 History





Inhaler]    


 


Fluticasone/Vilanterol [Breo 1 puff INHALATION RT-QID PRN 12/07/22 06/28/23 His

tory





Ellipta 200-25 Mcg Inhaler]    


 


Cranberry Fruit [Cranberry] 465 mg PO DAILY 12/12/22 06/28/23 History


 


Furosemide [Lasix] 40 mg PO DAILY PRN 12/12/22 06/28/23 History


 


Quercetin 800 mg PO BID 12/12/22 06/28/23 History


 


Zinc Gluconate [Zinc] 50 mg PO DAILY 12/12/22 06/28/23 History


 


Centrum Cardio 2 tab PO DAILY 12/28/22 06/28/23 History


 


Cholecalciferol [Vitamin D3 (125 125 mcg PO DAILY 04/20/23 06/28/23 History





Mcg = 5000 Iu)]    


 


Clopidogrel [Plavix] 75 mg PO DAILY 04/20/23 06/28/23 History


 


Dasatinib [Sprycel] 100 mg PO DAILY 04/20/23 06/28/23 History


 


Prosymbiotic 1 tab PO BID 04/20/23 06/28/23 History


 


Sennosides/Docusate Sodium 1 tab PO BID 04/20/23 06/28/23 History





[Senna-S 8.6-50 mg Tablet]    


 


Tamsulosin HCl [Flomax] 0.4 mg PO DAILY 04/20/23 06/28/23 History


 


valACYclovir HCL [Valtrex] 1,000 mg PO TID 04/20/23 06/28/23 History


 


Alfuzosin HCl [Alfuzosin HCl ER] 10 mg PO DAILY 06/28/23 06/28/23 History








                                    Allergies











Allergy/AdvReac Type Severity Reaction Status Date / Time


 


adhesive tape Allergy  Rash/Hives,"paper Verified 06/28/23 09:49





   tape is ok"  


 


amoxicillin [From Augmentin] Allergy  Rash/Hives Verified 06/28/23 09:49


 


atorvastatin [From Lipitor] Allergy  Muscle Verified 06/28/23 09:49





   Aches  


 


budesonide [From Symbicort] Allergy  Vision Verified 06/28/23 09:49





   issues  


 


ciprofloxacin Allergy  Muscle Verified 06/28/23 09:49





   aches  


 


clavulanic acid Allergy  Rash/Hives Verified 06/28/23 09:49





[From Augmentin]     


 


mold Allergy  Rash/Hives Verified 06/28/23 09:49


 


doxycycline AdvReac  Rectal Verified 06/28/23 09:49





   Bleeding  


 


enalapril AdvReac  Cough Verified 06/28/23 09:49


 


ezetimibe [From Vytorin] AdvReac  Muscle Verified 06/28/23 09:49





   Aches  


 


fluticasone AdvReac  Irregular Verified 06/28/23 09:49





[From Advair Diskus]   heart rate  


 


formoterol [From Symbicort] AdvReac  Vision Verified 06/28/23 09:49





   issues  


 


nilotinib [From Tasigna] AdvReac  Pancreatiti Verified 06/28/23 09:49





   s  


 


salmeterol AdvReac  Irregular Verified 06/28/23 09:49





[From Advair Diskus]   heart rate  


 


simvastatin [From Vytorin] AdvReac  Muscle Verified 06/28/23 09:49





   Aches  


 


Statins-HMG-CoA Reductase AdvReac  Muscle Verified 06/28/23 09:49





Inhibitor   aches  





[Statins-Hmg-Coa Reductase     





Inhibitor]     


 


Cotton material Allergy  itching, Uncoded 04/20/23 20:20





   hives  














Surgical - Exam


                                   Vital Signs











Temp Pulse Resp BP Pulse Ox


 


 97.0 F L  86   18   168/79   96 


 


 06/27/23 22:28  06/27/23 22:28  06/27/23 22:28  06/27/23 22:28  06/27/23 22:28














- General


no distress, no pain





- Eyes


normal ocular movement, no pale





- ENT


normal nares, normal mucosa





- Respiratory


normal expansion, normal respiratory effort





- Abdomen


Abdomen: soft, tender (Right flank), no distended





- Genitourinary


normal penis with no external lesions


bilateral: tender (No induration, or fluctuance appreciated)





- Psychiatric


oriented to time, oriented to person, oriented to place





Results





- Labs





                                 06/27/23 22:52





                                 06/27/23 22:52


                      Microbiology - Last 24 Hours (Table)











 06/28/23 02:22 Blood Culture - Preliminary





 Blood 


 


 06/28/23 02:17 Blood Culture - Preliminary





 Blood 














Assessment and Plan


Assessment: 





78-year-old male admitted to the hospital with UTI and flank pain.  I reviewed 

the CT I don't see any evidence of obstructive stones or hydronephrosis.  His 

exam and ultrasound was consistent with epididymoorchitis


-Toradol PRN for pain


-Recommend 14 days of antibiotics is currently on ceftriaxone, does not appear 

urine culture was obtained.  From urology standpoint can be switched to Cipro as

an outpatient once stable for discharge


-Continue CIC


-Follow-up and has not outpatient with urology in 2-3 weeks

## 2023-06-30 VITALS — SYSTOLIC BLOOD PRESSURE: 150 MMHG | DIASTOLIC BLOOD PRESSURE: 75 MMHG | TEMPERATURE: 98.4 F | RESPIRATION RATE: 18 BRPM

## 2023-06-30 VITALS — HEART RATE: 71 BPM

## 2023-06-30 RX ADMIN — CLOPIDOGREL BISULFATE SCH MG: 75 TABLET ORAL at 08:19

## 2023-06-30 RX ADMIN — HYDROMORPHONE HYDROCHLORIDE PRN MG: 1 INJECTION, SOLUTION INTRAMUSCULAR; INTRAVENOUS; SUBCUTANEOUS at 06:41

## 2023-06-30 RX ADMIN — TAMSULOSIN HYDROCHLORIDE SCH MG: 0.4 CAPSULE ORAL at 08:18

## 2023-06-30 RX ADMIN — LOSARTAN POTASSIUM SCH MG: 50 TABLET, FILM COATED ORAL at 08:19

## 2023-06-30 RX ADMIN — KETOROLAC TROMETHAMINE PRN MG: 15 INJECTION, SOLUTION INTRAMUSCULAR; INTRAVENOUS at 08:19

## 2023-06-30 RX ADMIN — HYDROMORPHONE HYDROCHLORIDE PRN MG: 1 INJECTION, SOLUTION INTRAMUSCULAR; INTRAVENOUS; SUBCUTANEOUS at 12:48

## 2023-06-30 RX ADMIN — KETOROLAC TROMETHAMINE PRN MG: 15 INJECTION, SOLUTION INTRAMUSCULAR; INTRAVENOUS at 00:39

## 2023-06-30 RX ADMIN — ONDANSETRON PRN MG: 2 INJECTION INTRAMUSCULAR; INTRAVENOUS at 06:46

## 2023-06-30 RX ADMIN — ASPIRIN 81 MG CHEWABLE TABLET SCH MG: 81 TABLET CHEWABLE at 08:18

## 2023-06-30 RX ADMIN — HYDROMORPHONE HYDROCHLORIDE PRN MG: 1 INJECTION, SOLUTION INTRAMUSCULAR; INTRAVENOUS; SUBCUTANEOUS at 02:04

## 2023-06-30 RX ADMIN — ENOXAPARIN SODIUM SCH: 40 INJECTION SUBCUTANEOUS at 08:23

## 2023-06-30 RX ADMIN — CEFAZOLIN SCH: 330 INJECTION, POWDER, FOR SOLUTION INTRAMUSCULAR; INTRAVENOUS at 17:24

## 2023-06-30 RX ADMIN — METOPROLOL TARTRATE SCH MG: 25 TABLET, FILM COATED ORAL at 08:18

## 2023-06-30 RX ADMIN — CEFAZOLIN SCH MLS/HR: 330 INJECTION, POWDER, FOR SOLUTION INTRAMUSCULAR; INTRAVENOUS at 00:39

## 2023-06-30 RX ADMIN — CEFAZOLIN SCH: 330 INJECTION, POWDER, FOR SOLUTION INTRAMUSCULAR; INTRAVENOUS at 10:31

## 2023-06-30 RX ADMIN — ONDANSETRON PRN MG: 2 INJECTION INTRAMUSCULAR; INTRAVENOUS at 13:12

## 2023-06-30 NOTE — P.DS
Providers


Date of admission: 


06/28/23 02:42





Expected date of discharge: 06/30/23


Attending physician: 


Reba Storm MD





Consults: 





                                        





06/28/23 11:39


Consult Physician Routine 


   Consulting Provider: Montrell Dumas


   Consult Reason/Comments: Hx CML


   Do you want consulting provider notified?: Yes





06/29/23 11:49


Consult Physician Routine 


   Consulting Provider: Jasen Oliva


   Consult Reason/Comments: Recurrent cystitis and left testicular pain


   Do you want consulting provider notified?: Yes











Primary care physician: 


Mumtaz PEREZ [facility] Course: 





Discharge Diagnosis:





Complex cystitis with Epididymo-orchitis.  Neurology evaluated and recommending 

discharge home on ciprofloxacin 500 mg twice a day 14 days.  Patient to follow-

up outpatient with PCP in 1-2 days and urologist in 1 week. 





History of CAD with stents. Patient to continue cardiac medication regimen 

consisting of aspirin, Plavix, losartan, and metoprolol.





History of aortic valve replacement.  Continue to follow up outpatient with 

cardiologist and continue current medication regimen with aspirin, Plavix, 

losartan, metoprolol.





Hypertension.  Monitor vital signs and continue daily medication regimen with 

metoprolol.





Hyperlipidemia.  Continue to follow heart healthy diet.





CML.  Follow up outpatient with hematology/oncology as scheduled on 7/10/23 at 

9:45 AM.





Hospital Course: 





Patient is a very pleasant 70-year-old male with a past medical history is CAD 

with stents, aortic valve replacement, hypertension, hyperlipidemia, and CML.  

He presented to the emergency department on 6/27/23 with a chief complaint of 

lower back pain/flank pain, urinary retention having to straight cath self 

multiple times daily, and left testicular pain.  Pt underwent full evaluation in

the emergency department. Labs completed and reviewed.  CBC showing normocytic 

anemia with hemoglobin of 9.7.  BMP revealing hyperchloremia with chloride of 11

1 and hypocarbia with bicarb of 19 along with prerenal azotemia with BUN of 30, 

creatinine 0.82, and GFR of 85.  Urinalysis was positive for an infection.  CT 

abdomen and pelvis showing urinary bladder with thickening consistent with 

cystitis and cardiomegaly with pericardial effusion.  Patient was started on IV 

antibiotics with Rocephin.  He was admitted under services of consultation to 

urology.  Scrotal ultrasound was completed secondary to left testicular pain, 

scrotal ultrasound reporting increased vascularity to the testes correlating for

epididymoorchitis with no evidence of mass.  Patient was evaluated by urologist 

who is recommending discontinuation of IV antibiotics and discharging patient 

home on ciprofloxacin 500 mg every 12 hours 14 days.  Medically, patient is 

stable at this time.  Patient being discharged home on ciprofloxacin 500 mg 

twice daily 14 days along with Norco 5/325 mg tablet for pain management.  

Patient to continue to self catheterize as needed for urinary retention.  He is 

to follow up outpatient with PCP in 1-2 days, urology in 1 week, and 

hematology/oncology as scheduled on 7/10/23.  Patient being discharged home with

[facility].





Physical exam:





Vital signs reviewed and stable. 


General: Nontoxic, no distress and appears stated age.  


Derm: Skin warm and dry, normal coloration for ethnicity.


Head: Atraumatic, normocephalic and symmetric.  


Eyes: EOMs intact, no lid lag, and anicteric sclera


Mouth: no lip lesions, mucus membranes moist


Cardiovascular: regular rate and rhythm with normal S1S2, no murmur, positive 

posterior tibial pulses bilaterally, and cap refill < 2 seconds.  


Lungs: Respirations even, regular, and unlabored on room air. Lungs CTA 

bilaterally, no rhonchi, no rales, no wheezing, and no accessory muscle usage. 


GI/: soft, nontender to palpation, no guarding, no appreciable organomegaly.  

Patient with left testicular pain and tenderness.  No swelling or redness noted.


Ext: ROM intact. No gross muscle atrophy, no edema, no contractures


Neuro: Speech clear, face symmetrical and CN II-XII grossly intact with no noted

focal neuro deficits


Psych: Alert and oriented to person, place, time, and situation. Appropriate and

pleasant affect. 





A total of 33 minutes of time were spent preparing this complex discharge 

summary.


Pt was discharged on 6/30/23 at 9:32 AM.


Patient was seen independently by Nurse Practitioner.


This document was prepared using Dragon dictation software.  Please allow for 

errors in transcription while rare they do occur.








Adam Persaud NP rendered care for this patient independently, reviewed the 

findings and plan as documented in the note above.  I did not physically speak 

with or examine the patient on this date. 


Patient Condition at Discharge: Stable





Plan - Discharge Summary


New Discharge Prescriptions: 


New


   Ciprofloxacin HCl [Cipro] 500 mg PO Q12HR 14 Days #28 tab


   HYDROcodone/APAP 5-325MG [Norco 5-325] 1 - 2 tab PO Q6HR PRN #18 tab


     PRN Reason: Pain





Continue


   Nitroglycerin Sl Tabs [Nitrostat] 0.4 mg SL Q5M PRN


     PRN Reason: Pain


   Metoprolol Tartrate [Lopressor] 25 mg PO BID


   Losartan [Cozaar] 50 mg PO DAILY


   Aspirin 81 mg PO DAILY


   Ubidecarenone [Co Q-10] 100 mg PO DAILY


   Omeprazole [PriLOSEC] 40 mg PO HS


   Ascorbic Acid [Vitamin C] 1,000 mg PO DAILY


   valACYclovir HCL [Valtrex] 1,000 mg PO TID


   Dasatinib [Sprycel] 100 mg PO DAILY


   Alfuzosin HCl [Alfuzosin HCl ER] 10 mg PO DAILY


   Docusate [Colace] 100 mg PO BID


   Albuterol Inhaler [Ventolin Hfa Inhaler] 1 - 2 puff INHALATION RT-QID PRN


     PRN Reason: Shortness Of Breath


   Fluticasone/Vilanterol [Breo Ellipta 200-25 Mcg Inhaler] 1 puff INHALATION 

RT-QID PRN


     PRN Reason: Shortness Of Breath


   Quercetin 800 mg PO BID


   Zinc Gluconate [Zinc] 50 mg PO DAILY


   Cranberry Fruit [Cranberry] 465 mg PO DAILY


   Furosemide [Lasix] 40 mg PO DAILY PRN


     PRN Reason: fluid retention


   Centrum Cardio 2 tab PO DAILY


   Prosymbiotic 1 tab PO BID


   Cholecalciferol [Vitamin D3 (125 Mcg = 5000 Iu)] 125 mcg PO DAILY


   Sennosides/Docusate Sodium [Senna-S 8.6-50 mg Tablet] 1 tab PO BID


   Clopidogrel [Plavix] 75 mg PO DAILY


   Tamsulosin HCl [Flomax] 0.4 mg PO DAILY


Discharge Medication List





Ascorbic Acid [Vitamin C] 1,000 mg PO DAILY 08/21/19 [History]


Aspirin 81 mg PO DAILY 08/21/19 [History]


Losartan [Cozaar] 50 mg PO DAILY 08/21/19 [History]


Metoprolol Tartrate [Lopressor] 25 mg PO BID 08/21/19 [History]


Nitroglycerin Sl Tabs [Nitrostat] 0.4 mg SL Q5M PRN 08/21/19 [History]


Omeprazole [PriLOSEC] 40 mg PO HS 08/21/19 [History]


Ubidecarenone [Co Q-10] 100 mg PO DAILY 08/21/19 [History]


Docusate [Colace] 100 mg PO BID 09/02/21 [History]


Albuterol Inhaler [Ventolin Hfa Inhaler] 1 - 2 puff INHALATION RT-QID PRN 

12/07/22 [History]


Fluticasone/Vilanterol [Breo Ellipta 200-25 Mcg Inhaler] 1 puff INHALATION RT-

QID PRN 12/07/22 [History]


Cranberry Fruit [Cranberry] 465 mg PO DAILY 12/12/22 [History]


Furosemide [Lasix] 40 mg PO DAILY PRN 12/12/22 [History]


Quercetin 800 mg PO BID 12/12/22 [History]


Zinc Gluconate [Zinc] 50 mg PO DAILY 12/12/22 [History]


Centrum Cardio 2 tab PO DAILY 12/28/22 [History]


Cholecalciferol [Vitamin D3 (125 Mcg = 5000 Iu)] 125 mcg PO DAILY 04/20/23 

[History]


Clopidogrel [Plavix] 75 mg PO DAILY 04/20/23 [History]


Dasatinib [Sprycel] 100 mg PO DAILY 04/20/23 [History]


Prosymbiotic 1 tab PO BID 04/20/23 [History]


Sennosides/Docusate Sodium [Senna-S 8.6-50 mg Tablet] 1 tab PO BID 04/20/23 

[History]


Tamsulosin HCl [Flomax] 0.4 mg PO DAILY 04/20/23 [History]


valACYclovir HCL [Valtrex] 1,000 mg PO TID 04/20/23 [History]


Alfuzosin HCl [Alfuzosin HCl ER] 10 mg PO DAILY 06/28/23 [History]


Ciprofloxacin HCl [Cipro] 500 mg PO Q12HR 14 Days #28 tab 06/30/23 [Rx]


HYDROcodone/APAP 5-325MG [Norco 5-325] 1 - 2 tab PO Q6HR PRN #18 tab 06/30/23 

[Rx]








Follow up Appointment(s)/Referral(s): 


Montrell Dumas MD [STAFF PHYSICIAN] - 07/10/23 9:45 am


[facility], [NON-STAFF] - 1 Week


Jasen Oliva MD [STAFF PHYSICIAN] - 1 Week


Mumtaz Roland MD [Primary Care Provider] - 1-2 days


Patient Instructions/Handouts:  Epididymo-Orchitis (GEN), Urinary Retention in 

Men (ED), Flank Pain (ED)


Activity/Diet/Wound Care/Special Instructions: 





Activity:





As tolerated.  Take breaks as needed.





Diet: 





Heart healthy and carb consistent diet. Avoid salts, or foods with hidden salts 

such as canned or boxed foods and frozen dinners. Extra salt makes your heart 

work harder and traps the fluid in your body for longer.





Special Instructions:  





Take all of your medications as directed and remember to keep all of your 

doctor's appointments and follow-up as needed.  





You may resume Sprycel when directed by your hematologist/oncologist to resume.





Thank you for allowing us to participate in your care, it was truly a pleasure 

having you for our patient!!! 





Discharge Disposition: HOME SELF-CARE

## 2023-08-21 ENCOUNTER — HOSPITAL ENCOUNTER (OUTPATIENT)
Dept: HOSPITAL 47 - RADUSWWP | Age: 78
Discharge: HOME | End: 2023-08-21
Attending: UROLOGY
Payer: MEDICARE

## 2023-08-21 DIAGNOSIS — N28.1: ICD-10-CM

## 2023-08-21 DIAGNOSIS — N20.0: Primary | ICD-10-CM

## 2023-08-21 PROCEDURE — 74018 RADEX ABDOMEN 1 VIEW: CPT

## 2023-08-21 PROCEDURE — 76770 US EXAM ABDO BACK WALL COMP: CPT

## 2023-08-21 NOTE — US
EXAMINATION TYPE: US kidneys/renal and bladder

 

DATE OF EXAM: 8/21/2023

 

COMPARISON: NONE

 

CLINICAL INDICATION: Male, 78 years old with history of N20.0 CALCULUS OF KIDNEYS; Hx renal cysts

 

EXAM MEASUREMENTS:

 

Right Kidney:  10.6 x 5.2 x 4.3 cm

Left Kidney: 11.6 x 4.3 x 5.3   cm

 

 

 

 

Right Kidney: wnl  

Left Kidney: Lateral cyst 0.8 x 0.9 x 0.9 cm; medial cyst 8.3 x 6.9 x 7.8 cm  

Bladder: Debris within; patient self caths and unable to cath currently

**Bilateral Jets seen: Yes

 

 

IMPRESSION:

 

1. Large medial left renal cyst measuring 8 cm.

2. Some mild debris within urinary bladder.

## 2023-08-21 NOTE — XR
EXAMINATION TYPE: XR KUB

 

DATE OF EXAM: 8/21/2023

 

Comparison: None

 

Clinical History: 78-year-old male N20.0 CALCULUS OF KIDNEY

 

Findings:

Gassy bowel loops throughout the abdomen. Moderate stool in the ascending colon. Surgical clips in th
e upper abdomen. No dilated small bowel. Suspect a redundant sigmoid colon. Bowel content largely obs
cures the renal shadows.

 

 

Impression:

Gassy bowel. Moderate stool in the ascending colon. Suspect a redundant sigmoid colon. Overall nonobs
tructive bowel gas pattern.

## 2023-11-30 ENCOUNTER — HOSPITAL ENCOUNTER (OUTPATIENT)
Dept: HOSPITAL 47 - RADECHMAIN | Age: 78
Discharge: HOME | End: 2023-11-30
Attending: FAMILY MEDICINE
Payer: MEDICARE

## 2023-11-30 DIAGNOSIS — I25.9: Primary | ICD-10-CM

## 2023-11-30 PROCEDURE — 93306 TTE W/DOPPLER COMPLETE: CPT

## 2023-12-01 NOTE — CA
Transthoracic Echo Report 

 Name: Bryant Yang 

 MRN:    [id number] 

 Age:    78     Gender:     M 

 

 :    1945 

 Exam Date:     2023 14:51 

 Exam Location: [place] [facility] 

 Ht (in):     71     Wt (lb):     206 

 Ordering Physician:        Mumtaz Roland MD 

 Attending/Referring Phys:         Catrachito Bonilla MD  

                                   ([id number]) 

 Technician         Clementina Iglesias [facility] 

 Procedure CPT: 

 Indications:       I25.9 CHRONIC ISCHEMIC HEART DISEASE, 

 

 Cardiac Hx: 

 Technical Quality:      Fair 

 Contrast 1:                                Total Dose (mL): 

 Contrast 2:                                Total Dose (mL): 

 

 MEASUREMENTS  (Male / Female) Normal Values 

 2D ECHO 

 LV Diastolic Diameter PLAX        5.9 cm                4.2 - 5.9 / 3.9 - 5.3 cm 

 LV Systolic Diameter PLAX         3.7 cm                 

 IVS Diastolic Thickness           1.0 cm                0.6 - 1.0 / 0.6 - 0.9 cm 

 LVPW Diastolic Thickness          1.1 cm                0.6 - 1.0 / 0.6 - 0.9 cm 

 LV Relative Wall Thickness        0.4                    

 Ascending Aorta Diameter          2.8 cm                 

 

 DOPPLER 

 AV Peak Velocity                  267.9 cm/s             

 AV Peak Gradient                  28.7 mmHg              

 AV Mean Velocity                  203.2 cm/s             

 AV Mean Gradient                  18.2 mmHg              

 AV Velocity Time Integral         58.1 cm                

 LVOT Peak Velocity                146.2 cm/s             

 LVOT Peak Gradient                8.6 mmHg               

 LVOT Velocity Time Integral       34.4 cm                

 Mitral E Point Velocity           75.7 cm/s              

 Mitral A Point Velocity           103.1 cm/s             

 Mitral E to A Ratio               0.7                    

 MV Deceleration Time              351.1 ms               

 TR Peak Velocity                  260.9 cm/s             

 TR Peak Gradient                  27.2 mmHg              

 Right Atrial Pressure             8.0 mmHg               

 Pulmonary Artery Systolic Pressu  35.2 mmHg              

 Right Ventricular Systolic Press  35.2 mmHg              

 

 

 FINDINGS 

 Left Ventricle 

 Left ventricular cavity size at the upper limits of normal. Left ventricular  

 wall thickness at upper limits of normal. Normal left ventricular systolic  

 function with no obvious regional wall motion abnormalities. Left ventricular  

 ejection fraction is estimated at 55-60%. 

 

 Right Ventricle 

 Mild right ventricular dilatation. Mild pulmonary hypertension. 

 

 Right Atrium 

 Mild right atrial dilatation. 

 

 Left Atrium 

 Severe left atrial dilatation. 

 

 Mitral Valve 

 Mitral valve thickened. Moderate mitral regurgitation. 

 

 Aortic Valve 

 Normally functioning bioprosthetic aortic valve without stenosis with a peak  

 gradient 28.70 mmHg, mean gradient 18.20 mmHg. Trace prosthetic aortic valve  

 regurgitation. 

 

 Tricuspid Valve 

 Structurally normal tricuspid valve. Mild tricuspid regurgitation. 

 

 Pulmonic Valve 

 Structurally normal pulmonic valve. Mild pulmonic regurgitation. 

 

 Pericardium 

 Small pericardial effusion. 

 

 Aorta 

 Normal size aortic root and proximal ascending aorta. 

 

 CONCLUSIONS 

 Normal LV function 

 Normally functioning bioprosthetic valve in aortic position 

 Previewed by:  

 Dr. Scott Whelan MD 

 (Electronically Signed) 

 Final Date:      2023 09:11

## 2023-12-04 ENCOUNTER — HOSPITAL ENCOUNTER (OUTPATIENT)
Dept: HOSPITAL 47 - LABWHC1 | Age: 78
Discharge: HOME | End: 2023-12-04
Attending: INTERNAL MEDICINE
Payer: MEDICARE

## 2023-12-04 DIAGNOSIS — N39.0: Primary | ICD-10-CM

## 2023-12-04 PROCEDURE — 87086 URINE CULTURE/COLONY COUNT: CPT

## 2023-12-04 PROCEDURE — 81001 URINALYSIS AUTO W/SCOPE: CPT

## 2023-12-05 LAB
PH UR: 5.5 [PH]
SP GR UR: 1.02 (ref 1–1.03)
UROBILINOGEN UR QL: 0.2 E.U./DL

## 2023-12-27 ENCOUNTER — HOSPITAL ENCOUNTER (OUTPATIENT)
Dept: HOSPITAL 47 - RADUSWWP | Age: 78
Discharge: HOME | End: 2023-12-27
Attending: FAMILY MEDICINE
Payer: MEDICARE

## 2023-12-27 DIAGNOSIS — N50.89: Primary | ICD-10-CM

## 2023-12-27 DIAGNOSIS — N50.811: ICD-10-CM

## 2023-12-27 DIAGNOSIS — N50.812: ICD-10-CM

## 2023-12-27 PROCEDURE — 93975 VASCULAR STUDY: CPT

## 2023-12-27 PROCEDURE — 76870 US EXAM SCROTUM: CPT

## 2023-12-27 NOTE — US
EXAMINATION TYPE: US scrotum with doppler.  Grayscale and color Doppler Duplex imaging performed of [place]ang scrotum.

 

DATE OF EXAM: 12/27/2023

 

COMPARISON: 6/20/2023

 

CLINICAL INDICATION: Male, 78 years old with history of N50.819 TESTICULAR PAIN, UNSPECIFIED; ongoing
 bilateral pain for over 1 year, was on antibiotics over the summer, US in June

 

EXAM MEASUREMENTS:

 

TESTICLES:

Right Testicle:  2.7 x 2.4 x 1.5 cm

Left Testicle:  2.8 x 2.3 x 1.6 cm

 

EPIDIDYMIS HEAD:

Right Epididymis:  0.5 cm

Left Epididymis:  1.1cm - 2 anechoic cysts, largest = 0.5cm , and second = 0.3cm     

 

Doppler performed to assess for testicular vascularity; good bilateral color flow and waveforms are s
een.   There is no evidence of testicular torsion.

 

Presence of hydroceles:  mild adjacent to left epididymis 

Presence of varicoceles:  no

 

testicles bilaterally have heterogeneous echotexture 

 

 

 

IMPRESSION:

1.  Heterogenous bilateral testis appearance with increased texture and increased flow which is symme
tric. Correlate for signs and symptoms of epididymoorchitis otherwise no evidence for acute process.

2.  No evidence for intratesticular mass.

3.  Appropriate arterial and venous spectral waveforms bilaterally.

## 2024-02-07 ENCOUNTER — HOSPITAL ENCOUNTER (OUTPATIENT)
Dept: HOSPITAL 47 - RADCTMAIN | Age: 79
Discharge: HOME | End: 2024-02-07
Attending: FAMILY MEDICINE
Payer: MEDICARE

## 2024-02-07 DIAGNOSIS — M51.16: Primary | ICD-10-CM

## 2024-02-07 DIAGNOSIS — M99.73: ICD-10-CM

## 2024-02-07 DIAGNOSIS — M48.061: ICD-10-CM

## 2024-02-07 DIAGNOSIS — M79.651: ICD-10-CM

## 2024-02-07 PROCEDURE — 72131 CT LUMBAR SPINE W/O DYE: CPT

## 2024-02-07 NOTE — CT
EXAMINATION TYPE: CT lumbar spine wo con

 

DATE OF EXAM: 2/7/2024 10:17 AM

 

COMPARISON: None

 

HISTORY: back pain

 

CT DLP: 1000 mGycm

Automated exposure control for dose reduction was used.

 

Unenhanced CT of the lumbar spine was performed.  Bone and soft tissue window settings are submitted 
as well as coronal and sagittal reconstructions. 

 

L1-L2: Mild degenerative disc space narrowing. Left paracentral disc bulge or small herniation appear
s to result in left lateral recess stenosis. Left foraminal encroachment noted. Definite central sten
osis

 

L2-L3: Vacuum disc with severe degenerative disc space narrowing. Grade 1 retrolisthesis of L2 on L3 
of 4 mm. Moderate posterior disc bulge. Hypertrophy of ligamentum flavum and facet joint arthropathy 
resulting in moderate central stenosis. Moderate right greater than left foraminal encroachment.

 

L3-L4: Vacuum disc with severe degenerative disc space narrowing. Grade 1 retrolisthesis of L2 on L3 
of 4 mm. Moderate posterior disc bulge. Hypertrophy of ligamentum flavum and facet joint arthropathy 
resulting in moderate central stenosis. Severe right greater than left foraminal encroachment.

 

L4-L5: Mild-to-moderate degenerative disc space narrowing. Posterior disc bulges partially encapsulat
ing spur resulting in disc endplate complex. At least moderate central stenosis noted. Mild to modera
te bilateral foraminal encroachment. Severe facet joint arthropathy.

 

L5-S1: Mild degenerative disc space narrowing. Right paracentral disc bulge with right lateral recess
 stenosis and right foraminal encroachment. No central stenosis seen. Severe facet joint arthropathy.


 

 

No paraspinal masses are identified.  Lumbar segments are intact. 

 

 

IMPRESSION:

1. Multilevel degenerative disc disease with multilevel central stenosis and foraminal encroachment a
s outlined above.

## 2024-02-07 NOTE — CT
EXAMINATION TYPE: CT hip RT wo con

 

DATE OF EXAM: 2/7/2024

 

COMPARISON: None

 

HISTORY: right hip pain

 

CT DLP: 523 mGycm

Automated exposure control for dose reduction was used. Unenhanced CT of the right hip was performed 
with bone and soft tissue window settings obtained in the axial, coronal and sagittal planes.

 

FINDINGS: 

There is no evidence of fracture or dislocation. No soft tissue mass seen. No bony destructive proces
s evident. Mild degenerative narrowing right hip joint space. Pelvic masses identified.

 

IMPRESSION: 

MILD DEGENERATIVE NARROWING RIGHT HIP JOINT SPACE.

## 2024-05-20 NOTE — XR
EXAMINATION TYPE: XR hand complete RT

 

DATE OF EXAM: 12/28/2022

 

COMPARISON: None

 

HISTORY: Pain, swelling

 

TECHNIQUE: 3 view right hand

 

FINDINGS: Some degenerative joint changes at the first carpal metacarpal junction. No acute fractures
 or dislocations evident. Soft tissues appear normal.

 

Follow up exams can be performed as clinically indicated.

 

IMPRESSION:

1.  Degenerative joint changes at the carpal metacarpal junction first digit
EXAMINATION TYPE: XR wrist complete RT

 

DATE OF EXAM: 12/28/2022

 

COMPARISON: None

 

HISTORY: Pain, swelling

 

TECHNIQUE: 4 view right wrist

 

FINDINGS: Degenerative joint changes are at the first carpal metacarpal junction. Scaphoid trapezium 
joint space also is degenerative change.

 

No acute fractures are evident. The soft tissues appear normal.

 

If there is pain at the anatomic snuff box, nuclear medicine bone scan could be performed for additio
nal evaluation.

 

IMPRESSION:

1.  No acute osseous abnormality right wrist.

2. Degenerative joint changes first carpal metacarpal junction
Moses Alcantar is a 27 y.o. year old male who presents today for   Chief Complaint   Patient presents with    New Patient       Is someone accompanying this pt? no    Is the patient using any DME equipment during OV? no    Depression Screenin/20/2024     3:44 PM   PHQ-9 Questionaire   Little interest or pleasure in doing things 0   Feeling down, depressed, or hopeless 0   PHQ-9 Total Score 0       Abuse Screening:       No data to display                Learning Assessment:  No question data found.    Fall Risk:       No data to display                    Coordination of Care:   1. \"Have you been to the ER, urgent care clinic since your last visit?  Hospitalized since your last visit?\" no    2. \"Have you seen or consulted any other health care providers outside of the [facility] since your last visit?\" no    3. For patients aged 45-75: Has the patient had a colonoscopy / FIT/ Cologuard? yes    If the patient is female:    4. For patients aged 40-74: Has the patient had a mammogram within the past 2 years? no    5. For patients aged 21-65: Has the patient had a pap smear? no    Health Maintenance: reviewed and discussed and ordered per Provider.    Health Maintenance Due   Topic Date Due    Hepatitis B vaccine (1 of 3 - 3-dose series) Never done    COVID-19 Vaccine (1) Never done    Varicella vaccine (1 of 2 - 2-dose childhood series) Never done    Depression Screen  Never done    HIV screen  Never done    Hepatitis C screen  Never done    DTaP/Tdap/Td vaccine (1 - Tdap) Never done        - [facility]  Phone: [phone]  Fax: [phone]  
Future    Encounter for HIV pre-exposure prophylaxis  -continue PrEP  -     emtricitabine-tenofovir alafenamide (DESCOVY) 200-25 MG TABS tablet; Take 1 tablet by mouth daily    Contact with and (suspected) exposure to infections with a predominantly sexual mode of transmission  -script for post exposure prophylaxis sent   -     doxycycline hyclate (VIBRA-TABS) 100 MG tablet; Take 2 tablets by mouth once as needed (Doxy PEP)    Routine screening for STI (sexually transmitted infection)  -     HIV 1/2 Ag/Ab, 4TH Generation,W Rflx Confirm; Future  -     RPR; Future  -     Chlamydia, Gonorrhea, Trichomoniasis; Future    Encounter for hepatitis C screening test for low risk patient  -     Hepatitis C Antibody; Future          On this date 5/20/2024 I have spent 30 minutes reviewing previous notes, test results and face to face with the patient discussing the diagnosis and importance of compliance with the treatment plan as well as documenting on the day of the visit.    I have discussed the diagnosis with the patient and the intended plan as seen in the above orders.  The patient has received an after-visit summary and questions were answered concerning future plans.  I have discussed medication side effects and warnings with the patient as well. I have reviewed the plan of care with the patient, accepted their input and they are in agreement with the treatment goals.     Follow-up and Dispositions    Return in about 6 months (around 11/20/2024) for , also schedule lab appt .       Mely Reddy MD  May 20, 2024

## 2024-09-22 ENCOUNTER — HOSPITAL ENCOUNTER (EMERGENCY)
Dept: HOSPITAL 47 - EC | Age: 79
Discharge: HOME | End: 2024-09-22
Payer: MEDICARE

## 2024-09-22 VITALS — DIASTOLIC BLOOD PRESSURE: 86 MMHG | TEMPERATURE: 98.3 F | HEART RATE: 88 BPM | SYSTOLIC BLOOD PRESSURE: 145 MMHG

## 2024-09-22 VITALS — RESPIRATION RATE: 18 BRPM

## 2024-09-22 LAB
ALBUMIN SERPL-MCNC: 4 G/DL (ref 3.5–5)
ALP SERPL-CCNC: 56 U/L (ref 38–126)
ALT SERPL-CCNC: 18 U/L (ref 4–49)
ANION GAP SERPL CALC-SCNC: 7 MMOL/L
AST SERPL-CCNC: 30 U/L (ref 17–59)
BASOPHILS # BLD AUTO: 0 K/UL (ref 0–0.2)
BASOPHILS NFR BLD AUTO: 1 %
BUN SERPL-SCNC: 23 MG/DL (ref 9–20)
CALCIUM SPEC-MCNC: 9.2 MG/DL (ref 8.4–10.2)
CHLORIDE SERPL-SCNC: 108 MMOL/L (ref 98–107)
CO2 SERPL-SCNC: 23 MMOL/L (ref 22–30)
EOSINOPHIL # BLD AUTO: 0.1 K/UL (ref 0–0.7)
EOSINOPHIL NFR BLD AUTO: 1 %
ERYTHROCYTE [DISTWIDTH] IN BLOOD BY AUTOMATED COUNT: 2.63 M/UL (ref 4.3–5.9)
ERYTHROCYTE [DISTWIDTH] IN BLOOD: 13.8 % (ref 11.5–15.5)
GLUCOSE SERPL-MCNC: 110 MG/DL (ref 74–99)
HCT VFR BLD AUTO: 28.3 % (ref 39–53)
HGB BLD-MCNC: 9.4 GM/DL (ref 13–17.5)
LYMPHOCYTES # SPEC AUTO: 2.7 K/UL (ref 1–4.8)
LYMPHOCYTES NFR SPEC AUTO: 40 %
MCH RBC QN AUTO: 35.6 PG (ref 25–35)
MCHC RBC AUTO-ENTMCNC: 33 G/DL (ref 31–37)
MCV RBC AUTO: 107.9 FL (ref 80–100)
MONOCYTES # BLD AUTO: 0.6 K/UL (ref 0–1)
MONOCYTES NFR BLD AUTO: 8 %
NEUTROPHILS # BLD AUTO: 3.3 K/UL (ref 1.3–7.7)
NEUTROPHILS NFR BLD AUTO: 48 %
PLATELET # BLD AUTO: 258 K/UL (ref 150–450)
POTASSIUM SERPL-SCNC: 4.1 MMOL/L (ref 3.5–5.1)
PROT SERPL-MCNC: 6.5 G/DL (ref 6.3–8.2)
SODIUM SERPL-SCNC: 138 MMOL/L (ref 137–145)
WBC # BLD AUTO: 6.7 K/UL (ref 3.8–10.6)

## 2024-09-22 PROCEDURE — 86140 C-REACTIVE PROTEIN: CPT

## 2024-09-22 PROCEDURE — 36415 COLL VENOUS BLD VENIPUNCTURE: CPT

## 2024-09-22 PROCEDURE — 99283 EMERGENCY DEPT VISIT LOW MDM: CPT

## 2024-09-22 PROCEDURE — 80053 COMPREHEN METABOLIC PANEL: CPT

## 2024-09-22 PROCEDURE — 83605 ASSAY OF LACTIC ACID: CPT

## 2024-09-22 PROCEDURE — 85025 COMPLETE CBC W/AUTO DIFF WBC: CPT

## 2024-09-22 NOTE — ED
General Adult HPI





- General


Chief complaint: Recheck/Abnormal Lab/Rx


Stated complaint: Urogenital


Time Seen by Provider: 09/22/24 17:40


Source: patient, RN notes reviewed


Mode of arrival: wheelchair


Limitations: no limitations





- History of Present Illness


Initial comments: 





70-year-old male presenting with wife for right foot pain x 1 day.  Describes a 

sharp pain on the bottom of his foot radiating to the top of his foot that began

last night.  Denies trauma or injury.  Denies swelling or redness.  Denies fever

or chills.  States he went to his PCPs clinic this morning where they diagnosed 

him with cellulitis of the right foot and prescribed Keflex and told him to come

to the ER if symptoms worsen.  Patient states he has taken 2 of the Keflex, ho

wever is still having pain in his right foot.  He is able to ambulate.  He 

reports he was also told he had a UTI at the clinic however denies any new 

urinary symptoms.  Denies blood thinners.  Denies heart failure or kidney 

issues.  Denies recent surgeries, travel, history of DVT, patient is a non-s

moker.  Denies calf pain.





- Related Data


                                Home Medications











 Medication  Instructions  Recorded  Confirmed


 


Ascorbic Acid [Vitamin C] 1,000 mg PO DAILY 08/21/19 06/28/23


 


Aspirin 81 mg PO DAILY 08/21/19 06/28/23


 


Losartan [Cozaar] 50 mg PO DAILY 08/21/19 06/28/23


 


Metoprolol Tartrate [Lopressor] 25 mg PO BID 08/21/19 06/28/23


 


Nitroglycerin Sl Tabs [Nitrostat] 0.4 mg SL Q5M PRN 08/21/19 06/28/23


 


Omeprazole [PriLOSEC] 40 mg PO HS 08/21/19 06/28/23


 


Ubidecarenone [Co Q-10] 100 mg PO DAILY 08/21/19 06/28/23


 


Docusate [Colace] 100 mg PO BID 09/02/21 06/28/23


 


Albuterol Inhaler [Ventolin Hfa 1 - 2 puff INHALATION RT-QID PRN 12/07/22 06/28/23





Inhaler]   


 


Fluticasone/Vilanterol [Breo 1 puff INHALATION RT-QID PRN 12/07/22 06/28/23





Ellipta 200-25 Mcg Inhaler]   


 


Cranberry Fruit [Cranberry] 465 mg PO DAILY 12/12/22 06/28/23


 


Furosemide [Lasix] 40 mg PO DAILY PRN 12/12/22 06/28/23


 


Quercetin 800 mg PO BID 12/12/22 06/28/23


 


Zinc Gluconate [Zinc] 50 mg PO DAILY 12/12/22 06/28/23


 


Centrum Cardio 2 tab PO DAILY 12/28/22 06/28/23


 


Cholecalciferol [Vitamin D3 (125 125 mcg PO DAILY 04/20/23 06/28/23





Mcg = 5000 Iu)]   


 


Clopidogrel [Plavix] 75 mg PO DAILY 04/20/23 06/28/23


 


Dasatinib [Sprycel] 100 mg PO DAILY 04/20/23 06/28/23


 


Prosymbiotic 1 tab PO BID 04/20/23 06/28/23


 


Sennosides/Docusate Sodium 1 tab PO BID 04/20/23 06/28/23





[Senna-S 8.6-50 mg Tablet]   


 


Tamsulosin HCl [Flomax] 0.4 mg PO DAILY 04/20/23 06/28/23


 


valACYclovir HCL [Valtrex] 1,000 mg PO TID 04/20/23 06/28/23


 


Alfuzosin HCl [Alfuzosin HCl ER] 10 mg PO DAILY 06/28/23 06/28/23








                                  Previous Rx's











 Medication  Instructions  Recorded


 


Ciprofloxacin HCl [Cipro] 500 mg PO Q12HR 14 Days #28 tab 06/30/23


 


HYDROcodone/APAP 5-325MG [Norco 1 - 2 tab PO Q6HR PRN #18 tab 06/30/23





5-325]  











                                    Allergies











Allergy/AdvReac Type Severity Reaction Status Date / Time


 


adhesive tape Allergy  Rash/Hives,"paper Verified 09/22/24 17:24





   tape is ok"  


 


amoxicillin [From Augmentin] Allergy  Rash/Hives Verified 09/22/24 17:24


 


atorvastatin [From Lipitor] Allergy  Muscle Verified 09/22/24 17:24





   Aches  


 


budesonide [From Symbicort] Allergy  Vision Verified 09/22/24 17:24





   issues  


 


ciprofloxacin Allergy  Muscle Verified 09/22/24 17:24





   aches  


 


clavulanic acid Allergy  Rash/Hives Verified 09/22/24 17:24





[From Augmentin]     


 


mold Allergy  Rash/Hives Verified 09/22/24 17:24


 


doxycycline AdvReac  Rectal Verified 09/22/24 17:24





   Bleeding  


 


enalapril AdvReac  Cough Verified 09/22/24 17:24


 


ezetimibe [From Vytorin] AdvReac  Muscle Verified 09/22/24 17:24





   Aches  


 


fluticasone AdvReac  Irregular Verified 09/22/24 17:24





[From Advair Diskus]   heart rate  


 


formoterol [From Symbicort] AdvReac  Vision Verified 09/22/24 17:24





   issues  


 


nilotinib [From Tasigna] AdvReac  Pancreatiti Verified 09/22/24 17:24





   s  


 


salmeterol AdvReac  Irregular Verified 09/22/24 17:24





[From Advair Diskus]   heart rate  


 


simvastatin [From Vytorin] AdvReac  Muscle Verified 09/22/24 17:24





   Aches  


 


Statins-HMG-CoA Reductase AdvReac  Muscle Verified 09/22/24 17:24





Inhibitor   aches  





[Statins-Hmg-Coa Reductase     





Inhibitor]     


 


Cotton material Allergy  itching, Uncoded 09/22/24 17:24





   hives  














Review of Systems


ROS Statement: 


Those systems with pertinent positive or pertinent negative responses have been 

documented in the HPI.





ROS Other: All systems not noted in ROS Statement are negative.





Past Medical History


Past Medical History: Coronary Artery Disease (CAD), Cancer, GERD/Reflux, 

Hypertension, Skin Disorder


Additional Past Medical History / Comment(s): Hx. of CML, Hx. of Shingles L eye,

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


History of Any Multi-Drug Resistant Organisms: None Reported


Past Surgical History: Cholecystectomy, Heart Catheterization With Stent, Hernia

Repair, Orthopedic Surgery


Additional Past Surgical History / Comment(s): Thyroid surgery,Vasectomy, R 

ankle surgery, Sinus surgery, Basal cell R shoulder, Pyloroplasty & Vagotomy, 

Hemorrhoidectomy, Cataract surgery & bx. of inner thigh.aortic valve 2/24/23


Past Anesthesia/Blood Transfusion Reactions: No Reported Reaction


Date of Last Stent Placement:: 2006


Past Psychological History: No Psychological Hx Reported


Smoking Status: Former smoker


Past Alcohol Use History: None Reported


Past Drug Use History: None Reported





- Past Family History


  ** Mother


Family Medical History: Cancer, Pulmonary Embolus





  ** Father


Family Medical History: Cancer





  ** Brother(s)


Family Medical History: Cancer





  ** Son(s)


Family Medical History: Cancer





General Exam


Limitations: no limitations


General appearance: alert, in no apparent distress


Head exam: Present: atraumatic, normocephalic, normal inspection


  ** Right


Knee exam: Present: normal inspection, full ROM.  Absent: tenderness, swelling, 

abrasion, laceration


Lower Leg exam: Present: normal inspection, full ROM.  Absent: tenderness, 

swelling, abrasion


Ankle exam: Present: normal inspection, full ROM, swelling (Mild edema).  

Absent: tenderness


Foot/Toe exam: Present: normal inspection, full ROM, tenderness (Diffuse mild 

tenderness and edema to ventral and dorsal aspect of right foot without 

overlying skin changes, erythema, or purulence.  Full range of motion of ankle 

and digits), swelling (Mild diffuse edema of right foot)


Neurovascular tendon exam: Present: no vascular compromise.  Absent: pulse 

deficit, abnormal cap refill, sensory deficit


Neurological exam: Present: alert, oriented X3


Psychiatric exam: Present: normal affect, normal mood


Skin exam: Present: warm, dry, intact, normal color.  Absent: rash





Course


                                   Vital Signs











  09/22/24 09/22/24





  17:21 18:24


 


Temperature 97.7 F 98 F


 


Pulse Rate 78 73


 


Respiratory 18 18





Rate  


 


Blood Pressure 148/77 145/73


 


O2 Sat by Pulse 99 99





Oximetry  














Medical Decision Making





- Medical Decision Making





Was pt. sent in by a medical professional or institution (, PA, NP, urgent 

care, hospital, or nursing home...) When possible be specific


@ -No


Did you speak to anyone other than the patient for history (EMS, parent, family,

police, friend...)? What history was obtained from this source 


@ -Wife supplemented history


Did you review nursing and triage notes (agree or disagree)? Why? 


@ -I reviewed and agree with nursing and triage notes


Were old charts reviewed (outside hosp., previous admission, EMS record, old 

EKG, old radiological studies, urgent care reports/EKG's, nursing home records)?

Report findings 


@ -No old charts were reviewed


Differential Diagnosis (chest pain, altered mental status, abdominal pain women,

abdominal pain men, vaginal bleeding, weakness, fever, dyspnea, syncope, 

headache, dizziness, GI bleed, back pain, seizure, CVA, palpatations, mental 

health, musculoskeletal)? 


@ -Differential Musculoskeletal


Muscular strain, contusion, ligament sprain, fracture, arthritis, septic 

arthritis, bursitis, cellulitis, muscle spasm, nerve compression, DVT, arterial 

occlusion, herpes zoster, electrolyte abnormality, tumor.... This is not meant 

to be in all inclusive list


EKG interpreted by me (3pts min.).


@ -None


X-rays interpreted by me (1pt min.).


@ -X-ray right foot reveals no acute process


CT interpreted by me (1pt min.).


@ -None done


U/S interpreted by me (1pt. min.).


@ -None done


What testing was considered but not performed or refused? (CT, X-rays, U/S, 

labs)? Why?


@ -None


What meds were considered but not given or refused? Why?


@ -None


Did you discuss the management of the patient with other professionals 

(professionals i.e. , PA, NP, lab, RT, psych nurse, , , te

acher, , )? Give summary


@ -No


Was smoking cessation discussed for >3mins.?


@ -No


Was critical care preformed (if so, how long)?


@ -No


Were there social determinants of health that impacted care today? How? 

(Homelessness, low income, unemployed, alcoholism, drug addiction, 

transportation, low edu. Level, literacy, decrease access to med. care, longterm, 

rehab)?


@ -No


Was there de-escalation of care discussed even if they declined (Discuss DNR or 

withdrawal of care, Hospice)? DNR status


@ -No


What co-morbidities impacted this encounter? (DM, HTN, Smoking, COPD, CAD, 

Cancer, CVA, ARF, Chemo, Hep., AIDS, mental health diagnosis, sleep apnea, 

morbid obesity)?


@ -None


Was patient admitted / discharged? Hospital course, mention meds given and 

route, prescriptions, significant lab abnormalities, going to OR and other 

pertinent info.


@ -Patient was discharged.  This is a 79-year-old male presenting with right 

foot pain x 1 day.  Patient was diagnosed with cellulitis earlier today at 

clinic and prescribed Keflex.  Patient states pain is worsening. Vital signs 

within normal limits.  Neurovascularly intact.  Patient has full range of 

motion, there is minimal erythema and edema.  Lab including CBC, CMP, CRP, 

lactic acid obtained and unremarkable.  X-ray of right foot reveals no acute 

process.  Discussed negative findings with patient.  Advised patient to continue

Keflex as prescribed for right foot cellulitis and return to the emergency 

department with new or worsening symptoms.  Patient is agreeable to plan.  Case 

was discussed with my ED attending Dr. Marin.  Patient discharged in stable 

condition.


Undiagnosed new problem with uncertain prognosis?


@ -No


Drug Therapy requiring intensive monitoring for toxicity (Heparin, Nitro, 

Insulin, Cardizem)?


@ -No


Were any procedures done?


@ -No


Diagnosis/symptom?


@ -Right foot cellulitis


Acute, or Chronic, or Acute on Chronic?


@ -Acute


Uncomplicated (without systemic symptoms) or Complicated (systemic symptoms)?


@ -Uncomplicated


Side effects of treatment?


@ -No


Exacerbation, Progression, or Severe Exacerbation?


@ -No


Poses a threat to life or bodily function? How? (Chest pain, USA, MI, pneumonia,

PE, COPD, DKA, ARF, appy, cholecystitis, CVA, Diverticulitis, Homicidal, 

Suicidal, threat to staff... and all critical care pts)


@ -Not at this time





- Lab Data


Result diagrams: 


                                 09/22/24 18:22





                                 09/22/24 18:22


                                   Lab Results











  09/22/24 09/22/24 09/22/24 Range/Units





  18:22 18:22 18:22 


 


WBC  6.7    (3.8-10.6)  k/uL


 


RBC  2.63 L    (4.30-5.90)  m/uL


 


Hgb  9.4 L    (13.0-17.5)  gm/dL


 


Hct  28.3 L    (39.0-53.0)  %


 


MCV  107.9 H    (80.0-100.0)  fL


 


MCH  35.6 H    (25.0-35.0)  pg


 


MCHC  33.0    (31.0-37.0)  g/dL


 


RDW  13.8    (11.5-15.5)  %


 


Plt Count  258    (150-450)  k/uL


 


MPV  7.1    


 


Neutrophils %  48    %


 


Lymphocytes %  40    %


 


Monocytes %  8    %


 


Eosinophils %  1    %


 


Basophils %  1    %


 


Neutrophils #  3.3    (1.3-7.7)  k/uL


 


Lymphocytes #  2.7    (1.0-4.8)  k/uL


 


Monocytes #  0.6    (0-1.0)  k/uL


 


Eosinophils #  0.1    (0-0.7)  k/uL


 


Basophils #  0.0    (0-0.2)  k/uL


 


Macrocytosis  Moderate    


 


Sodium   138   (137-145)  mmol/L


 


Potassium   4.1   (3.5-5.1)  mmol/L


 


Chloride   108 H   ()  mmol/L


 


Carbon Dioxide   23   (22-30)  mmol/L


 


Anion Gap   7   mmol/L


 


BUN   23 H   (9-20)  mg/dL


 


Creatinine   1.19   (0.66-1.25)  mg/dL


 


Est GFR (CKD-EPI)AfAm   67   (>60 ml/min/1.73 sqM)  


 


Est GFR (CKD-EPI)NonAf   58   (>60 ml/min/1.73 sqM)  


 


Glucose   110 H   (74-99)  mg/dL


 


Plasma Lactic Acid Dave    1.1  (0.7-2.0)  mmol/L


 


Calcium   9.2   (8.4-10.2)  mg/dL


 


Total Bilirubin   0.5   (0.2-1.3)  mg/dL


 


AST   30   (17-59)  U/L


 


ALT   18   (4-49)  U/L


 


Alkaline Phosphatase   56   ()  U/L


 


C-Reactive Protein   <0.5   (<1.0)  mg/dL


 


Total Protein   6.5   (6.3-8.2)  g/dL


 


Albumin   4.0   (3.5-5.0)  g/dL














Disposition


Clinical Impression: 


 Cellulitis of foot, right





Disposition: HOME SELF-CARE


Condition: Stable


Instructions (If sedation given, give patient instructions):  Cellulitis (ED)


Additional Instructions: 


Continue Keflex as prescribed.  Elevate right foot.  Please return to the 

Emergency Department if symptoms worsen or any other concerns.


Is patient prescribed a controlled substance at d/c from ED?: No


Referrals: 


Mmutaz Roland MD [Primary Care Provider] - 1-2 days


Time of Disposition: 21:47

## 2024-09-22 NOTE — XR
EXAMINATION TYPE: XR foot complete RT

 

DATE OF EXAM: 9/22/2024

 

COMPARISON: None

 

HISTORY: Right foot pain and cellulitis

 

TECHNIQUE: 3 view right foot

 

FINDINGS: No acute fracture or dislocation is evident. Joint spaces within the foot appear preserved.
 The tibial tarsal junction appears to have fusion. Soft tissues appear normal. Small plantar calcane
al heel spur is present. No suspicious air within the soft tissues is evident. No suspicious cortical
 erosions evident

 

IMPRESSION:

1.  No acute osseous abnormality right foot

 

[facility], Workstation: [facility], 9/22/2024 6:14 PM

## 2024-09-23 ENCOUNTER — HOSPITAL ENCOUNTER (OUTPATIENT)
Dept: HOSPITAL 47 - EC | Age: 79
Setting detail: OBSERVATION
LOS: 2 days | Discharge: HOME | End: 2024-09-25
Attending: INTERNAL MEDICINE | Admitting: INTERNAL MEDICINE
Payer: MEDICARE

## 2024-09-23 DIAGNOSIS — N39.0: ICD-10-CM

## 2024-09-23 DIAGNOSIS — L98.9: ICD-10-CM

## 2024-09-23 DIAGNOSIS — R60.0: Primary | ICD-10-CM

## 2024-09-23 DIAGNOSIS — M79.604: ICD-10-CM

## 2024-09-23 DIAGNOSIS — I11.9: ICD-10-CM

## 2024-09-23 DIAGNOSIS — K21.9: ICD-10-CM

## 2024-09-23 DIAGNOSIS — D53.9: ICD-10-CM

## 2024-09-23 DIAGNOSIS — Z79.82: ICD-10-CM

## 2024-09-23 DIAGNOSIS — I25.10: ICD-10-CM

## 2024-09-23 DIAGNOSIS — Z79.899: ICD-10-CM

## 2024-09-23 DIAGNOSIS — Z79.02: ICD-10-CM

## 2024-09-23 DIAGNOSIS — R10.30: ICD-10-CM

## 2024-09-23 DIAGNOSIS — Z87.891: ICD-10-CM

## 2024-09-23 DIAGNOSIS — M10.9: ICD-10-CM

## 2024-09-23 LAB
ALBUMIN SERPL-MCNC: 4.3 G/DL (ref 3.5–5)
ALP SERPL-CCNC: 62 U/L (ref 38–126)
ALT SERPL-CCNC: 18 U/L (ref 4–49)
ANION GAP SERPL CALC-SCNC: 1 MMOL/L
AST SERPL-CCNC: 30 U/L (ref 17–59)
BASOPHILS # BLD AUTO: 0 K/UL (ref 0–0.2)
BASOPHILS NFR BLD AUTO: 0 %
BUN SERPL-SCNC: 21 MG/DL (ref 9–20)
CALCIUM SPEC-MCNC: 9.3 MG/DL (ref 8.4–10.2)
CHLORIDE SERPL-SCNC: 111 MMOL/L (ref 98–107)
CO2 SERPL-SCNC: 23 MMOL/L (ref 22–30)
EOSINOPHIL # BLD AUTO: 0 K/UL (ref 0–0.7)
EOSINOPHIL NFR BLD AUTO: 0 %
ERYTHROCYTE [DISTWIDTH] IN BLOOD BY AUTOMATED COUNT: 2.7 M/UL (ref 4.3–5.9)
ERYTHROCYTE [DISTWIDTH] IN BLOOD: 13.7 % (ref 11.5–15.5)
GLUCOSE SERPL-MCNC: 105 MG/DL (ref 74–99)
HCT VFR BLD AUTO: 29 % (ref 39–53)
HGB BLD-MCNC: 9.7 GM/DL (ref 13–17.5)
LYMPHOCYTES # SPEC AUTO: 3.8 K/UL (ref 1–4.8)
LYMPHOCYTES NFR SPEC AUTO: 40 %
MCH RBC QN AUTO: 35.9 PG (ref 25–35)
MCHC RBC AUTO-ENTMCNC: 33.4 G/DL (ref 31–37)
MCV RBC AUTO: 107.4 FL (ref 80–100)
MONOCYTES # BLD AUTO: 0.6 K/UL (ref 0–1)
MONOCYTES NFR BLD AUTO: 6 %
NEUTROPHILS # BLD AUTO: 5 K/UL (ref 1.3–7.7)
NEUTROPHILS NFR BLD AUTO: 52 %
NT-PROBNP SERPL-MCNC: 1120 PG/ML
PLATELET # BLD AUTO: 233 K/UL (ref 150–450)
POTASSIUM SERPL-SCNC: 4.3 MMOL/L (ref 3.5–5.1)
PROT SERPL-MCNC: 6.7 G/DL (ref 6.3–8.2)
SODIUM SERPL-SCNC: 135 MMOL/L (ref 137–145)
WBC # BLD AUTO: 9.5 K/UL (ref 3.8–10.6)

## 2024-09-23 PROCEDURE — 86140 C-REACTIVE PROTEIN: CPT

## 2024-09-23 PROCEDURE — 96376 TX/PRO/DX INJ SAME DRUG ADON: CPT

## 2024-09-23 PROCEDURE — 73701 CT LOWER EXTREMITY W/DYE: CPT

## 2024-09-23 PROCEDURE — 99285 EMERGENCY DEPT VISIT HI MDM: CPT

## 2024-09-23 PROCEDURE — 80053 COMPREHEN METABOLIC PANEL: CPT

## 2024-09-23 PROCEDURE — 83880 ASSAY OF NATRIURETIC PEPTIDE: CPT

## 2024-09-23 PROCEDURE — 96374 THER/PROPH/DIAG INJ IV PUSH: CPT

## 2024-09-23 PROCEDURE — 93306 TTE W/DOPPLER COMPLETE: CPT

## 2024-09-23 PROCEDURE — 94640 AIRWAY INHALATION TREATMENT: CPT

## 2024-09-23 PROCEDURE — 82607 VITAMIN B-12: CPT

## 2024-09-23 PROCEDURE — 36415 COLL VENOUS BLD VENIPUNCTURE: CPT

## 2024-09-23 PROCEDURE — 71046 X-RAY EXAM CHEST 2 VIEWS: CPT

## 2024-09-23 PROCEDURE — 97161 PT EVAL LOW COMPLEX 20 MIN: CPT

## 2024-09-23 PROCEDURE — 82747 ASSAY OF FOLIC ACID RBC: CPT

## 2024-09-23 PROCEDURE — 96372 THER/PROPH/DIAG INJ SC/IM: CPT

## 2024-09-23 PROCEDURE — 80048 BASIC METABOLIC PNL TOTAL CA: CPT

## 2024-09-23 PROCEDURE — 85025 COMPLETE CBC W/AUTO DIFF WBC: CPT

## 2024-09-23 PROCEDURE — 93971 EXTREMITY STUDY: CPT

## 2024-09-23 RX ADMIN — LOSARTAN POTASSIUM SCH: 50 TABLET, FILM COATED ORAL at 16:50

## 2024-09-23 RX ADMIN — DOCUSATE SODIUM SCH: 100 CAPSULE, LIQUID FILLED ORAL at 21:23

## 2024-09-23 RX ADMIN — CEPHALEXIN SCH MG: 500 CAPSULE ORAL at 16:47

## 2024-09-23 RX ADMIN — LORATADINE SCH: 10 TABLET ORAL at 15:45

## 2024-09-23 RX ADMIN — ASPIRIN 81 MG CHEWABLE TABLET SCH: 81 TABLET CHEWABLE at 15:45

## 2024-09-23 RX ADMIN — FUROSEMIDE STA: 10 INJECTION, SOLUTION INTRAMUSCULAR; INTRAVENOUS at 16:48

## 2024-09-23 RX ADMIN — ACETAMINOPHEN STA MG: 500 TABLET ORAL at 12:00

## 2024-09-23 RX ADMIN — METOPROLOL TARTRATE SCH MG: 25 TABLET, FILM COATED ORAL at 21:27

## 2024-09-23 RX ADMIN — BUDESONIDE AND FORMOTEROL FUMARATE DIHYDRATE SCH PUFF: 160; 4.5 AEROSOL RESPIRATORY (INHALATION) at 20:53

## 2024-09-23 RX ADMIN — ENOXAPARIN SODIUM SCH: 40 INJECTION SUBCUTANEOUS at 18:17

## 2024-09-23 RX ADMIN — FUROSEMIDE SCH MG: 10 INJECTION, SOLUTION INTRAMUSCULAR; INTRAVENOUS at 21:28

## 2024-09-23 NOTE — P.HPIM
History of Present Illness


H&P Date: 09/23/24


History of present illness;


Bryant Yang 79-year-old male presented the right lower extremity pain.  

Patient was seen by urgent care yesterday and started on Keflex for concern of 

cellulitis. Patient states he started to have worsening of symptoms including 

swelling and erythema to right lower extremity which brought him to the ER last 

night.  Patient had laboratory studies drawn which were negative for acute 

process.  Patient was instructed to continue taking Keflex as prescribed and 

follow-up with PCP.  He states since discharge from ER he has been having 

difficulty ambulating due to pain in his right foot and increase in pain to his 

right groin.  No new injuries or traumas.  He attest to fever overnight of 

100.4.  He has been taking over-the-counter Tylenol for symptom control.  Coco weber denies chest pain, palpitations, abdominal pain, nausea, vomiting, 

constipation/diarrhea.  Patient does state he currently has UTI and states he 

self catheterizes himself. No history of DVT or blood thinner use. No new 

urinary complaints. 


 


Initial lab work done in the ER showed WBC 9.5, hemoglobin 9.7, platelets 233, 

sodium 135, potassium 4.3, chloride 111, BUN 21, creatinine 1.13, glucose 105.





Chest x-ray done in the ER with cardiomegaly


Venous Doppler study of the right leg with no DVT


Right foot CT with no acute fracture of right foot


 


Patient admitted to internal medicine service


 


REVIEW OF SYSTEMS: 


CONSTITUTIONAL: No fever, no malaise, no fatigue. 


HEENT: No recent visual problems or hearing problems. Denied any sore throat. 


CARDIOVASCULAR: No chest pain, orthopnea, PND, no palpitations, no syncope. 


PULMONARY: No shortness of breath, no cough, no hemoptysis. 


GASTROINTESTINAL: No diarrhea, no nausea, no vomiting, no abdominal pain. 


NEUROLOGICAL: No headaches, no weakness, no numbness. 


HEMATOLOGICAL: Denies any bleeding or petechiae. 


GENITOURINARY: Groin pain associated with urination


MUSCULOSKELETAL/RHEUMATOLOGICAL: Denies any joint pain, swelling, or any muscle 

pain. 


ENDOCRINE: Denies any polyuria or polydipsia. 


 


The rest of the 14-point review of systems is negative.


 


 


PHYSICAL EXAMINATION: 


 


GENERAL: The patient is alert and oriented x3, not in any acute distress. Well 

developed, well nourished. 


HEENT: Pupils are round and equally reacting to light. EOMI. No scleral icterus.

No conjunctival pallor. Normocephalic, atraumatic. No pharyngeal erythema. No 

thyromegaly. 


CARDIOVASCULAR: S1 and S2 present. No murmurs, rubs, or gallops. 


PULMONARY: Chest is clear to auscultation, no wheezing or crackles. 


ABDOMEN: Soft, nontender, nondistended, normoactive bowel sounds. No palpable 

organomegaly.  Suprapubic tenderness.


MUSCULOSKELETAL: No joint swelling or deformity.


EXTREMITIES: No cyanosis, clubbing, 2+ pedal edema. 


NEUROLOGICAL: Gross neurological examination did not reveal any focal deficits. 


SKIN: No rashes. 


 


Assessment and plan


#Bilateral lower extremity edema


 Ultrasound and CT of foot, no DVT, no acute fracture


Echo ordered


 Increased dose of Lasix 40 mg IV push twice daily


 Monitor BMP





#UTI


afebrile


 Order UA


 Plan to continue antibiotics


 


Chronic Medical Conditions


-Will continue home meds


 


Monitor vital signs


Monitor CBC


Monitor CMP


Labs and medication were reviewed..   Continue with symptomatic treatment.  

Resume home medication.  Monitor labs and vitals.  DVT prophylaxis.  Further re

commendations as per clinical course of the patient


 


Dictation was produced using dragon dictation software. please excuse any 

grammatical, word or spelling errors.





I saw and evaluated the patient during the key and critical portions of this 

encounter, and discussed the case in detail with the resident author of this 

note, I agree with the Assessment and Plan, and my changes, if any, are highlig

hted in blue.





Past Medical History


Past Medical History: Coronary Artery Disease (CAD), Cancer, GERD/Reflux, 

Hypertension, Skin Disorder


Additional Past Medical History / Comment(s): Hx. of CML, Hx. of Shingles L eye,

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


History of Any Multi-Drug Resistant Organisms: None Reported


Past Surgical History: Cholecystectomy, Heart Catheterization With Stent, Hernia

Repair, Orthopedic Surgery


Additional Past Surgical History / Comment(s): Thyroid surgery,Vasectomy, R 

ankle surgery, Sinus surgery, Basal cell R shoulder, Pyloroplasty & Vagotomy, 

Hemorrhoidectomy, Cataract surgery & bx. of inner thigh.aortic valve 2/24/23


Past Anesthesia/Blood Transfusion Reactions: No Reported Reaction


Date of Last Stent Placement:: 2006


Past Psychological History: No Psychological Hx Reported


Smoking Status: Former smoker


Past Alcohol Use History: None Reported


Past Drug Use History: None Reported





- Past Family History


  ** Mother


Family Medical History: Cancer, Pulmonary Embolus





  ** Father


Family Medical History: Cancer





  ** Brother(s)


Family Medical History: Cancer





  ** Son(s)


Family Medical History: Cancer





Medications and Allergies


                                Home Medications











 Medication  Instructions  Recorded  Confirmed  Type


 


Ascorbic Acid [Vitamin C] 1,000 mg PO DAILY 08/21/19 09/23/24 History


 


Aspirin 81 mg PO DAILY 08/21/19 09/23/24 History


 


Losartan [Cozaar] 75 mg PO DAILY 08/21/19 09/23/24 History


 


Metoprolol Tartrate [Lopressor] 25 mg PO BID 08/21/19 09/23/24 History


 


Nitroglycerin Sl Tabs [Nitrostat] 0.4 mg SL Q5M PRN 08/21/19 09/23/24 History


 


Ubidecarenone [Co Q-10] 100 mg PO DAILY 08/21/19 09/23/24 History


 


Docusate [Colace] 100 mg PO BID 09/02/21 09/23/24 History


 


Albuterol Inhaler [Ventolin Hfa 1 - 2 puff INHALATION RT-QID PRN 12/07/22 09/23/24 History





Inhaler]    


 


Fluticasone/Vilanterol [Breo 1 puff INHALATION RT-QID PRN 12/07/22 09/23/24 

History





Ellipta 200-25 Mcg Inhaler]    


 


Cranberry Fruit [Cranberry] 465 mg PO DAILY 12/12/22 09/23/24 History


 


Furosemide [Lasix] 40 mg PO DAILY PRN 12/12/22 09/23/24 History


 


Quercetin 800 mg PO BID 12/12/22 09/23/24 History


 


Zinc Gluconate [Zinc] 50 mg PO DAILY 12/12/22 09/23/24 History


 


Cholecalciferol [Vitamin D3 (125 125 mcg PO DAILY 04/20/23 09/23/24 History





Mcg = 5000 Iu)]    


 


Dasatinib [Sprycel] 100 mg PO DAILY@0300 04/20/23 09/23/24 History


 


Cephalexin [Keflex] 500 mg PO TID 09/23/24 09/23/24 History


 


Cetirizine HCl [Zyrtec] 10 mg PO DAILY 09/23/24 09/23/24 History


 


L.acidoph,Paracasei, B.lactis 1 cap PO TID 09/23/24 09/23/24 History





[Probiotic]    


 


Multivitamin/Iron/Folic Acid 1 tab PO DAILY 09/23/24 09/23/24 History





[Centrum Adults Tablet]    


 


Turmeric Root Extract [Turmeric] 500 mg PO DAILY 09/23/24 09/23/24 History


 


valACYclovir HCL [Valtrex] 1,000 mg PO TID 09/23/24 09/23/24 History








                                    Allergies











Allergy/AdvReac Type Severity Reaction Status Date / Time


 


adhesive tape Allergy  Rash/Hives,"paper Verified 09/23/24 13:40





   tape is ok"  


 


amoxicillin [From Augmentin] Allergy  Rash/Hives Verified 09/23/24 13:40


 


atorvastatin [From Lipitor] Allergy  Muscle Verified 09/23/24 13:40





   Aches  


 


budesonide [From Symbicort] Allergy  Vision Verified 09/23/24 13:40





   issues  


 


ciprofloxacin Allergy  Muscle Verified 09/23/24 13:40





   aches  


 


clavulanic acid Allergy  Rash/Hives Verified 09/23/24 13:40





[From Augmentin]     


 


mold Allergy  Rash/Hives Verified 09/23/24 13:40


 


doxycycline AdvReac  Rectal Verified 09/23/24 13:40





   Bleeding  


 


enalapril AdvReac  Cough Verified 09/23/24 13:40


 


ezetimibe [From Vytorin] AdvReac  Muscle Verified 09/23/24 13:40





   Aches  


 


fluticasone AdvReac  Irregular Verified 09/23/24 13:40





[From Advair Diskus]   heart rate  


 


formoterol [From Symbicort] AdvReac  Vision Verified 09/23/24 13:40





   issues  


 


nilotinib [From Tasigna] AdvReac  Pancreatiti Verified 09/23/24 13:40





   s  


 


salmeterol AdvReac  Irregular Verified 09/23/24 13:40





[From Advair Diskus]   heart rate  


 


simvastatin [From Vytorin] AdvReac  Muscle Verified 09/23/24 13:40





   Aches  


 


Statins-HMG-CoA Reductase AdvReac  Muscle Verified 09/23/24 13:40





Inhibitor   aches  





[Statins-Hmg-Coa Reductase     





Inhibitor]     


 


Cotton material Allergy  itching, Uncoded 09/23/24 13:40





   hives  














Physical Exam


Osteopathic Statement: *.  No significant issues noted on an osteopathic 

structural exam other than those noted in the History and Physical/Consult.


Vitals: 


                                   Vital Signs











  Temp Pulse Resp BP Pulse Ox


 


 09/23/24 08:55  99.1 F  77  20  150/75  99


 


 09/23/24 07:46  99.2 F    


 


 09/23/24 07:03  99.8 F H  72  18  149/71  99








                                Intake and Output











 09/23/24 09/23/24 09/23/24





 06:59 14:59 22:59


 


Other:   


 


  Weight  97.069 kg 














Results


CBC & Chem 7: 


                                 09/23/24 09:00





                                 09/23/24 09:00


Labs: 


                  Abnormal Lab Results - Last 24 Hours (Table)











  09/23/24 09/23/24 Range/Units





  09:00 09:00 


 


RBC  2.70 L   (4.30-5.90)  m/uL


 


Hgb  9.7 L   (13.0-17.5)  gm/dL


 


Hct  29.0 L   (39.0-53.0)  %


 


MCV  107.4 H   (80.0-100.0)  fL


 


MCH  35.9 H   (25.0-35.0)  pg


 


Sodium   135 L  (137-145)  mmol/L


 


Chloride   111 H  ()  mmol/L


 


BUN   21 H  (9-20)  mg/dL


 


Glucose   105 H  (74-99)  mg/dL

## 2024-09-23 NOTE — XR
EXAMINATION TYPE: XR chest 2V

 

DATE OF EXAM: 9/23/2024

 

COMPARISON: 4/20/2023

 

INDICATION: Dyspnea, leg pain

 

TECHNIQUE:  Frontal and lateral views of the chest are obtained.

 

FINDINGS:  

The heart size is enlarged.  

The pulmonary vasculature is normal.

The lungs are clear.

 

IMPRESSION:  

1. No acute pulmonary process.

2. Cardiomegaly

 

[facility], Workstation: [id number], 9/23/2024 9:56 AM

## 2024-09-23 NOTE — CT
CT right foot.

 

HISTORY: Ankle pain

 

COMPARISON: None.

 

TECHNIQUE: Oblique axial images are obtained to the right foot.

 

FINDINGS:

 

There is no acute fracture or dislocation. There is no cortical disruption or periosteal reaction.

 

There is marked osteopenia of the distal right tibia and fibula. There are postsurgical changes of pr
evious screw placement within the tibia and fibula and there is partial fusion of the distal fibula w
ith the tibia.

 

There are mild degenerative changes of the talonavicular articulation where there is mild hypertrophi
c spurring but no significant joint space narrowing.

 

IMPRESSION:

1. No acute fracture of the right foot.

2. Remote trauma and prior fusion of the right ankle joint as described above.

3. Diffuse osteopenia of the distal right tibia and fibula.

 

[facility], Workstation: [id number], 9/23/2024 6:22 PM

## 2024-09-23 NOTE — US
EXAMINATION TYPE: US venous doppler duplex LE RT

 

DATE OF EXAM: 9/23/2024 8:25 AM

 

COMPARISON: NONE

 

CLINICAL INDICATION: Male, 79 years old with history of swelling; Groin pain x 3 weeks and foot pain 
x 1 day.  No redness.  On baby aspirin.  No injury.  Hx cellulitis. 

 

SIDE PERFORMED: Right  

 

TECHNIQUE:  The lower extremity deep venous system is examined utilizing real time linear array sonog
arlen with graded compression, doppler sonography and color-flow sonography.

 

VESSELS IMAGED:

Common Femoral Vein

Deep Femoral Vein

Greater Saphenous Vein *

Femoral Vein

Popliteal Vein

Small Saphenous Vein *

Proximal Calf Veins

(* superficial vessels)

 

 

 

Right Leg:  Negative for DVT

 

 

IMPRESSION: 

1. Right lower extremity ultrasound negative for deep venous thrombosis.

 

 

[facility], Workstation: [id number], 9/23/2024 8:35 AM

## 2024-09-23 NOTE — ED
Extremity Problem HPI





- General


Chief complaint: Extremity Problem,Nontraumatic


Stated complaint: Foot & Groin Pain


Time Seen by Provider: 09/23/24 07:41


Source: patient, family, RN notes reviewed, old records reviewed


Mode of arrival: wheelchair


Limitations: no limitations





- History of Present Illness


Initial comments: 


79-year-old male presented the ER with a chief complaint of right lower 

extremity pain.  Patient was seen by urgent care yesterday and started on Keflex

for concern of cellulitis.  Patient states he started to have worsening of 

symptoms including swelling and erythema to right lower extremity which brought 

him to the ER last night.  Patient had laboratory studies drawn which were 

negative for acute process.  X-rays were also negative.  Patient was instructed 

to continue taking Keflex as prescribed and follow-up with PCP.  Patient states 

since discharge from ER he has been having difficulty ambulating due to pain and

increase in pain to his right groin.  No new injuries or traumas.  Wife, at 

bedside, states patient had a fever of 100.4 last night.  He has been taking 

over-the-counter Tylenol for symptom control.  Patient denies headache, cough, 

congestion, shortness of breath, chest pain, abdominal pain, constipation/diar

wen.  Patient does state he currently has UTI and states he self catheterizes 

himself. No history of DVT or blood thinner use. No new urinary complaints. 





- Related Data


                                Home Medications











 Medication  Instructions  Recorded  Confirmed


 


Ascorbic Acid [Vitamin C] 1,000 mg PO DAILY 08/21/19 06/28/23


 


Aspirin 81 mg PO DAILY 08/21/19 06/28/23


 


Losartan [Cozaar] 50 mg PO DAILY 08/21/19 06/28/23


 


Metoprolol Tartrate [Lopressor] 25 mg PO BID 08/21/19 06/28/23


 


Nitroglycerin Sl Tabs [Nitrostat] 0.4 mg SL Q5M PRN 08/21/19 06/28/23


 


Omeprazole [PriLOSEC] 40 mg PO HS 08/21/19 06/28/23


 


Ubidecarenone [Co Q-10] 100 mg PO DAILY 08/21/19 06/28/23


 


Docusate [Colace] 100 mg PO BID 09/02/21 06/28/23


 


Albuterol Inhaler [Ventolin Hfa 1 - 2 puff INHALATION RT-QID PRN 12/07/22 06/28/23





Inhaler]   


 


Fluticasone/Vilanterol [Breo 1 puff INHALATION RT-QID PRN 12/07/22 06/28/23





Ellipta 200-25 Mcg Inhaler]   


 


Cranberry Fruit [Cranberry] 465 mg PO DAILY 12/12/22 06/28/23


 


Furosemide [Lasix] 40 mg PO DAILY PRN 12/12/22 06/28/23


 


Quercetin 800 mg PO BID 12/12/22 06/28/23


 


Zinc Gluconate [Zinc] 50 mg PO DAILY 12/12/22 06/28/23


 


Centrum Cardio 2 tab PO DAILY 12/28/22 06/28/23


 


Cholecalciferol [Vitamin D3 (125 125 mcg PO DAILY 04/20/23 06/28/23





Mcg = 5000 Iu)]   


 


Clopidogrel [Plavix] 75 mg PO DAILY 04/20/23 06/28/23


 


Dasatinib [Sprycel] 100 mg PO DAILY 04/20/23 06/28/23


 


Prosymbiotic 1 tab PO BID 04/20/23 06/28/23


 


Sennosides/Docusate Sodium 1 tab PO BID 04/20/23 06/28/23





[Senna-S 8.6-50 mg Tablet]   


 


Tamsulosin HCl [Flomax] 0.4 mg PO DAILY 04/20/23 06/28/23


 


valACYclovir HCL [Valtrex] 1,000 mg PO TID 04/20/23 06/28/23


 


Alfuzosin HCl [Alfuzosin HCl ER] 10 mg PO DAILY 06/28/23 06/28/23








                                  Previous Rx's











 Medication  Instructions  Recorded


 


Ciprofloxacin HCl [Cipro] 500 mg PO Q12HR 14 Days #28 tab 06/30/23


 


HYDROcodone/APAP 5-325MG [Norco 1 - 2 tab PO Q6HR PRN #18 tab 06/30/23





5-325]  











                                    Allergies











Allergy/AdvReac Type Severity Reaction Status Date / Time


 


adhesive tape Allergy  Rash/Hives,"paper Verified 09/23/24 07:04





   tape is ok"  


 


amoxicillin [From Augmentin] Allergy  Rash/Hives Verified 09/23/24 07:04


 


atorvastatin [From Lipitor] Allergy  Muscle Verified 09/23/24 07:04





   Aches  


 


budesonide [From Symbicort] Allergy  Vision Verified 09/23/24 07:04





   issues  


 


ciprofloxacin Allergy  Muscle Verified 09/23/24 07:04





   aches  


 


clavulanic acid Allergy  Rash/Hives Verified 09/23/24 07:04





[From Augmentin]     


 


mold Allergy  Rash/Hives Verified 09/23/24 07:04


 


doxycycline AdvReac  Rectal Verified 09/23/24 07:04





   Bleeding  


 


enalapril AdvReac  Cough Verified 09/23/24 07:04


 


ezetimibe [From Vytorin] AdvReac  Muscle Verified 09/23/24 07:04





   Aches  


 


fluticasone AdvReac  Irregular Verified 09/23/24 07:04





[From Advair Diskus]   heart rate  


 


formoterol [From Symbicort] AdvReac  Vision Verified 09/23/24 07:04





   issues  


 


nilotinib [From Tasigna] AdvReac  Pancreatiti Verified 09/23/24 07:04





   s  


 


salmeterol AdvReac  Irregular Verified 09/23/24 07:04





[From Advair Diskus]   heart rate  


 


simvastatin [From Vytorin] AdvReac  Muscle Verified 09/23/24 07:04





   Aches  


 


Statins-HMG-CoA Reductase AdvReac  Muscle Verified 09/23/24 07:04





Inhibitor   aches  





[Statins-Hmg-Coa Reductase     





Inhibitor]     


 


Cotton material Allergy  itching, Uncoded 09/23/24 07:04





   hives  














Review of Systems


ROS Statement: 


Those systems with pertinent positive or pertinent negative responses have been 

documented in the HPI.





ROS Other: All systems not noted in ROS Statement are negative.





Past Medical History


Past Medical History: Coronary Artery Disease (CAD), Cancer, GERD/Reflux, 

Hypertension, Skin Disorder


Additional Past Medical History / Comment(s): Hx. of CML, Hx. of Shingles L eye,

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


History of Any Multi-Drug Resistant Organisms: None Reported


Past Surgical History: Cholecystectomy, Heart Catheterization With Stent, Hernia

Repair, Orthopedic Surgery


Additional Past Surgical History / Comment(s): Thyroid surgery,Vasectomy, R 

ankle surgery, Sinus surgery, Basal cell R shoulder, Pyloroplasty & Vagotomy, 

Hemorrhoidectomy, Cataract surgery & bx. of inner thigh.aortic valve 2/24/23


Past Anesthesia/Blood Transfusion Reactions: No Reported Reaction


Date of Last Stent Placement:: 2006


Past Psychological History: No Psychological Hx Reported


Smoking Status: Former smoker


Past Alcohol Use History: None Reported


Past Drug Use History: None Reported





- Past Family History


  ** Mother


Family Medical History: Cancer, Pulmonary Embolus





  ** Father


Family Medical History: Cancer





  ** Brother(s)


Family Medical History: Cancer





  ** Son(s)


Family Medical History: Cancer





General Exam


Limitations: no limitations


General appearance: alert, in no apparent distress


Respiratory exam: Present: normal lung sounds bilaterally.  Absent: respiratory 

distress, wheezes, rales, rhonchi, stridor


Cardiovascular Exam: Present: regular rate, normal rhythm, normal heart sounds. 

Absent: systolic murmur, diastolic murmur, rubs, gallop, clicks


Extremities exam: Present: tenderness (Right ankle and calf.  2+ pitting edema 

right lower extremity.  No overlying erythema.  Patient has full active range of

motion.  Healed surgical incision to anterior aspect. Sensation intact.)


Neurological exam: Present: alert, oriented X3, CN II-XII intact


Skin exam: Present: warm, dry, intact, normal color.  Absent: rash





Course


                                   Vital Signs











  09/23/24 09/23/24 09/23/24





  07:03 07:46 08:55


 


Temperature 99.8 F H 99.2 F 99.1 F


 


Pulse Rate 72  77


 


Respiratory 18  20





Rate   


 


Blood Pressure 149/71  150/75


 


O2 Sat by Pulse 99  99





Oximetry   














- Reevaluation(s)


Reevaluation #1: 





09/23/24 11:51


Case discussed with sound physician, Dr. Rodriguez for admission.





Medical Decision Making





- Medical Decision Making





Was pt. sent in by a medical professional or institution (, PA, NP, urgent 

care, hospital, or nursing home...) When possible be specific


@  -No


Did you speak to anyone other than the patient for history (EMS, parent, family,

police, friend...)? What history was obtained from this source 


@  -Wife, at bedside, aiding in HPI and past medical history.


Did you review nursing and triage notes (agree or disagree)?  Why? 


@  -I reviewed and agree with nursing and triage notes


Were old charts reviewed (outside hosp., previous admission, EMS record, old 

EKG, old radiological studies, urgent care reports/EKG's, nursing home records)?

Report findings 


@  -Yes I reviewed ER visit from 9-.  Patient seen here for similar 

complaint.  Laboratory studies obtained unremarkable.  X-ray negative.  Patient 

discharged and told to continue taking Keflex as prescribed.


Differential Diagnosis (chest pain, altered mental status, abdominal pain women,

abdominal pain men, vaginal bleeding, weakness, fever, dyspnea, syncope, 

headache, dizziness, GI bleed, back pain, seizure, CVA, palpatations, mental 

health, musculoskeletal)? 


@  -Differential Musculoskeletal


Muscular strain, contusion, ligament sprain, fracture, arthritis, septic 

arthritis, bursitis, cellulitis, muscle spasm, nerve compression, DVT, arterial 

occlusion, herpes zoster, electrolyte abnormality, tumor.... This is not meant 

to be in all inclusive list


EKG interpreted by me (3pts min.).


@  -None done


X-rays interpreted by me (1pt min.).


@  -Chest x-ray interpreted by me negative for acute cardiopulmonary process.


CT interpreted by me (1pt min.).


@  -None done


U/S interpreted by me (1pt. min.).


@  -Ultrasound venous Doppler right lower extremity negative for acute evidence 

of DVT.


What testing was considered but not performed or refused? (CT, X-rays, U/S, 

labs)? Why?


@  -None


What meds were considered but not given or refused? Why?


@  -None


Did you discuss the management of the patient with other professionals 

(professionals i.e. , PA, NP, lab, RT, psych nurse, , , 

teacher, , )? Give summary


@  -Case discussed with sound physician, Dr. Rodriguez for admission. 


Was smoking cessation discussed for >3mins.?


@  -No


Was critical care preformed (if so, how long)?


@  -No


Were there social determinants of health that impacted care today? How? 

(Homelessness, low income, unemployed, alcoholism, drug addiction, 

transportation, low edu. Level, literacy, decrease access to med. care, long-term, 

rehab)?


@  -No


Was there de-escalation of care discussed even if they declined (Discuss DNR or 

withdrawal of care, Hospice)? DNR status


@  -No


What co-morbidities impacted this encounter? (DM, HTN, Smoking, COPD, CAD, 

Cancer, CVA, ARF, Chemo, Hep., AIDS, mental health diagnosis, sleep apnea, 

morbid obesity)?


@  -CML


Was patient admitted / discharged? Hospital course, mention meds given and 

route, prescriptions, significant lab abnormalities, going to OR and other 

pertinent info.


@  -Admitted.  79-year-old male presented to ER with a chief complaint of right 

foot pain and swelling.  History and physical exam completed.  Vitals within 

normal limits.  Patient had no signs of acute distress and resting comfortably 

in exam room. Exam remarkable for 2+ pitting edema to pretibial right.  2+ right

dorsalis pedis pulses.  No overlying skin changes or wounds.  Ultrasound 

initially obtained to rule out DVT which is negative.  Laboratory studies 

obtained to rule out heart failure exacerbation.  CBC showing a macrocytic 

hyperchromic anemia hemoglobin 9.7 which is chronic in nature.  CMP showing 

sodium 135, potassium 4.3, chloride 111, carbon dioxide 23.  BNP 1120.  Chest x-

ray interpreted by me negative for acute cardiopulmonary process.  Upon 

reevaluation, patient resting comfortably in exam room no signs of acute 

distress.  Results discussed with patient and wife, at bedside.  All questions 

answered.  Admission considered for heart failure workup and intractable pain.  

Case discussed with Sound physician, Dr. Rodriguez for admission.  Patient 

agreeable for admission.  Patient admitted in stable condition for further 

evaluation and treatment. Case discussed with ED attending, Dr. Solis. 


Undiagnosed new problem with uncertain prognosis?


@  -No


Drug Therapy requiring intensive monitoring for toxicity (Heparin, Nitro, 

Insulin, Cardizem)?


@  -No


Were any procedures done?


@  -No


Diagnosis/symptom?


@  -Right lower extremity edema/CML/elevated BNP


Acute, or Chronic, or Acute on Chronic?


@  -Acute


Uncomplicated (without systemic symptoms) or Complicated (systemic symptoms)?


@  -Complicated


Side effects of treatment?


@  -No


Exacerbation, Progression, or Severe Exacerbation?


@  -No


Poses a threat to life or bodily function? How? (Chest pain, USA, MI, pneumonia,

PE, COPD, DKA, ARF, appy, cholecystitis, CVA, Diverticulitis, Homicidal, 

Suicidal, threat to staff... and all critical care pts)


@  -Yes, fluid retention can lead to hypoxia which is life-threatening.








- Lab Data


Result diagrams: 


                                 09/23/24 09:00





                                 09/23/24 09:00


                                   Lab Results











  09/23/24 09/23/24 Range/Units





  09:00 09:00 


 


WBC  9.5   (3.8-10.6)  k/uL


 


RBC  2.70 L   (4.30-5.90)  m/uL


 


Hgb  9.7 L   (13.0-17.5)  gm/dL


 


Hct  29.0 L   (39.0-53.0)  %


 


MCV  107.4 H   (80.0-100.0)  fL


 


MCH  35.9 H   (25.0-35.0)  pg


 


MCHC  33.4   (31.0-37.0)  g/dL


 


RDW  13.7   (11.5-15.5)  %


 


Plt Count  233   (150-450)  k/uL


 


MPV  7.6   


 


Neutrophils %  52   %


 


Lymphocytes %  40   %


 


Monocytes %  6   %


 


Eosinophils %  0   %


 


Basophils %  0   %


 


Neutrophils #  5.0   (1.3-7.7)  k/uL


 


Lymphocytes #  3.8   (1.0-4.8)  k/uL


 


Monocytes #  0.6   (0-1.0)  k/uL


 


Eosinophils #  0.0   (0-0.7)  k/uL


 


Basophils #  0.0   (0-0.2)  k/uL


 


Macrocytosis  Moderate   


 


Sodium   135 L  (137-145)  mmol/L


 


Potassium   4.3  (3.5-5.1)  mmol/L


 


Chloride   111 H  ()  mmol/L


 


Carbon Dioxide   23  (22-30)  mmol/L


 


Anion Gap   1  mmol/L


 


BUN   21 H  (9-20)  mg/dL


 


Creatinine   1.13  (0.66-1.25)  mg/dL


 


Est GFR (CKD-EPI)AfAm   71  (>60 ml/min/1.73 sqM)  


 


Est GFR (CKD-EPI)NonAf   62  (>60 ml/min/1.73 sqM)  


 


Glucose   105 H  (74-99)  mg/dL


 


Calcium   9.3  (8.4-10.2)  mg/dL


 


Total Bilirubin   0.7  (0.2-1.3)  mg/dL


 


AST   30  (17-59)  U/L


 


ALT   18  (4-49)  U/L


 


Alkaline Phosphatase   62  ()  U/L


 


NT-Pro-B Natriuret Pep   1120  pg/mL


 


Total Protein   6.7  (6.3-8.2)  g/dL


 


Albumin   4.3  (3.5-5.0)  g/dL














- Radiology Data


Radiology results: report reviewed, image reviewed





Disposition


Clinical Impression: 


 Immunocompromised state, Edema of right lower extremity, Elevated brain 

natriuretic peptide (BNP) level





Disposition: ADMITTED AS IP TO THIS [facility]


Condition: Stable


Time of Disposition: 11:51

## 2024-09-24 VITALS — RESPIRATION RATE: 18 BRPM

## 2024-09-24 LAB
ANION GAP SERPL CALC-SCNC: 12.9 MMOL/L (ref 4–12)
BASOPHILS # BLD AUTO: 0.05 X 10*3/UL (ref 0–0.1)
BASOPHILS NFR BLD AUTO: 0.8 %
BUN SERPL-SCNC: 18.4 MG/DL (ref 9–27)
BUN/CREAT SERPL: 16.73 RATIO (ref 12–20)
CALCIUM SPEC-MCNC: 8.9 MG/DL (ref 8.7–10.3)
CHLORIDE SERPL-SCNC: 107 MMOL/L (ref 96–109)
CO2 SERPL-SCNC: 22.1 MMOL/L (ref 21.6–31.8)
EOSINOPHIL # BLD AUTO: 0.07 X 10*3/UL (ref 0.04–0.35)
EOSINOPHIL NFR BLD AUTO: 1.1 %
ERYTHROCYTE [DISTWIDTH] IN BLOOD BY AUTOMATED COUNT: 2.49 X 10*6/UL (ref 4.4–5.6)
ERYTHROCYTE [DISTWIDTH] IN BLOOD: 13.9 % (ref 11.5–14.5)
GLUCOSE SERPL-MCNC: 115 MG/DL (ref 70–110)
HCT VFR BLD AUTO: 26.9 % (ref 39.6–50)
HGB BLD-MCNC: 9.1 G/DL (ref 13–17)
IMM GRANULOCYTES BLD QL AUTO: 0.3 %
LYMPHOCYTES # SPEC AUTO: 2.4 X 10*3/UL (ref 0.9–5)
LYMPHOCYTES NFR SPEC AUTO: 38.3 %
MCH RBC QN AUTO: 36.5 PG (ref 27–32)
MCHC RBC AUTO-ENTMCNC: 33.8 G/DL (ref 32–37)
MCV RBC AUTO: 108 FL (ref 80–97)
MONOCYTES # BLD AUTO: 0.7 X 10*3/UL (ref 0.2–1)
MONOCYTES NFR BLD AUTO: 11.2 %
NEUTROPHILS # BLD AUTO: 3.03 X 10*3/UL (ref 1.8–7.7)
NEUTROPHILS NFR BLD AUTO: 48.3 %
NRBC BLD AUTO-RTO: 0 X 10*3/UL (ref 0–0.01)
PLATELET # BLD AUTO: 208 X 10*3/UL (ref 140–440)
POTASSIUM SERPL-SCNC: 3.7 MMOL/L (ref 3.5–5.5)
SODIUM SERPL-SCNC: 142 MMOL/L (ref 135–145)
WBC # BLD AUTO: 6.27 X 10*3/UL (ref 4.5–10)

## 2024-09-24 RX ADMIN — ACETAMINOPHEN PRN MG: 325 TABLET, FILM COATED ORAL at 07:57

## 2024-09-24 RX ADMIN — COLCHICINE ONE MG: 0.6 TABLET, FILM COATED ORAL at 13:42

## 2024-09-24 RX ADMIN — ASPIRIN SCH: 325 TABLET ORAL at 03:23

## 2024-09-24 RX ADMIN — COLCHICINE SCH MG: 0.6 TABLET, FILM COATED ORAL at 10:54

## 2024-09-24 NOTE — P.PN
Subjective


Progress Note Date: 09/24/24


Bryant Yang 79-year-old male presented the right lower extremity pain.  

Patient was seen by urgent care yesterday and started on Keflex for concern of 

cellulitis. Patient states he started to have worsening of symptoms including 

swelling and erythema to right lower extremity which brought him to the ER last 

night.  Patient had laboratory studies drawn which were negative for acute 

process.  Patient was instructed to continue taking Keflex as prescribed and 

follow-up with PCP.  He states since discharge from ER he has been having 

difficulty ambulating due to pain in his right foot and increase in pain to his 

right groin.  No new injuries or traumas.  He attest to fever overnight of 

100.4.  He has been taking over-the-counter Tylenol for symptom control.  

Patient denies chest pain, palpitations, abdominal pain, nausea, vomiting, 

constipation/diarrhea.  Patient does state he currently has UTI and states he 

self catheterizes himself. No history of DVT or blood thinner use. No new 

urinary complaints. 


 


Initial lab work done in the ER showed WBC 9.5, hemoglobin 9.7, platelets 233, 

sodium 135, potassium 4.3, chloride 111, BUN 21, creatinine 1.13, glucose 105.





Chest x-ray done in the ER with cardiomegaly


Venous Doppler study of the right leg with no DVT


Right foot CT with no acute fracture of right foot


 


Patient admitted to internal medicine service








Progress note 9/24/2024


Patient seen and examined at bedside.  No events overnight patient was afebrile.

 Patient stated continued pain in the right big toe and he stated that the 

swelling seems the same as yesterday.  Fluid balance -2000, white blood cells 

are within normal limits, hemoglobin 9.1 .  CT right foot unremarkable.  

Pending echo, urinalysis. patient has no other complaints at this time.





 


PHYSICAL EXAMINATION: 


 


GENERAL: The patient is alert and oriented x3, not in any acute distress. Well 

developed, well nourished. 


HEENT: Pupils are round and equally reacting to light. EOMI. No scleral icterus.

No conjunctival pallor. Normocephalic, atraumatic. No pharyngeal erythema. No 

thyromegaly. 


CARDIOVASCULAR: S1 and S2 present. No murmurs, rubs, or gallops. 


PULMONARY: Chest is clear to auscultation, no wheezing or crackles. 


ABDOMEN: Soft, nontender, nondistended, normoactive bowel sounds. No palpable 

organomegaly.  Suprapubic tenderness.


MUSCULOSKELETAL: No joint swelling or deformity.


EXTREMITIES: Right big toe showed warmth swelling and tenderness to touch. No 

cyanosis, clubbing, 2+ pedal edema.  


NEUROLOGICAL: Gross neurological examination did not reveal any focal deficits. 


SKIN: No rashes. 


 


Assessment and plan


#Bilateral lower extremity edema


 Ultrasound and CT of foot, no DVT, no acute fracture


 Echo pending


 Increased dose of Lasix 40 mg IV push twice daily


 Monitor BMP





#Acute Gout flare


Patient with inflamed, warm right big toe


 CRP 5.80


 begin colchicine 1.2mg once, then 0.6mg BID until 2 days after flare has r

esolved


 Plan outpatient follow-up with PCP to begin allopurinol, anticipate discharge 

tomorrow





#UTI, complicated, recurrent


 Diagnosed outpatient and started on abx, will continue for total 7 days 

between IV and PO


 Urinalysis pending


 continue antibiotics day 2 inpatient





#Macrocytic anemia


 Hemoglobin 9.1, .0


 Labs B12 and folate ordered


 Pending result consider vitamin supplementation


 


Chronic Medical Conditions


-Will continue home meds


 


Monitor vital signs


Monitor CBC


Monitor CMP


Labs and medication were reviewed..   Continue with symptomatic treatment.  

Resume home medication.  Monitor labs and vitals.  DVT prophylaxis.  Further 

recommendations as per clinical course of the patient


 


Dictation was produced using dragon dictation software. please excuse any 

grammatical, word or spelling errors.





I saw and evaluated the patient during the key and critical portions of this 

encounter, and discussed the case in detail with the resident author of this 

note, I agree with the Assessment and Plan, and my changes, if any, are 

highlighted in blue.








Objective





- Vital Signs


Vital signs: 


                                   Vital Signs











Temp  98.9 F   09/24/24 07:00


 


Pulse  66   09/24/24 08:00


 


Resp  16   09/24/24 08:00


 


BP  150/76   09/24/24 07:00


 


Pulse Ox  100   09/24/24 07:00


 


FiO2      








                                 Intake & Output











 09/23/24 09/24/24 09/24/24





 18:59 06:59 18:59


 


Intake Total  240 240


 


Output Total  300 300


 


Balance  -60 -60


 


Weight 97.069 kg 97.069 kg 


 


Intake:   


 


  Oral  240 240


 


Output:   


 


  Urine  300 300


 


Other:   


 


  Voiding Method  Indwelling Catheter Indwelling Catheter














- Labs


CBC & Chem 7: 


                                 09/24/24 02:49





                                 09/24/24 02:49


Labs: 


                  Abnormal Lab Results - Last 24 Hours (Table)











  09/24/24 09/24/24 Range/Units





  02:49 02:49 


 


RBC   2.49 L  (4.40-5.60)  X 10*6/uL


 


Hgb   9.1 L  (13.0-17.0)  g/dL


 


Hct   26.9 L  (39.6-50.0)  %


 


MCV   108.0 H  (80.0-97.0)  FL


 


MCH   36.5 H  (27.0-32.0)  pg


 


MPV   9.4 L  (9.5-12.2)  FL


 


Anion Gap  12.90 H   (4.00-12.00)  mmol/L


 


Glucose  115 H   ()  mg/dL


 


C-Reactive Protein  5.80 H   (0.00-0.80)  mg/dL

## 2024-09-25 VITALS — SYSTOLIC BLOOD PRESSURE: 128 MMHG | TEMPERATURE: 98 F | DIASTOLIC BLOOD PRESSURE: 64 MMHG | HEART RATE: 68 BPM

## 2024-09-25 LAB
ANION GAP SERPL CALC-SCNC: 12.2 MMOL/L (ref 4–12)
BASOPHILS # BLD MANUAL: 0.2 X 10*3/UL (ref 0–0.1)
BUN SERPL-SCNC: 27.6 MG/DL (ref 9–27)
BUN/CREAT SERPL: 18.4 RATIO (ref 12–20)
CALCIUM SPEC-MCNC: 8.6 MG/DL (ref 8.7–10.3)
CHLORIDE SERPL-SCNC: 105 MMOL/L (ref 96–109)
CO2 SERPL-SCNC: 21.8 MMOL/L (ref 21.6–31.8)
EOSINOPHIL # BLD MANUAL: 0.1 X 10*3/UL (ref 0.04–0.35)
ERYTHROCYTE [DISTWIDTH] IN BLOOD BY AUTOMATED COUNT: 2.45 X 10*6/UL (ref 4.4–5.6)
ERYTHROCYTE [DISTWIDTH] IN BLOOD: 13.6 % (ref 11.5–14.5)
GLUCOSE SERPL-MCNC: 107 MG/DL (ref 70–110)
HCT VFR BLD AUTO: 26.3 % (ref 39.6–50)
HGB BLD-MCNC: 8.9 G/DL (ref 13–17)
LYMPHOCYTES # BLD MANUAL: 5.38 X 10*3/UL (ref 0.9–5)
MCH RBC QN AUTO: 36.3 PG (ref 27–32)
MCHC RBC AUTO-ENTMCNC: 33.8 G/DL (ref 32–37)
MCV RBC AUTO: 107.3 FL (ref 80–97)
MONOCYTES # BLD MANUAL: 0.6 X 10*3/UL (ref 0.2–1)
NEUTROPHILS NFR BLD MANUAL: 37 %
NEUTS SEG # BLD MANUAL: 3.69 X 10*3/UL (ref 1.8–7.7)
NRBC BLD AUTO-RTO: 0.05 X 10*3/UL (ref 0–0.01)
PLATELET # BLD AUTO: 179 X 10*3/UL (ref 140–440)
POTASSIUM SERPL-SCNC: 3.6 MMOL/L (ref 3.5–5.5)
SODIUM SERPL-SCNC: 139 MMOL/L (ref 135–145)
VIT B12 SERPL-MCNC: 345 PG/ML (ref 200–944)
WBC # BLD AUTO: 9.96 X 10*3/UL (ref 4.5–10)

## 2024-09-25 RX ADMIN — COLCHICINE SCH MG: 0.6 TABLET, FILM COATED ORAL at 08:27

## 2024-09-25 NOTE — P.DS
Providers


Date of admission: 


09/23/24 11:25





Expected date of discharge: 09/25/24


Attending physician: 


Kellie Rodriguez MD





Primary care physician: 


Mumtaz Roland





[facility] Course: 


Discharge diagnoses;


#Bilateral lower extremity edema, likely venous insufficiency


#Acute Gout flare


#UTI, complicated, recurrent


Chronic urinary retention


#Macrocytic anemia


History of CML


Hypertension





Hospital course;





Bryant Yang 79-year-old male presented the right lower extremity pain.  

Patient was seen by urgent care yesterday and started on Keflex for concern of 

cellulitis. Patient states he started to have worsening of symptoms including 

swelling and erythema to right lower extremity which brought him to the ER last 

night.  Patient had laboratory studies drawn which were negative for acute 

process.  Patient was instructed to continue taking Keflex as prescribed and 

follow-up with PCP.  He states since discharge from ER he has been having 

difficulty ambulating due to pain in his right foot and increase in pain to his 

right groin.  No new injuries or traumas.  He attest to fever overnight of 

100.4.  He has been taking over-the-counter Tylenol for symptom control.  

Patient denies chest pain, palpitations, abdominal pain, nausea, vomiting, 

constipation/diarrhea.  Patient does state he currently has UTI and states he 

self catheterizes himself. No history of DVT or blood thinner use. No new 

urinary complaints. 


Initial lab work done in the ER showed WBC 9.5, hemoglobin 9.7, platelets 233, 

sodium 135, potassium 4.3, chloride 111, BUN 21, creatinine 1.13, glucose 105.


Chest x-ray done in the ER with cardiomegaly, Venous Doppler study of the right 

leg with no DVT, Right foot CT with no acute fracture of right foot.  

Echocardiogram normal left ventricular systolic dysfunction estimated ejection 

fraction 55 to 60%








During hospital stay patient was found to have bilateral lower extremity edema 

and was started on Lasix, and had 2 L deficit.  After edema was reduced it was 

apparent that patient had acute gout flare, CRP 5.80, and began treatment with 

colchicine.  He was diagnosed outpatient with a UTI and continued his antibiotic

regiment.  Also, diagnosed with macrocytic anemia, Hemoglobin 9.1, .0.





Patient to be discharged in stable condition. Continue Lasix 40 mg as needed.  

Discharged with colchicine 0.6mg BID until 2 days after flare has resolve. Plan 

outpatient follow-up with PCP to begin long-term treatment of gout with 

allopurinol, and follow-up of macrocytic anemia.  Consider discontinuing oral 

Lasix outpatient, as it can increase risk for gout flare.








GENERAL: The patient is alert, and follow-up of macrocytic anemia and oriented 

x3, not in any acute distress. Well developed, well nourished. 


HEENT: Pupils are round and equally reacting to light. EOMI. No scleral icterus.

No conjunctival pallor. Normocephalic, atraumatic. No pharyngeal erythema. No 

thyromegaly. 


CARDIOVASCULAR: S1 and S2 present. No murmurs, rubs, or gallops. 


PULMONARY: Chest is clear to auscultation, no wheezing or crackles. 


ABDOMEN: Soft, nontender, nondistended, normoactive bowel sounds. No palpable 

organomegaly. Suprapubic tenderness.


MUSCULOSKELETAL: No joint swelling or deformity.


EXTREMITIES: Right big toe showed warmth swelling and tenderness to touch. No 

cyanosis, clubbing, 2+ pedal edema.  


NEUROLOGICAL: Gross neurological examination did not reveal any focal deficits. 


SKIN: No rashes. 





Dictation was produced using dragon dictation software. please excuse any 

grammatical, word or spelling errors.











A total of 33 minutes of time were spent preparing this complex discharge 

summary.


Patient was discharged on 9/25/2024 at 953.


I have seen and evaluated the patient today.  Discussed with the resident and 

agree with the residents finding and plan as documented in the resident's note. 

Changes highlighted in blue font.


Patient Condition at Discharge: Stable





Plan - Discharge Summary


New Discharge Prescriptions: 


New


   Colchicine [Colcrys] 0.6 mg PO BID #14 each





Continue


   Nitroglycerin Sl Tabs [Nitrostat] 0.4 mg SL Q5M PRN


     PRN Reason: Pain


   Metoprolol Tartrate [Lopressor] 25 mg PO BID


   Losartan [Cozaar] 75 mg PO DAILY


   Aspirin 81 mg PO DAILY


   Ubidecarenone [Co Q-10] 100 mg PO DAILY


   Ascorbic Acid [Vitamin C] 1,000 mg PO DAILY


   Dasatinib [Sprycel] 100 mg PO DAILY@0300


   Cephalexin [Keflex] 500 mg PO TID


   Turmeric Root Extract [Turmeric] 500 mg PO DAILY


   L.acidoph,Paracasei, B.lactis [Probiotic] 1 cap PO TID


   valACYclovir HCL [Valtrex] 1,000 mg PO TID


   Docusate [Colace] 100 mg PO BID


   Albuterol Inhaler [Ventolin Hfa Inhaler] 1 - 2 puff INHALATION RT-QID PRN


     PRN Reason: Shortness Of Breath


   Fluticasone/Vilanterol [Breo Ellipta 200-25 Mcg Inhaler] 1 puff INHALATION 

RT-QID PRN


     PRN Reason: Shortness Of Breath


   Quercetin 800 mg PO BID


   Zinc Gluconate [Zinc] 50 mg PO DAILY


   Cranberry Fruit [Cranberry] 465 mg PO DAILY


   Furosemide [Lasix] 40 mg PO DAILY PRN


     PRN Reason: fluid retention


   Cholecalciferol [Vitamin D3 (125 Mcg = 5000 Iu)] 125 mcg PO DAILY


   Multivitamin/Iron/Folic Acid [Centrum Adults Tablet] 1 tab PO DAILY


   Cetirizine HCl [Zyrtec] 10 mg PO DAILY


Discharge Medication List





Ascorbic Acid [Vitamin C] 1,000 mg PO DAILY 08/21/19 [History]


Aspirin 81 mg PO DAILY 08/21/19 [History]


Losartan [Cozaar] 75 mg PO DAILY 08/21/19 [History]


Metoprolol Tartrate [Lopressor] 25 mg PO BID 08/21/19 [History]


Nitroglycerin Sl Tabs [Nitrostat] 0.4 mg SL Q5M PRN 08/21/19 [History]


Ubidecarenone [Co Q-10] 100 mg PO DAILY 08/21/19 [History]


Docusate [Colace] 100 mg PO BID 09/02/21 [History]


Albuterol Inhaler [Ventolin Hfa Inhaler] 1 - 2 puff INHALATION RT-QID PRN 

12/07/22 [History]


Fluticasone/Vilanterol [Breo Ellipta 200-25 Mcg Inhaler] 1 puff INHALATION RT-

QID PRN 12/07/22 [History]


Cranberry Fruit [Cranberry] 465 mg PO DAILY 12/12/22 [History]


Furosemide [Lasix] 40 mg PO DAILY PRN 12/12/22 [History]


Quercetin 800 mg PO BID 12/12/22 [History]


Zinc Gluconate [Zinc] 50 mg PO DAILY 12/12/22 [History]


Cholecalciferol [Vitamin D3 (125 Mcg = 5000 Iu)] 125 mcg PO DAILY 04/20/23 

[History]


Dasatinib [Sprycel] 100 mg PO DAILY@0300 04/20/23 [History]


Cephalexin [Keflex] 500 mg PO TID 09/23/24 [History]


Cetirizine HCl [Zyrtec] 10 mg PO DAILY 09/23/24 [History]


L.acidoph,Paracasei, B.lactis [Probiotic] 1 cap PO TID 09/23/24 [History]


Multivitamin/Iron/Folic Acid [Centrum Adults Tablet] 1 tab PO DAILY 09/23/24 

[History]


Turmeric Root Extract [Turmeric] 500 mg PO DAILY 09/23/24 [History]


valACYclovir HCL [Valtrex] 1,000 mg PO TID 09/23/24 [History]


Colchicine [Colcrys] 0.6 mg PO BID #14 each 09/25/24 [Rx]








Follow up Appointment(s)/Referral(s): 


Mumtaz Roland MD [Primary Care Provider] - 1-2 days


Patient Instructions/Handouts:  Low Purine Diet (DC), Gout (GEN)


Activity/Diet/Wound Care/Special Instructions: 


Please see your PCP. You may need to be started on gout medication outpatient. 

Continue colchicine till 24 hours after your flare resolves. 





Please also follow up with PCP with regards to low blood count. 


Discharge Disposition: HOME SELF-CARE

## 2024-09-25 NOTE — CA
Transthoracic Echo Report 

 Name: Bryant Yang 

 MRN:    [id number] 

 Age:    79     Gender:     M 

 

 :    1945 

 Exam Date:     2024 13:55 

 Exam Location: [facility] 

 Ht (in):     71     Wt (lb):     214 

 Ordering Physician:        Dion Gale MD 

 Attending/Referring Phys: 

 Technician         Tila العلي RDCS 

 Procedure CPT: 

 Indications:       Slurred speech, CVA 

 

 Cardiac Hx: 

 Technical Quality:      Fair 

 Contrast 1:                                Total Dose (mL): 

 Contrast 2:                                Total Dose (mL): 

 

 MEASUREMENTS  (Male / Female) Normal Values 

 2D ECHO 

 LV Diastolic Diameter PLAX        5.5 cm                4.2 - 5.9 / 3.9 - 5.3 cm 

 LV Systolic Diameter PLAX         2.7 cm                 

 IVS Diastolic Thickness           1.2 cm                0.6 - 1.0 / 0.6 - 0.9 cm 

 LVPW Diastolic Thickness          1.1 cm                0.6 - 1.0 / 0.6 - 0.9 cm 

 LV Relative Wall Thickness        0.4                    

 RV Internal Dim ED PLAX           5.1 cm                 

 LVOT Diameter                     1.7 cm                 

 LA Volume                         106.2 cm???             18 - 58 / 22 - 52 cm??? 

 LA Volume Index                   47.7 cm???/m???           16 - 28 cm???/m??? 

 

 DOPPLER 

 AV Peak Velocity                  261.4 cm/s             

 AV Peak Gradient                  27.3 mmHg              

 AV Mean Velocity                  175.5 cm/s             

 AV Mean Gradient                  13.7 mmHg              

 AV Velocity Time Integral         53.4 cm                

 LVOT Peak Velocity                155.6 cm/s             

 LVOT Peak Gradient                9.7 mmHg               

 LVOT Velocity Time Integral       35.7 cm                

 LVOT Stroke Volume                84.4 cm???               

 LVOT Stroke Volume Index          38.9 ml/m???             

 LVOT Cardiac Index                2839.9 cm???/min???m???      

 AV Area Cont Eq vti               1.6 cm???                

 AV Area Cont Eq pk                1.4 cm???                

 MV Area PHT                       3.1 cm???                

 Mitral E Point Velocity           98.4 cm/s              

 Mitral A Point Velocity           129.2 cm/s             

 Mitral E to A Ratio               0.8                    

 MV Deceleration Time              247.0 ms               

 MV E' Velocity                    10.4 cm/s              

 Mitral E to MV E' Ratio           9.4                    

 TR Peak Velocity                  249.3 cm/s             

 TR Peak Gradient                  24.9 mmHg              

 Right Ventricular Systolic Press  29.9 mmHg              

 

 

 FINDINGS 

 Left Ventricle 

 Mildly increased left ventricular wall thickness. Left ventricular cavity size  

 normal. Normal left ventricular systolic function with no obvious regional wall  

 motion abnormalities. Left ventricular ejection fraction is estimated at 55-60  

 %. 

 

 Right Ventricle 

 Normal right ventricular size and function. Right ventricular systolic pressure  

 within normal limits. 

 

 Right Atrium 

 Normal right atrial size. 

 

 Left Atrium 

 Severely increased left atrial volume. Moderately increased left atrial area. 

 

 Mitral Valve 

 Structurally normal mitral valve. Mitral annular calcification. Moderate mitral  

 regurgitation. Posteriorly directed mitral regurgitation jet. 

 

 Aortic Valve 

 Normally functioning  bioprosthetic aortic valve without stenosis with a peak  

 velocity of  2.6 m/s, peak gradient 27 mmHg, mean gradient 14 mmHg, and  

 estimated aortic valve area of 1.6 cm???. 

 

 Tricuspid Valve 

 Structurally normal tricuspid valve. Mild tricuspid regurgitation. 

 

 Pulmonic Valve 

 Structurally normal pulmonic valve. Trace pulmonic regurgitation. 

 

 Pericardium 

 Small pericardial effusion. 

 

 Aorta 

 Normal size aortic root and proximal ascending aorta. 

 

 CONCLUSIONS 

 Normal biventricular systolic function 

 Poorly visualized aortic valve.  Transcatheter aortic valve with a mean  

 gradient of 14 mmHg and no regurgitation 

 Small circumferential pericardial effusion 

 Previewed by:  

 Dr. Xavi Thomspon MD 

 (Electronically Signed) 

 Final Date:      2024 09:16

## 2024-11-12 ENCOUNTER — HOSPITAL ENCOUNTER (OUTPATIENT)
Dept: HOSPITAL 47 - LABWHC1 | Age: 79
Discharge: HOME | End: 2024-11-12
Attending: INTERNAL MEDICINE
Payer: MEDICARE

## 2024-11-12 DIAGNOSIS — I10: Primary | ICD-10-CM

## 2024-11-12 LAB
ANION GAP SERPL CALC-SCNC: 9.3 MMOL/L (ref 4–12)
BUN SERPL-SCNC: 26.4 MG/DL (ref 9–27)
BUN/CREAT SERPL: 20.31 RATIO (ref 12–20)
CALCIUM SPEC-MCNC: 9.2 MG/DL (ref 8.7–10.3)
CHLORIDE SERPL-SCNC: 109 MMOL/L (ref 96–109)
CO2 SERPL-SCNC: 22.7 MMOL/L (ref 21.6–31.8)
GLUCOSE SERPL-MCNC: 120 MG/DL (ref 70–110)
POTASSIUM SERPL-SCNC: 5.5 MMOL/L (ref 3.5–5.5)
SODIUM SERPL-SCNC: 141 MMOL/L (ref 135–145)

## 2024-11-12 PROCEDURE — 36415 COLL VENOUS BLD VENIPUNCTURE: CPT

## 2024-11-12 PROCEDURE — 80048 BASIC METABOLIC PNL TOTAL CA: CPT

## 2025-03-08 ENCOUNTER — HOSPITAL ENCOUNTER (EMERGENCY)
Dept: HOSPITAL 47 - EC | Age: 80
Discharge: HOME | End: 2025-03-08
Payer: MEDICARE

## 2025-03-08 VITALS
SYSTOLIC BLOOD PRESSURE: 140 MMHG | RESPIRATION RATE: 18 BRPM | DIASTOLIC BLOOD PRESSURE: 69 MMHG | HEART RATE: 95 BPM | TEMPERATURE: 97.8 F

## 2025-03-08 DIAGNOSIS — N39.0: Primary | ICD-10-CM

## 2025-03-08 DIAGNOSIS — I25.10: ICD-10-CM

## 2025-03-08 DIAGNOSIS — Z87.891: ICD-10-CM

## 2025-03-08 DIAGNOSIS — C92.12: ICD-10-CM

## 2025-03-08 LAB
ALBUMIN SERPL-MCNC: 4.2 G/DL (ref 3.5–5)
ALP SERPL-CCNC: 59 U/L (ref 38–126)
ALT SERPL-CCNC: 30 U/L (ref 4–49)
ANION GAP SERPL CALC-SCNC: 10 MMOL/L
APTT BLD: 22.8 SEC (ref 22–30)
AST SERPL-CCNC: 41 U/L (ref 17–59)
BASOPHILS # BLD AUTO: 0 K/UL (ref 0–0.2)
BASOPHILS NFR BLD AUTO: 0 %
BUN SERPL-SCNC: 28 MG/DL (ref 9–20)
CALCIUM SPEC-MCNC: 8.9 MG/DL (ref 8.4–10.2)
CHLORIDE SERPL-SCNC: 109 MMOL/L (ref 98–107)
CO2 SERPL-SCNC: 18 MMOL/L (ref 22–30)
EOSINOPHIL # BLD AUTO: 0.1 K/UL (ref 0–0.7)
EOSINOPHIL NFR BLD AUTO: 1 %
ERYTHROCYTE [DISTWIDTH] IN BLOOD BY AUTOMATED COUNT: 2.1 M/UL (ref 4.3–5.9)
ERYTHROCYTE [DISTWIDTH] IN BLOOD: 13.6 % (ref 11.5–15.5)
GLUCOSE BLD-MCNC: 131 MG/DL (ref 70–110)
GLUCOSE SERPL-MCNC: 111 MG/DL (ref 74–99)
HCT VFR BLD AUTO: 24.2 % (ref 39–53)
HGB BLD-MCNC: 8 GM/DL (ref 13–17.5)
HYALINE CASTS UR QL AUTO: 3 /LPF (ref 0–2)
INR PPP: 1 (ref ?–1.2)
LYMPHOCYTES # SPEC AUTO: 2.8 K/UL (ref 1–4.8)
LYMPHOCYTES NFR SPEC AUTO: 27 %
MCH RBC QN AUTO: 37.9 PG (ref 25–35)
MCHC RBC AUTO-ENTMCNC: 33 G/DL (ref 31–37)
MCV RBC AUTO: 115 FL (ref 80–100)
MONOCYTES # BLD AUTO: 0.6 K/UL (ref 0–1)
MONOCYTES NFR BLD AUTO: 6 %
NEUTROPHILS # BLD AUTO: 6.9 K/UL (ref 1.3–7.7)
NEUTROPHILS NFR BLD AUTO: 66 %
PH UR: 5.5 [PH] (ref 5–8)
PLATELET # BLD AUTO: 240 K/UL (ref 150–450)
POTASSIUM SERPL-SCNC: 4.7 MMOL/L (ref 3.5–5.1)
PROT SERPL-MCNC: 6.9 G/DL (ref 6.3–8.2)
PT BLD: 10.8 SEC (ref 10–12.5)
RBC UR QL: <1 /HPF (ref 0–5)
SODIUM SERPL-SCNC: 137 MMOL/L (ref 137–145)
SP GR UR: 1.02 (ref 1–1.03)
SQUAMOUS UR QL AUTO: 2 /HPF (ref 0–4)
UROBILINOGEN UR QL STRIP: <2 MG/DL (ref ?–2)
WBC # BLD AUTO: 10.6 K/UL (ref 3.8–10.6)
WBC # UR AUTO: 20 /HPF (ref 0–5)

## 2025-03-08 PROCEDURE — 87086 URINE CULTURE/COLONY COUNT: CPT

## 2025-03-08 PROCEDURE — 80053 COMPREHEN METABOLIC PANEL: CPT

## 2025-03-08 PROCEDURE — 83605 ASSAY OF LACTIC ACID: CPT

## 2025-03-08 PROCEDURE — 93005 ELECTROCARDIOGRAM TRACING: CPT

## 2025-03-08 PROCEDURE — 85025 COMPLETE CBC W/AUTO DIFF WBC: CPT

## 2025-03-08 PROCEDURE — 84484 ASSAY OF TROPONIN QUANT: CPT

## 2025-03-08 PROCEDURE — 99285 EMERGENCY DEPT VISIT HI MDM: CPT

## 2025-03-08 PROCEDURE — 36415 COLL VENOUS BLD VENIPUNCTURE: CPT

## 2025-03-08 PROCEDURE — 51701 INSERT BLADDER CATHETER: CPT

## 2025-03-08 PROCEDURE — 87636 SARSCOV2 & INF A&B AMP PRB: CPT

## 2025-03-08 PROCEDURE — 71046 X-RAY EXAM CHEST 2 VIEWS: CPT

## 2025-03-08 PROCEDURE — 96374 THER/PROPH/DIAG INJ IV PUSH: CPT

## 2025-03-08 PROCEDURE — 85730 THROMBOPLASTIN TIME PARTIAL: CPT

## 2025-03-08 PROCEDURE — 85610 PROTHROMBIN TIME: CPT

## 2025-03-08 PROCEDURE — 81001 URINALYSIS AUTO W/SCOPE: CPT

## 2025-03-08 RX ADMIN — CEFTRIAXONE SODIUM STA MG: 1 INJECTION, POWDER, FOR SOLUTION INTRAMUSCULAR; INTRAVENOUS at 21:08

## 2025-03-08 RX ADMIN — CEPHALEXIN STA MG: 500 CAPSULE ORAL at 21:08

## 2025-03-08 NOTE — XR
EXAMINATION TYPE: XR chest 2V

 

DATE OF EXAM: 3/8/2025 6:17 PM

 

COMPARISON: Chest radiograph 9/23/2024.

 

CLINICAL INDICATION: Male, 79 years old with history of Cough, fever; PHH

 

TECHNIQUE: XR chest 2V Frontal and lateral views of the chest.

 

FINDINGS: 

Lungs/Pleura: There is no evidence of pleural effusion, focal consolidation, or pneumothorax.  Mild p
ulmonary vascular congestion.

Pulmonary vascularity: Unremarkable.

Heart/mediastinum: Cardiomegaly. Previous aortic valve replacement.

Musculoskeletal: No acute osseous pathology.

 

Other findings: None

 

 

IMPRESSION: 

Cardiomegaly and mild pulmonary vascular congestion. No sizable pleural effusions.

 

[facility], Workstation: [id number], 3/8/2025 6:20 PM

## 2025-03-08 NOTE — ED
Weakness HPI





- General


Chief complaint: Weakness


Stated complaint: Fever


Time Seen by Provider: 25 17:15


Source: patient, family, RN notes reviewed


Mode of arrival: ambulatory


Limitations: no limitations





- History of Present Illness


Initial comments: 





This is a 79-year-old male with history of CAD, CML and coronary stents 

presenting with wife for changes in health starting this morning.  Wife states 

patient was brought to ER due to PCP advising patient be immediately evaluated 

if he should develop a fever due to history of CML.  Endorses fever, dizziness 

and increased fatigue/lethargy.  Patient states he does self cath.  Denies 

recent sick contact.  Patient denies chest pain, dyspnea, abdominal pain, N/V/D,

headache, weakness, paresthesia.


MD Complaint: generalized weakness


Onset/Timin


-: days(s)


Associated Symptoms: fever/chills





- Related Data


                                Home Medications











 Medication  Instructions  Recorded  Confirmed


 


Ascorbic Acid [Vitamin C] 1,000 mg PO DAILY 19


 


Aspirin 81 mg PO DAILY 19


 


Losartan [Cozaar] 75 mg PO DAILY 19


 


Metoprolol Tartrate [Lopressor] 25 mg PO BID 19


 


Nitroglycerin Sl Tabs [Nitrostat] 0.4 mg SL Q5M PRN 19


 


Ubidecarenone [Co Q-10] 100 mg PO DAILY 19


 


Docusate [Colace] 100 mg PO BID 21


 


Albuterol Inhaler [Ventolin Hfa 1 - 2 puff INHALATION RT-QID PRN 22





Inhaler]   


 


Fluticasone/Vilanterol [Breo 1 puff INHALATION RT-QID PRN 22





Ellipta 200-25 Mcg Inhaler]   


 


Cranberry Fruit [Cranberry] 465 mg PO DAILY 22


 


Furosemide [Lasix] 40 mg PO DAILY PRN 22


 


Quercetin 800 mg PO BID 22


 


Zinc Gluconate [Zinc] 50 mg PO DAILY 22


 


Cholecalciferol [Vitamin D3 (125 125 mcg PO DAILY 23





Mcg = 5000 Iu)]   


 


Dasatinib [Sprycel] 100 mg PO DAILY@0300 23


 


Cephalexin [Keflex] 500 mg PO TID 24


 


Cetirizine HCl [Zyrtec] 10 mg PO DAILY 24


 


L.acidoph,Paracasei, B.lactis 1 cap PO TID 24





[Probiotic]   


 


Multivitamin/Iron/Folic Acid 1 tab PO DAILY 24





[Centrum Adults Tablet]   


 


Turmeric Root Extract [Turmeric] 500 mg PO DAILY 24


 


valACYclovir HCL [Valtrex] 1,000 mg PO TID 24








                                  Previous Rx's











 Medication  Instructions  Recorded


 


Colchicine [Colcrys] 0.6 mg PO BID #14 each 24


 


Cephalexin [Keflex] 500 mg PO Q8HR 1 Days #30 cap 25











                                    Allergies











Allergy/AdvReac Type Severity Reaction Status Date / Time


 


adhesive tape Allergy  Rash/Hives,"paper Verified 25 17:24





   tape is ok"  


 


amoxicillin [From Augmentin] Allergy  Rash/Hives Verified 25 17:24


 


atorvastatin [From Lipitor] Allergy  Muscle Verified 25 17:24





   Aches  


 


budesonide [From Symbicort] Allergy  Vision Verified 25 17:24





   issues  


 


ciprofloxacin Allergy  Muscle Verified 25 17:24





   aches  


 


clavulanic acid Allergy  Rash/Hives Verified 25 17:24





[From Augmentin]     


 


mold Allergy  Rash/Hives Verified 25 17:24


 


doxycycline AdvReac  Rectal Verified 25 17:24





   Bleeding  


 


enalapril AdvReac  Cough Verified 25 17:24


 


ezetimibe [From Vytorin] AdvReac  Muscle Verified 25 17:24





   Aches  


 


fluticasone AdvReac  Irregular Verified 25 17:24





[From Advair Diskus]   heart rate  


 


formoterol [From Symbicort] AdvReac  Vision Verified 25 17:24





   issues  


 


nilotinib [From Tasigna] AdvReac  Pancreatiti Verified 25 17:24





   s  


 


salmeterol AdvReac  Irregular Verified 25 17:24





[From Advair Diskus]   heart rate  


 


simvastatin [From Vytorin] AdvReac  Muscle Verified 25 17:24





   Aches  


 


Statins-HMG-CoA Reductase AdvReac  Muscle Verified 25 17:24





Inhibitor   aches  





[Statins-Hmg-Coa Reductase     





Inhibitor]     


 


Cotton material Allergy  itching, Uncoded 25 17:24





   hives  














Review of Systems


ROS Statement: 


Those systems with pertinent positive or pertinent negative responses have been 

documented in the HPI.





ROS Other: All systems not noted in ROS Statement are negative.





Past Medical History


Past Medical History: Coronary Artery Disease (CAD), Cancer, GERD/Reflux, 

Hypertension, Skin Disorder


Additional Past Medical History / Comment(s): Hx. of CML, Hx. of Shingles L eye,

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


History of Any Multi-Drug Resistant Organisms: None Reported


Past Surgical History: Cholecystectomy, Heart Catheterization With Stent, Hernia

Repair, Orthopedic Surgery


Additional Past Surgical History / Comment(s): Thyroid surgery,Vasectomy, R 

ankle surgery, Sinus surgery, Basal cell R shoulder, Pyloroplasty & Vagotomy, 

Hemorrhoidectomy, Cataract surgery & bx. of inner thigh.aortic valve 23


Past Anesthesia/Blood Transfusion Reactions: No Reported Reaction


Date of Last Stent Placement:: 


Past Psychological History: No Psychological Hx Reported


Smoking Status: Former smoker


Past Alcohol Use History: None Reported


Past Drug Use History: None Reported





- Past Family History


  ** Mother


Family Medical History: Cancer, Pulmonary Embolus





  ** Father


Family Medical History: Cancer





  ** Brother(s)


Family Medical History: Cancer





  ** Son(s)


Family Medical History: Cancer





General Exam


Limitations: no limitations


General appearance: alert, in no apparent distress


Head exam: Present: atraumatic, normocephalic, normal inspection


Eye exam: Present: normal appearance, PERRL, EOMI.  Absent: scleral icterus, 

conjunctival injection, periorbital swelling


ENT exam: Present: normal exam, mucous membranes moist


Neck exam: Present: normal inspection.  Absent: tenderness, meningismus, 

lymphadenopathy


Respiratory exam: Present: normal lung sounds bilaterally.  Absent: respiratory 

distress, wheezes, rales, rhonchi, stridor


Cardiovascular Exam: Present: regular rate, normal rhythm, normal heart sounds. 

Absent: systolic murmur, diastolic murmur, rubs, gallop, clicks


GI/Abdominal exam: Present: soft, normal bowel sounds.  Absent: distended, 

tenderness, guarding, rebound, rigid


Extremities exam: Present: normal inspection, full ROM, normal capillary refill,

pedal edema (Positive BLE pitting edema extending to knee.  Bilateral posterior 

tibialis pulse +2).  Absent: tenderness, joint swelling, calf tenderness


Back exam: Present: normal inspection.  Absent: CVA tenderness (R), CVA 

tenderness (L)


Neurological exam: Present: alert, oriented X3, CN II-XII intact


Psychiatric exam: Present: normal affect, normal mood


Skin exam: Present: warm, dry, intact, normal color.  Absent: rash





Course


                                   Vital Signs











  25





  17:21


 


Temperature 98.5 F


 


Pulse Rate 71


 


Respiratory 16





Rate 


 


Blood Pressure 123/57


 


O2 Sat by Pulse 98





Oximetry 














Medical Decision Making





- Medical Decision Making





Was pt. sent in by a medical professional or institution (, PA, NP, urgent 

care, hospital, or nursing home...) When possible be specific


@ -[No]


Did you speak to anyone other than the patient for history (EMS, parent, family,

police, friend...)? What history was obtained from this source 


@ -Wife provided majority of HPI


Did you review nursing and triage notes (agree or disagree)? Why? 


@ -[I reviewed and agree with nursing and triage notes]


Were old charts reviewed (outside hosp., previous admission, EMS record, old 

EKG, old radiological studies, urgent care reports/EKG's, nursing home records)?

Report findings 


@ -[No old charts were reviewed]


Differential Diagnosis (chest pain, altered mental status, abdominal pain women,

abdominal pain men, vaginal bleeding, weakness, fever, dyspnea, syncope, 

headache, dizziness, GI bleed, back pain, seizure, CVA, palpatations, mental 

health, musculoskeletal)? 


@ -Differential Fever:


Pneumonia, viral URI, endocarditis, myocarditis, pericarditis, otitis, 

sinusitis, peritonsillar Abscess, retropharyngeal Abscess, epiglottitis, 

peritonitis, appendicitis, Soila cystitis, diverticulitis, hepatitis, colitis, 

UTI, PID, TOA, pyelonephritis, prostatitis, epididymitis, meningitis, 

encephalitis, pulmonary embolism, CVA, thyroid storm, pancreatitis, adrenal 

crisis, cavernous sinus thrombosis, this is not meant to be an all-inclusive 

list. 


Differential Dizziness:


Benign paroxysmal positional Vertigo, Meniere's disease, otitis media, acoustic 

neuroma, vertebrobasilar insufficiency, cerebellar stroke, encephalitis, 

hypovolemic, arrhythmia, coronary artery syndrome, anemia, this is not meant to 

be an all-inclusive list





EKG interpreted by me (3pts min.).


@ -Sinus rhythm without ST deviation or T wave inversion.  Ventricular rate 71 

bpm, RUTH 195 ms,  ms, QTc 420 ms.


X-rays interpreted by me (1pt min.).


@ -[None done]


CT interpreted by me (1pt min.).


@ -[None done]


U/S interpreted by me (1pt. min.).


@ -[None done]


What testing was considered but not performed or refused? (CT, X-rays, U/S, 

labs)? Why?


@ -[None]


What meds were considered but not given or refused? Why?


@ -[None]


Did you discuss the management of the patient with other professionals 

(professionals i.e. , PA, NP, lab, RT, psych nurse, , , 

teacher, , )? Give summary


@ -[No]


Was smoking cessation discussed for >3mins.?


@ -[No]


Was critical care preformed (if so, how long)?


@ -[No]


Were there social determinants of health that impacted care today? How? 

(Homelessness, low income, unemployed, alcoholism, drug addiction, 

transportation, low edu. Level, literacy, decrease access to med. care, longterm, 

rehab)?


@ -[No]


Was there de-escalation of care discussed even if they declined (Discuss DNR or 

withdrawal of care, Hospice)? DNR status


@ -[No]


What co-morbidities impacted this encounter? (DM, HTN, Smoking, COPD, CAD, 

Cancer, CVA, ARF, Chemo, Hep., AIDS, mental health diagnosis, sleep apnea, 

morbid obesity)?


@ -CML, CAD


Was patient admitted / discharged? Hospital course, mention meds given and 

route, prescriptions, significant lab abnormalities, going to OR and other 

pertinent info.


@ -[hospital course] 


Undiagnosed new problem with uncertain prognosis?


@ -[No]


Drug Therapy requiring intensive monitoring for toxicity (Heparin, Nitro, 

Insulin, Cardizem)?


@ -[No]


Were any procedures done?


@ -[No]


Diagnosis/symptom?


@ -[default]


Acute, or Chronic, or Acute on Chronic?


@ -Acute


Uncomplicated (without systemic symptoms) or Complicated (systemic symptoms)?


@ -Complicated


Side effects of treatment?


@ -[No]


Exacerbation, Progression, or Severe Exacerbation?


@ -[No]


Poses a threat to life or bodily function? How? (Chest pain, USA, MI, pneumonia,

 PE, COPD, DKA, ARF, appy, cholecystitis, CVA, Diverticulitis, Homicidal, 

Suicidal, threat to staff... and all critical care pts)


@ -[No]





- Lab Data


Result diagrams: 


                                 25 18:11





                                 25 18:11


                                   Lab Results











  25 Range/Units





  17:34 18:11 18:11 


 


WBC   10.6   (3.8-10.6)  k/uL


 


RBC   2.10 L   (4.30-5.90)  m/uL


 


Hgb   8.0 L   (13.0-17.5)  gm/dL


 


Hct   24.2 L   (39.0-53.0)  %


 


MCV   115.0 H   (80.0-100.0)  fL


 


MCH   37.9 H   (25.0-35.0)  pg


 


MCHC   33.0   (31.0-37.0)  g/dL


 


RDW   13.6   (11.5-15.5)  %


 


Plt Count   240   (150-450)  k/uL


 


MPV   7.1   


 


Neutrophils %   66   %


 


Lymphocytes %   27   %


 


Monocytes %   6   %


 


Eosinophils %   1   %


 


Basophils %   0   %


 


Neutrophils #   6.9   (1.3-7.7)  k/uL


 


Lymphocytes #   2.8   (1.0-4.8)  k/uL


 


Monocytes #   0.6   (0-1.0)  k/uL


 


Eosinophils #   0.1   (0-0.7)  k/uL


 


Basophils #   0.0   (0-0.2)  k/uL


 


Manual Slide Review   Performed   


 


Hypochromasia   Slight   


 


Macrocytosis   Marked A   


 


PT    10.8  (10.0-12.5)  sec


 


INR    1.0  (<1.2)  


 


APTT    22.8  (22.0-30.0)  sec


 


Sodium     (137-145)  mmol/L


 


Potassium     (3.5-5.1)  mmol/L


 


Chloride     ()  mmol/L


 


Carbon Dioxide     (22-30)  mmol/L


 


Anion Gap     mmol/L


 


BUN     (9-20)  mg/dL


 


Creatinine     (0.66-1.25)  mg/dL


 


Est GFR (CKD-EPI)AfAm     (>60 ml/min/1.73 sqM)  


 


Est GFR (CKD-EPI)NonAf     (>60 ml/min/1.73 sqM)  


 


Glucose     (74-99)  mg/dL


 


POC Glucose (mg/dL)  131 H    ()  mg/dL


 


POC Glu Operater ID  MEDIC ARLEEN    


 


Plasma Lactic Acid Dave     (0.7-2.0)  mmol/L


 


Calcium     (8.4-10.2)  mg/dL


 


Total Bilirubin     (0.2-1.3)  mg/dL


 


AST     (17-59)  U/L


 


ALT     (4-49)  U/L


 


Alkaline Phosphatase     ()  U/L


 


Troponin I     (0.000-0.034)  ng/mL


 


Total Protein     (6.3-8.2)  g/dL


 


Albumin     (3.5-5.0)  g/dL


 


Urine Color     


 


Urine Appearance     (Clear)  


 


Urine pH     (5.0-8.0)  


 


Ur Specific Gravity     (1.001-1.035)  


 


Urine Protein     (Negative)  


 


Urine Glucose (UA)     (Negative)  


 


Urine Ketones     (Negative)  


 


Urine Blood     (Negative)  


 


Urine Nitrite     (Negative)  


 


Urine Bilirubin     (Negative)  


 


Urine Urobilinogen     (<2.0)  mg/dL


 


Ur Leukocyte Esterase     (Negative)  


 


Urine RBC     (0-5)  /hpf


 


Urine WBC     (0-5)  /hpf


 


Urine WBC Clumps     (None)  /hpf


 


Ur Squamous Epith Cells     (0-4)  /hpf


 


Urine Bacteria     (None)  /hpf


 


Hyaline Casts     (0-2)  /lpf


 


Influenza Type A (PCR)     (Not Detectd)  


 


Influenza Type B (PCR)     (Not Detectd)  


 


RSV (PCR)     (Not Detectd)  


 


SARS-CoV-2 (PCR)     (Not Detectd)  














  25 Range/Units





  18:11 18:11 18:11 


 


WBC     (3.8-10.6)  k/uL


 


RBC     (4.30-5.90)  m/uL


 


Hgb     (13.0-17.5)  gm/dL


 


Hct     (39.0-53.0)  %


 


MCV     (80.0-100.0)  fL


 


MCH     (25.0-35.0)  pg


 


MCHC     (31.0-37.0)  g/dL


 


RDW     (11.5-15.5)  %


 


Plt Count     (150-450)  k/uL


 


MPV     


 


Neutrophils %     %


 


Lymphocytes %     %


 


Monocytes %     %


 


Eosinophils %     %


 


Basophils %     %


 


Neutrophils #     (1.3-7.7)  k/uL


 


Lymphocytes #     (1.0-4.8)  k/uL


 


Monocytes #     (0-1.0)  k/uL


 


Eosinophils #     (0-0.7)  k/uL


 


Basophils #     (0-0.2)  k/uL


 


Manual Slide Review     


 


Hypochromasia     


 


Macrocytosis     


 


PT     (10.0-12.5)  sec


 


INR     (<1.2)  


 


APTT     (22.0-30.0)  sec


 


Sodium  137    (137-145)  mmol/L


 


Potassium  4.7    (3.5-5.1)  mmol/L


 


Chloride  109 H    ()  mmol/L


 


Carbon Dioxide  18 L    (22-30)  mmol/L


 


Anion Gap  10    mmol/L


 


BUN  28 H    (9-20)  mg/dL


 


Creatinine  1.20    (0.66-1.25)  mg/dL


 


Est GFR (CKD-EPI)AfAm  66    (>60 ml/min/1.73 sqM)  


 


Est GFR (CKD-EPI)NonAf  57    (>60 ml/min/1.73 sqM)  


 


Glucose  111 H    (74-99)  mg/dL


 


POC Glucose (mg/dL)     ()  mg/dL


 


POC Glu Operater ID     


 


Plasma Lactic Acid Dave   0.8   (0.7-2.0)  mmol/L


 


Calcium  8.9    (8.4-10.2)  mg/dL


 


Total Bilirubin  0.9    (0.2-1.3)  mg/dL


 


AST  41    (17-59)  U/L


 


ALT  30    (4-49)  U/L


 


Alkaline Phosphatase  59    ()  U/L


 


Troponin I    0.012  (0.000-0.034)  ng/mL


 


Total Protein  6.9    (6.3-8.2)  g/dL


 


Albumin  4.2    (3.5-5.0)  g/dL


 


Urine Color     


 


Urine Appearance     (Clear)  


 


Urine pH     (5.0-8.0)  


 


Ur Specific Gravity     (1.001-1.035)  


 


Urine Protein     (Negative)  


 


Urine Glucose (UA)     (Negative)  


 


Urine Ketones     (Negative)  


 


Urine Blood     (Negative)  


 


Urine Nitrite     (Negative)  


 


Urine Bilirubin     (Negative)  


 


Urine Urobilinogen     (<2.0)  mg/dL


 


Ur Leukocyte Esterase     (Negative)  


 


Urine RBC     (0-5)  /hpf


 


Urine WBC     (0-5)  /hpf


 


Urine WBC Clumps     (None)  /hpf


 


Ur Squamous Epith Cells     (0-4)  /hpf


 


Urine Bacteria     (None)  /hpf


 


Hyaline Casts     (0-2)  /lpf


 


Influenza Type A (PCR)     (Not Detectd)  


 


Influenza Type B (PCR)     (Not Detectd)  


 


RSV (PCR)     (Not Detectd)  


 


SARS-CoV-2 (PCR)     (Not Detectd)  














  25 Range/Units





  18:11 19:42 


 


WBC    (3.8-10.6)  k/uL


 


RBC    (4.30-5.90)  m/uL


 


Hgb    (13.0-17.5)  gm/dL


 


Hct    (39.0-53.0)  %


 


MCV    (80.0-100.0)  fL


 


MCH    (25.0-35.0)  pg


 


MCHC    (31.0-37.0)  g/dL


 


RDW    (11.5-15.5)  %


 


Plt Count    (150-450)  k/uL


 


MPV    


 


Neutrophils %    %


 


Lymphocytes %    %


 


Monocytes %    %


 


Eosinophils %    %


 


Basophils %    %


 


Neutrophils #    (1.3-7.7)  k/uL


 


Lymphocytes #    (1.0-4.8)  k/uL


 


Monocytes #    (0-1.0)  k/uL


 


Eosinophils #    (0-0.7)  k/uL


 


Basophils #    (0-0.2)  k/uL


 


Manual Slide Review    


 


Hypochromasia    


 


Macrocytosis    


 


PT    (10.0-12.5)  sec


 


INR    (<1.2)  


 


APTT    (22.0-30.0)  sec


 


Sodium    (137-145)  mmol/L


 


Potassium    (3.5-5.1)  mmol/L


 


Chloride    ()  mmol/L


 


Carbon Dioxide    (22-30)  mmol/L


 


Anion Gap    mmol/L


 


BUN    (9-20)  mg/dL


 


Creatinine    (0.66-1.25)  mg/dL


 


Est GFR (CKD-EPI)AfAm    (>60 ml/min/1.73 sqM)  


 


Est GFR (CKD-EPI)NonAf    (>60 ml/min/1.73 sqM)  


 


Glucose    (74-99)  mg/dL


 


POC Glucose (mg/dL)    ()  mg/dL


 


POC Glu Operater ID    


 


Plasma Lactic Acid Dave    (0.7-2.0)  mmol/L


 


Calcium    (8.4-10.2)  mg/dL


 


Total Bilirubin    (0.2-1.3)  mg/dL


 


AST    (17-59)  U/L


 


ALT    (4-49)  U/L


 


Alkaline Phosphatase    ()  U/L


 


Troponin I    (0.000-0.034)  ng/mL


 


Total Protein    (6.3-8.2)  g/dL


 


Albumin    (3.5-5.0)  g/dL


 


Urine Color   Yellow  


 


Urine Appearance   Clear  (Clear)  


 


Urine pH   5.5  (5.0-8.0)  


 


Ur Specific Gravity   1.016  (1.001-1.035)  


 


Urine Protein   Negative  (Negative)  


 


Urine Glucose (UA)   Negative  (Negative)  


 


Urine Ketones   Negative  (Negative)  


 


Urine Blood   Negative  (Negative)  


 


Urine Nitrite   Negative  (Negative)  


 


Urine Bilirubin   Negative  (Negative)  


 


Urine Urobilinogen   <2.0  (<2.0)  mg/dL


 


Ur Leukocyte Esterase   Moderate H  (Negative)  


 


Urine RBC   <1  (0-5)  /hpf


 


Urine WBC   20 H  (0-5)  /hpf


 


Urine WBC Clumps   Occasional H  (None)  /hpf


 


Ur Squamous Epith Cells   2  (0-4)  /hpf


 


Urine Bacteria   Many H  (None)  /hpf


 


Hyaline Casts   3 H  (0-2)  /lpf


 


Influenza Type A (PCR)  Not Detected   (Not Detectd)  


 


Influenza Type B (PCR)  Not Detected   (Not Detectd)  


 


RSV (PCR)  Not Detected   (Not Detectd)  


 


SARS-CoV-2 (PCR)  Not Detected   (Not Detectd)  














Disposition


Clinical Impression: 


 UTI (urinary tract infection)





Disposition: HOME SELF-CARE


Condition: Good


Instructions (If sedation given, give patient instructions):  Urinary Tract I

nfection in Men (ED)


Additional Instructions: 


Increase water and cranberry juice intake.  Follow-up with PCP/urology.


Prescriptions: 


Cephalexin [Keflex] 500 mg PO Q8HR 1 Days #30 cap


Is patient prescribed a controlled substance at d/c from ED?: No


Referrals: 


Mumtaz Roland MD [Primary Care Provider] - 1-2 days


Time of Disposition: 20:40

## 2025-04-08 ENCOUNTER — HOSPITAL ENCOUNTER (OUTPATIENT)
Dept: HOSPITAL 47 - RADCTMAIN | Age: 80
Discharge: HOME | End: 2025-04-08
Attending: EMERGENCY MEDICINE
Payer: MEDICARE

## 2025-04-08 DIAGNOSIS — I71.21: ICD-10-CM

## 2025-04-08 DIAGNOSIS — R91.1: ICD-10-CM

## 2025-04-08 DIAGNOSIS — R90.82: ICD-10-CM

## 2025-04-08 DIAGNOSIS — S09.90XA: Primary | ICD-10-CM

## 2025-04-08 DIAGNOSIS — I25.10: ICD-10-CM

## 2025-04-08 DIAGNOSIS — I31.39: ICD-10-CM

## 2025-04-08 DIAGNOSIS — I67.82: ICD-10-CM

## 2025-04-08 DIAGNOSIS — S29.9XXA: ICD-10-CM

## 2025-04-08 PROCEDURE — 71250 CT THORAX DX C-: CPT

## 2025-04-08 PROCEDURE — 70450 CT HEAD/BRAIN W/O DYE: CPT

## 2025-06-10 ENCOUNTER — HOSPITAL ENCOUNTER (INPATIENT)
Dept: HOSPITAL 47 - EC | Age: 80
LOS: 5 days | Discharge: HOME | DRG: 689 | End: 2025-06-15
Attending: INTERNAL MEDICINE | Admitting: INTERNAL MEDICINE
Payer: MEDICARE

## 2025-06-10 DIAGNOSIS — I25.10: ICD-10-CM

## 2025-06-10 DIAGNOSIS — I27.22: ICD-10-CM

## 2025-06-10 DIAGNOSIS — Z87.891: ICD-10-CM

## 2025-06-10 DIAGNOSIS — Z95.3: ICD-10-CM

## 2025-06-10 DIAGNOSIS — B96.89: ICD-10-CM

## 2025-06-10 DIAGNOSIS — I31.39: ICD-10-CM

## 2025-06-10 DIAGNOSIS — I48.91: ICD-10-CM

## 2025-06-10 DIAGNOSIS — Z95.5: ICD-10-CM

## 2025-06-10 DIAGNOSIS — I13.10: ICD-10-CM

## 2025-06-10 DIAGNOSIS — J90: ICD-10-CM

## 2025-06-10 DIAGNOSIS — I08.1: ICD-10-CM

## 2025-06-10 DIAGNOSIS — Z87.440: ICD-10-CM

## 2025-06-10 DIAGNOSIS — N17.9: ICD-10-CM

## 2025-06-10 DIAGNOSIS — J18.9: ICD-10-CM

## 2025-06-10 DIAGNOSIS — E87.8: ICD-10-CM

## 2025-06-10 DIAGNOSIS — C92.10: ICD-10-CM

## 2025-06-10 DIAGNOSIS — J44.0: ICD-10-CM

## 2025-06-10 DIAGNOSIS — N18.32: ICD-10-CM

## 2025-06-10 DIAGNOSIS — Z79.899: ICD-10-CM

## 2025-06-10 DIAGNOSIS — N18.31: ICD-10-CM

## 2025-06-10 DIAGNOSIS — D53.9: ICD-10-CM

## 2025-06-10 DIAGNOSIS — N39.0: Primary | ICD-10-CM

## 2025-06-10 DIAGNOSIS — E87.20: ICD-10-CM

## 2025-06-10 DIAGNOSIS — Z79.82: ICD-10-CM

## 2025-06-10 DIAGNOSIS — Z86.19: ICD-10-CM

## 2025-06-10 LAB
ACANTHOCYTES BLD QL SMEAR: (no result)
AGRAN PLATELETS BLD QL SMEAR: (no result)
ALBUMIN SERPL-MCNC: 4 G/DL (ref 3.5–5)
ALP SERPL-CCNC: 70 U/L (ref 38–126)
ALT SERPL-CCNC: 20 U/L (ref 4–49)
AMORPH SED URNS QL MICRO: (no result) /HPF
ANION GAP SERPL CALC-SCNC: 9 MMOL/L
ANISOCYTOSIS BLD QL SMEAR: (no result)
ANISOCYTOSIS BLD QL: (no result)
APPEARANCE UR: CLEAR
APTT BLD: 22.5 SEC (ref 22–30)
AST SERPL-CCNC: 30 U/L (ref 17–59)
AUER BODIES BLD QL SMEAR: (no result)
BACTERIA UR QL AUTO: (no result) /HPF
BASO STIPL BLD QL SMEAR: (no result)
BASO STIPL BLD QL SMEAR: (no result)
BASOPHILS # BLD AUTO: 0.04 10*3/UL (ref 0–0.1)
BASOPHILS # BLD MANUAL: (no result) K/UL (ref 0–0.2)
BASOPHILS NFR BLD AUTO: 0.5 %
BASOPHILS NFR SPEC MANUAL: (no result) %
BILIRUB BLD-MCNC: 1.2 MG/DL (ref 0.2–1.3)
BILIRUB UR QL CFM: (no result)
BILIRUB UR QL STRIP.AUTO: NEGATIVE
BLASTS # BLD MANUAL: (no result) %
BLASTS # BLD MANUAL: (no result) K/UL
BROAD CASTS [PRESENCE] IN URINE BY COMPUTER ASSISTED METHOD: (no result) /LPF
BUN SERPL-SCNC: 35 MG/DL (ref 9–20)
BUN/CREAT SERPL: (no result)
BURR CELLS BLD QL SMEAR: (no result)
CA CARBONATE CRY #/AREA URNS HPF: (no result) /HPF
CA PHOS CRY UR QL COMP ASSIST: (no result) /HPF
CALCIUM SPEC-MCNC: 9.3 MG/DL (ref 8.4–10.2)
CAOX CRY UR QL COMP ASSIST: (no result) /HPF
CASTS URNS QL MICRO: (no result) /LPF
CELLS COUNTED: (no result)
CHLORIDE SERPL-SCNC: 108 MMOL/L (ref 98–107)
CO2 SERPL-SCNC: 22 MMOL/L (ref 22–30)
COLOR UR: YELLOW
CREATININE: 1.62 MG/DL (ref 0.66–1.25)
CRYSTALS UR QL: (no result) /HPF
CYSTINE CRY #/AREA URNS HPF: (no result) /HPF
DACRYOCYTES BLD QL SMEAR: (no result)
DACRYOCYTES BLD QL SMEAR: (no result)
DOHLE BOD BLD QL SMEAR: (no result)
DOHLE BOD BLD QL SMEAR: (no result)
ELLIPTOCYTES BLD QL SMEAR: (no result)
EOSINOPHIL # BLD AUTO: 0.02 10*3/UL (ref 0.04–0.35)
EOSINOPHIL # BLD MANUAL: (no result) K/UL (ref 0–0.7)
EOSINOPHIL NFR BLD AUTO: 0.3 %
EOSINOPHIL NFR BLD MANUAL: (no result) %
EPITHELIAL CASTS [PRESENCE] IN URINE BY COMPUTER ASSISTED METHOD: (no result) /LPF
ERYTHROCYTE CLUMPS [PRESENCE] IN URINE BY COMPUTER ASSISTED METHOD: (no result) /HPF
ERYTHROCYTE [DISTWIDTH] IN BLOOD BY AUTOMATED COUNT: 2.07 10*6/UL (ref 4.4–5.6)
ERYTHROCYTE [DISTWIDTH] IN BLOOD: 14.8 % (ref 11.5–14.5)
FATTY CASTS #/AREA UR COMP ASSIST: (no result) /LPF
FLUAV RNA SPEC QL NAA+PROBE: NOT DETECTED
FLUBV RNA SPEC QL NAA+PROBE: NOT DETECTED
GIANT PLATELETS BLD QL SMEAR: (no result)
GLUCOSE SERPL-MCNC: 111 MG/DL (ref 74–99)
GLUCOSE UR QL: NEGATIVE
GRAN CASTS UR QL COMP ASSIST: (no result) /LPF
HCT VFR BLD AUTO: 23.4 % (ref 39.6–50)
HELMET CELLS BLD QL SMEAR: (no result)
HGB BLD-MCNC: 7.8 G/DL (ref 13–17)
HOWELL-JOLLY BOD BLD QL SMEAR: (no result)
HOWELL-JOLLY BOD BLD QL SMEAR: (no result)
HYALINE CASTS UR QL AUTO: (no result) /LPF (ref 0–2)
HYPERCHROMASIA: (no result)
HYPOCHROMIA BLD QL SMEAR: (no result)
HYPOCHROMIA BLD QL SMEAR: (no result)
HYPOCHROMIA BLD QL: (no result)
IMM GRANULOCYTES # BLD: 0.02 10*3/UL (ref 0–0.04)
IMM GRANULOCYTES BLD QL AUTO: (no result)
INR PPP: 1 (ref ?–1.2)
KETONES UR QL STRIP.AUTO: (no result)
LEUCINE CRYSTALS [PRESENCE] IN URINE BY COMPUTER ASSISTED METHOD: (no result) /HPF
LEUKOCYTE ESTERASE UR QL STRIP.AUTO: (no result)
LG PLATELETS BLD QL SMEAR: (no result)
LG PLATELETS BLD QL SMEAR: (no result)
LYMPHOCYTES # BLD MANUAL: (no result) K/UL (ref 1–4.8)
LYMPHOCYTES # SPEC AUTO: 2.1 10*3/UL (ref 0.9–5)
LYMPHOCYTES NFR BLD MANUAL: (no result) %
LYMPHOCYTES NFR SPEC AUTO: 28.6 %
Lab: (no result)
MACROCYTES BLD QL SMEAR: (no result)
MACROCYTES BLD QL: (no result)
MAGNESIUM SPEC-SCNC: 2.3 MG/DL (ref 1.6–2.3)
MCH RBC QN AUTO: 37.7 PG (ref 27–32)
MCHC RBC AUTO-ENTMCNC: 33.3 G/DL (ref 32–37)
MCV RBC AUTO: 113 FL (ref 80–97)
METAMYELOCYTES # BLD: (no result) K/UL
METAMYELOCYTES NFR BLD MANUAL: (no result) %
MICROCYTES BLD QL SMEAR: (no result)
MICROCYTOSIS: (no result)
MIXED CELL CASTS UR QL COMP ASSIST: (no result) /LPF
MONOCYTES # BLD AUTO: 0.59 10*3/UL (ref 0.2–1)
MONOCYTES # BLD MANUAL: (no result) K/UL (ref 0–1)
MONOCYTES NFR BLD AUTO: 8 %
MONOCYTES NFR BLD MANUAL: (no result) %
MUCOUS THREADS UR QL AUTO: (no result) /HPF
MYELOCYTES # BLD MANUAL: (no result) K/UL
MYELOCYTES NFR BLD MANUAL: (no result) %
NEUTROPHILS # BLD AUTO: 4.56 10*3/UL (ref 1.8–7.7)
NEUTROPHILS NFR BLD AUTO: 62.3 %
NEUTROPHILS NFR BLD MANUAL: (no result) %
NEUTS BAND # BLD MANUAL: (no result) K/UL
NEUTS BAND NFR BLD: (no result) %
NEUTS HYPERSEG # BLD: (no result) 10*3/UL
NEUTS HYPERSEG # BLD: (no result) 10*3/UL
NEUTS SEG # BLD MANUAL: (no result) K/UL (ref 1.3–7.7)
NITRITE UR QL STRIP.AUTO: POSITIVE
NRBC # BLD: (no result) /100 WBC (ref 0–0)
NRBC BLD AUTO-RTO: (no result) %
NRBC BLD AUTO-RTO: (no result) %
OTHER CELLS NFR BLD MANUAL: (no result) %
OVAL FAT BODIES #/AREA UR COMP ASSIST: (no result) /HPF
OVALOCYTES BLD QL SMEAR: (no result)
OVALOCYTES BLD QL SMEAR: (no result)
PAPPENHEIMER BOD BLD QL SMEAR: (no result)
PARASITE [PRESENCE] IN BLOOD BY LIGHT MICROSCOPY: (no result)
PELGER HUET CELLS BLD QL SMEAR: (no result)
PELGER HUET CELLS BLD QL SMEAR: (no result)
PH UR: 5.5 [PH] (ref 5–8)
PHOSPHATE SERPL-MCNC: 3.9 MG/DL (ref 2.5–4.5)
PLASMA CELLS # BLD MANUAL: (no result) %
PLASMA CELLS # BLD MANUAL: (no result) K/UL
PLATELET # BLD AUTO: 218 10*3/UL (ref 140–440)
PLATELETS.RETICULATED NFR BLD AUTO: (no result) %
PLATELETS.RETICULATED NFR BLD AUTO: (no result) %
PMV BLD AUTO: 9 FL (ref 9.5–12.2)
POIKILOCYTOSIS BLD QL SMEAR: (no result)
POIKILOCYTOSIS: (no result)
POLYCHROMASIA BLD QL SMEAR: (no result)
POLYCHROMASIA BLD QL SMEAR: PRESENT
POTASSIUM SERPL-SCNC: 4.7 MMOL/L (ref 3.5–5.1)
PROMYELOCYTES # BLD: (no result) K/UL
PROMYELOCYTES NFR BLD MANUAL: (no result) %
PROT SERPL-MCNC: 6.9 G/DL (ref 6.3–8.2)
PROT UR QL SSA: (no result)
PROT UR QL: (no result)
PT BLD: 11.4 SEC (ref 10–12.5)
RBC AGGLUTINATION: (no result)
RBC CASTS UR QL COMP ASSIST: (no result) /LPF
RBC MORPH BLD: (no result)
RBC MORPH BLD: (no result)
RBC UR QL: <1 /HPF (ref 0–5)
RBC UR QL: NEGATIVE
RENAL EPI CELLS UR QL COMP ASSIST: (no result) /HPF
ROULEAUX BLD QL SMEAR: (no result)
ROULEAUX BLD QL SMEAR: (no result)
RSV RNA SPEC QL NAA+PROBE: NOT DETECTED
SARS-COV-2 RNA RESP QL NAA+PROBE: NOT DETECTED
SCHISTOCYTES BLD QL SMEAR: (no result)
SICKLE CELLS BLD QL SMEAR: (no result)
SICKLE CELLS BLD QL SMEAR: (no result)
SMUDGE CELLS BLD QL SMEAR: (no result)
SODIUM SERPL-SCNC: 139 MMOL/L (ref 137–145)
SP GR UR: 1.02 (ref 1–1.03)
SPECIMEN SOURCE: (no result)
SPERM # UR AUTO: (no result) /HPF
SPHEROCYTES BLD QL SMEAR: (no result)
SPHEROCYTES BLD QL SMEAR: (no result)
SQUAMOUS UR QL AUTO: <1 /HPF (ref 0–4)
STOMATOCYTES BLD QL SMEAR: (no result)
STOMATOCYTES BLD QL SMEAR: (no result)
TARGETS BLD QL SMEAR: (no result)
TARGETS BLD QL SMEAR: (no result)
TOXIC GRANULES BLD QL SMEAR: (no result)
TOXIC GRANULES BLD QL SMEAR: (no result)
TRANS CELLS UR QL COMP ASSIST: (no result) /HPF (ref 0–1)
TRI-PHOS CRY UR QL COMP ASSIST: (no result) /HPF
TRICHOMONAS UR QL COMP ASSIST: (no result) /HPF
TYROSINE CRYSTALS [PRESENCE] IN URINE BY COMPUTER ASSISTED METHOD: (no result) /HPF
URATE CRY UR QL COMP ASSIST: (no result) /HPF
UROBILINOGEN UR QL STRIP: <2 MG/DL (ref ?–2)
VARIANT LYMPHS BLD QL SMEAR: (no result)
WAXY CASTS #/AREA UR COMP ASSIST: (no result) /LPF
WBC # BLD AUTO: 7.33 10*3/UL (ref 4.5–10)
WBC #/AREA URNS HPF: 44 /HPF (ref 0–5)
WBC CASTS #/AREA URNS LPF: (no result) /LPF
WBC CLUMPS UR QL AUTO: (no result) /HPF
WBC NRBC COR # BLD: (no result) K/UL
WBC NRBC COR # BLD: (no result) K/UL
WBC TOXIC VACUOLES BLD QL SMEAR: (no result)
WBC TOXIC VACUOLES BLD QL SMEAR: (no result)
YEAST BUDDING UR QL COMP ASSIST: (no result) /HPF
YEAST HYPHAE UR QL COMP ASSIST: (no result) /HPF

## 2025-06-10 PROCEDURE — 84484 ASSAY OF TROPONIN QUANT: CPT

## 2025-06-10 PROCEDURE — 83090 ASSAY OF HOMOCYSTEINE: CPT

## 2025-06-10 PROCEDURE — 96365 THER/PROPH/DIAG IV INF INIT: CPT

## 2025-06-10 PROCEDURE — 85045 AUTOMATED RETICULOCYTE COUNT: CPT

## 2025-06-10 PROCEDURE — 83921 ORGANIC ACID SINGLE QUANT: CPT

## 2025-06-10 PROCEDURE — 87186 SC STD MICRODIL/AGAR DIL: CPT

## 2025-06-10 PROCEDURE — 93005 ELECTROCARDIOGRAM TRACING: CPT

## 2025-06-10 PROCEDURE — 36415 COLL VENOUS BLD VENIPUNCTURE: CPT

## 2025-06-10 PROCEDURE — 96366 THER/PROPH/DIAG IV INF ADDON: CPT

## 2025-06-10 PROCEDURE — 93306 TTE W/DOPPLER COMPLETE: CPT

## 2025-06-10 PROCEDURE — 84145 PROCALCITONIN (PCT): CPT

## 2025-06-10 PROCEDURE — 81001 URINALYSIS AUTO W/SCOPE: CPT

## 2025-06-10 PROCEDURE — 96367 TX/PROPH/DG ADDL SEQ IV INF: CPT

## 2025-06-10 PROCEDURE — 86901 BLOOD TYPING SEROLOGIC RH(D): CPT

## 2025-06-10 PROCEDURE — 86920 COMPATIBILITY TEST SPIN: CPT

## 2025-06-10 PROCEDURE — 85610 PROTHROMBIN TIME: CPT

## 2025-06-10 PROCEDURE — 87636 SARSCOV2 & INF A&B AMP PRB: CPT

## 2025-06-10 PROCEDURE — 84100 ASSAY OF PHOSPHORUS: CPT

## 2025-06-10 PROCEDURE — 82607 VITAMIN B-12: CPT

## 2025-06-10 PROCEDURE — 86850 RBC ANTIBODY SCREEN: CPT

## 2025-06-10 PROCEDURE — 76770 US EXAM ABDO BACK WALL COMP: CPT

## 2025-06-10 PROCEDURE — 99285 EMERGENCY DEPT VISIT HI MDM: CPT

## 2025-06-10 PROCEDURE — 87040 BLOOD CULTURE FOR BACTERIA: CPT

## 2025-06-10 PROCEDURE — 94760 N-INVAS EAR/PLS OXIMETRY 1: CPT

## 2025-06-10 PROCEDURE — 71046 X-RAY EXAM CHEST 2 VIEWS: CPT

## 2025-06-10 PROCEDURE — 80053 COMPREHEN METABOLIC PANEL: CPT

## 2025-06-10 PROCEDURE — 82525 ASSAY OF COPPER: CPT

## 2025-06-10 PROCEDURE — 87086 URINE CULTURE/COLONY COUNT: CPT

## 2025-06-10 PROCEDURE — 85027 COMPLETE CBC AUTOMATED: CPT

## 2025-06-10 PROCEDURE — 86900 BLOOD TYPING SEROLOGIC ABO: CPT

## 2025-06-10 PROCEDURE — 87077 CULTURE AEROBIC IDENTIFY: CPT

## 2025-06-10 PROCEDURE — 87449 NOS EACH ORGANISM AG IA: CPT

## 2025-06-10 PROCEDURE — 85025 COMPLETE CBC W/AUTO DIFF WBC: CPT

## 2025-06-10 PROCEDURE — 83735 ASSAY OF MAGNESIUM: CPT

## 2025-06-10 PROCEDURE — 85730 THROMBOPLASTIN TIME PARTIAL: CPT

## 2025-06-10 PROCEDURE — 80048 BASIC METABOLIC PNL TOTAL CA: CPT

## 2025-06-10 PROCEDURE — 71045 X-RAY EXAM CHEST 1 VIEW: CPT

## 2025-06-10 PROCEDURE — 82668 ASSAY OF ERYTHROPOIETIN: CPT

## 2025-06-10 PROCEDURE — 82272 OCCULT BLD FECES 1-3 TESTS: CPT

## 2025-06-10 PROCEDURE — 82746 ASSAY OF FOLIC ACID SERUM: CPT

## 2025-06-10 RX ADMIN — AZITHROMYCIN MONOHYDRATE STA MLS/HR: 500 INJECTION, POWDER, LYOPHILIZED, FOR SOLUTION INTRAVENOUS at 20:36

## 2025-06-10 RX ADMIN — POTASSIUM CHLORIDE SCH MLS/HR: 14.9 INJECTION, SOLUTION INTRAVENOUS at 17:37

## 2025-06-10 RX ADMIN — CEFAZOLIN SCH: 330 INJECTION, POWDER, FOR SOLUTION INTRAMUSCULAR; INTRAVENOUS at 21:24

## 2025-06-10 RX ADMIN — CEFTRIAXONE STA MLS/HR: 2 INJECTION, POWDER, FOR SOLUTION INTRAMUSCULAR; INTRAVENOUS at 20:01

## 2025-06-10 RX ADMIN — POTASSIUM CHLORIDE ONE MLS/HR: 14.9 INJECTION, SOLUTION INTRAVENOUS at 21:33

## 2025-06-11 LAB
ACANTHOCYTES BLD QL SMEAR: (no result)
ACANTHOCYTES BLD QL SMEAR: (no result)
AGRAN PLATELETS BLD QL SMEAR: (no result)
AGRAN PLATELETS BLD QL SMEAR: (no result)
ALBUMIN SERPL-MCNC: 3.5 G/DL (ref 3.5–5)
ALBUMIN/GLOB SERPL: 1.4 {RATIO}
ALP SERPL-CCNC: 62 U/L (ref 38–126)
ALT SERPL-CCNC: 16 U/L (ref 4–49)
ANION GAP SERPL CALC-SCNC: 11 MMOL/L
ANISOCYTOSIS BLD QL SMEAR: (no result)
ANISOCYTOSIS BLD QL SMEAR: (no result)
ANISOCYTOSIS BLD QL SMEAR: PRESENT
ANISOCYTOSIS BLD QL: (no result)
ANISOCYTOSIS BLD QL: (no result)
AST SERPL-CCNC: 27 U/L (ref 17–59)
AUER BODIES BLD QL SMEAR: (no result)
AUER BODIES BLD QL SMEAR: (no result)
BASO STIPL BLD QL SMEAR: (no result)
BASO STIPL BLD QL SMEAR: (no result)
BASOPHILS # BLD AUTO: 0.03 10*3/UL (ref 0–0.1)
BASOPHILS # BLD AUTO: 0.04 10*3/UL (ref 0–0.1)
BASOPHILS # BLD MANUAL: (no result) K/UL (ref 0–0.2)
BASOPHILS NFR BLD AUTO: 0.5 %
BASOPHILS NFR BLD AUTO: 0.5 %
BASOPHILS NFR SPEC MANUAL: (no result) %
BILIRUB BLD-MCNC: 0.7 MG/DL (ref 0.2–1.3)
BLASTS # BLD MANUAL: (no result) %
BLASTS # BLD MANUAL: (no result) K/UL
BUN SERPL-SCNC: 34 MG/DL (ref 9–20)
BURR CELLS BLD QL SMEAR: (no result)
CALCIUM SPEC-MCNC: 8.8 MG/DL (ref 8.4–10.2)
CELLS COUNTED: (no result)
CHLORIDE SERPL-SCNC: 108 MMOL/L (ref 98–107)
CO2 SERPL-SCNC: 18 MMOL/L (ref 22–30)
CREATININE: 1.32 MG/DL (ref 0.66–1.25)
DACRYOCYTES BLD QL SMEAR: (no result)
DACRYOCYTES BLD QL SMEAR: (no result)
DOHLE BOD BLD QL SMEAR: (no result)
DOHLE BOD BLD QL SMEAR: (no result)
ELLIPTOCYTES BLD QL SMEAR: (no result)
ELLIPTOCYTES BLD QL SMEAR: (no result)
EOSINOPHIL # BLD AUTO: 0.02 10*3/UL (ref 0.04–0.35)
EOSINOPHIL # BLD AUTO: 0.04 10*3/UL (ref 0.04–0.35)
EOSINOPHIL # BLD MANUAL: (no result) K/UL (ref 0–0.7)
EOSINOPHIL NFR BLD AUTO: 0.2 %
EOSINOPHIL NFR BLD AUTO: 0.6 %
EOSINOPHIL NFR BLD MANUAL: (no result) %
ERYTHROCYTE [DISTWIDTH] IN BLOOD BY AUTOMATED COUNT: 1.86 10*6/UL (ref 4.4–5.6)
ERYTHROCYTE [DISTWIDTH] IN BLOOD BY AUTOMATED COUNT: 1.88 10*6/UL (ref 4.4–5.6)
ERYTHROCYTE [DISTWIDTH] IN BLOOD: 14.8 % (ref 11.5–14.5)
ERYTHROCYTE [DISTWIDTH] IN BLOOD: 15.2 % (ref 11.5–14.5)
GIANT PLATELETS BLD QL SMEAR: (no result)
GIANT PLATELETS BLD QL SMEAR: (no result)
GLOBULIN SER CALC-MCNC: 2.5 G/DL
GLUCOSE SERPL-MCNC: 98 MG/DL (ref 74–99)
HCT VFR BLD AUTO: 21.4 % (ref 39.6–50)
HCT VFR BLD AUTO: 21.4 % (ref 39.6–50)
HELMET CELLS BLD QL SMEAR: (no result)
HELMET CELLS BLD QL SMEAR: (no result)
HGB BLD-MCNC: 7.2 G/DL (ref 13–17)
HGB BLD-MCNC: 7.2 G/DL (ref 13–17)
HOWELL-JOLLY BOD BLD QL SMEAR: (no result)
HOWELL-JOLLY BOD BLD QL SMEAR: (no result)
HYPERCHROMASIA: (no result)
HYPERCHROMASIA: (no result)
HYPOCHROMIA BLD QL SMEAR: (no result)
HYPOCHROMIA BLD QL SMEAR: (no result)
HYPOCHROMIA BLD QL: (no result)
HYPOCHROMIA BLD QL: (no result)
IMM GRANULOCYTES # BLD: 0.02 10*3/UL (ref 0–0.04)
IMM GRANULOCYTES # BLD: 0.02 10*3/UL (ref 0–0.04)
IMM GRANULOCYTES BLD QL AUTO: (no result)
LEGIONELLA ANTIGEN: NEGATIVE
LG PLATELETS BLD QL SMEAR: (no result)
LG PLATELETS BLD QL SMEAR: (no result)
LYMPHOCYTES # BLD MANUAL: (no result) K/UL (ref 1–4.8)
LYMPHOCYTES # SPEC AUTO: 2.58 10*3/UL (ref 0.9–5)
LYMPHOCYTES # SPEC AUTO: 3.54 10*3/UL (ref 0.9–5)
LYMPHOCYTES NFR BLD MANUAL: (no result) %
LYMPHOCYTES NFR SPEC AUTO: 39.6 %
LYMPHOCYTES NFR SPEC AUTO: 43.7 %
Lab: (no result)
MACROCYTES BLD QL SMEAR: (no result)
MACROCYTES BLD QL SMEAR: (no result)
MACROCYTES BLD QL: (no result)
MACROCYTES BLD QL: (no result)
MAGNESIUM SPEC-SCNC: 2.3 MG/DL (ref 1.6–2.3)
MCH RBC QN AUTO: 38.3 PG (ref 27–32)
MCH RBC QN AUTO: 38.7 PG (ref 27–32)
MCHC RBC AUTO-ENTMCNC: 33.6 G/DL (ref 32–37)
MCHC RBC AUTO-ENTMCNC: 33.6 G/DL (ref 32–37)
MCV RBC AUTO: 113.8 FL (ref 80–97)
MCV RBC AUTO: 115.1 FL (ref 80–97)
METAMYELOCYTES # BLD: (no result) K/UL
METAMYELOCYTES NFR BLD MANUAL: (no result) %
MICROCYTES BLD QL SMEAR: (no result)
MICROCYTES BLD QL SMEAR: (no result)
MICROCYTOSIS: (no result)
MICROCYTOSIS: (no result)
MONOCYTES # BLD AUTO: 0.49 10*3/UL (ref 0.2–1)
MONOCYTES # BLD AUTO: 0.65 10*3/UL (ref 0.2–1)
MONOCYTES # BLD MANUAL: (no result) K/UL (ref 0–1)
MONOCYTES NFR BLD AUTO: 7.5 %
MONOCYTES NFR BLD AUTO: 8 %
MONOCYTES NFR BLD MANUAL: (no result) %
MYELOCYTES # BLD MANUAL: (no result) K/UL
MYELOCYTES NFR BLD MANUAL: (no result) %
NEUTROPHILS # BLD AUTO: 3.35 10*3/UL (ref 1.8–7.7)
NEUTROPHILS # BLD AUTO: 3.83 10*3/UL (ref 1.8–7.7)
NEUTROPHILS NFR BLD AUTO: 47.4 %
NEUTROPHILS NFR BLD AUTO: 51.5 %
NEUTROPHILS NFR BLD MANUAL: (no result) %
NEUTS BAND # BLD MANUAL: (no result) K/UL
NEUTS BAND NFR BLD: (no result) %
NEUTS HYPERSEG # BLD: (no result) 10*3/UL
NEUTS HYPERSEG # BLD: (no result) 10*3/UL
NEUTS SEG # BLD MANUAL: (no result) K/UL (ref 1.3–7.7)
NRBC # BLD: (no result) /100 WBC (ref 0–0)
NRBC BLD AUTO-RTO: (no result) %
OTHER CELLS NFR BLD MANUAL: (no result) %
OVALOCYTES BLD QL SMEAR: (no result)
OVALOCYTES BLD QL SMEAR: (no result)
PAPPENHEIMER BOD BLD QL SMEAR: (no result)
PAPPENHEIMER BOD BLD QL SMEAR: (no result)
PARASITE [PRESENCE] IN BLOOD BY LIGHT MICROSCOPY: (no result)
PARASITE [PRESENCE] IN BLOOD BY LIGHT MICROSCOPY: (no result)
PELGER HUET CELLS BLD QL SMEAR: (no result)
PELGER HUET CELLS BLD QL SMEAR: (no result)
PHOSPHATE SERPL-MCNC: 3.4 MG/DL (ref 2.5–4.5)
PLASMA CELLS # BLD MANUAL: (no result) %
PLASMA CELLS # BLD MANUAL: (no result) K/UL
PLATELET # BLD AUTO: 178 10*3/UL (ref 140–440)
PLATELET # BLD AUTO: 210 10*3/UL (ref 140–440)
PLATELETS.RETICULATED NFR BLD AUTO: (no result) %
PLATELETS.RETICULATED NFR BLD AUTO: (no result) %
PMV BLD AUTO: 8.9 FL (ref 9.5–12.2)
PMV BLD AUTO: 8.9 FL (ref 9.5–12.2)
POIKILOCYTOSIS BLD QL SMEAR: (no result)
POIKILOCYTOSIS: (no result)
POIKILOCYTOSIS: (no result)
POLYCHROMASIA BLD QL SMEAR: (no result)
POLYCHROMASIA BLD QL SMEAR: PRESENT
POTASSIUM SERPL-SCNC: 4.1 MMOL/L (ref 3.5–5.1)
PROMYELOCYTES # BLD: (no result) K/UL
PROMYELOCYTES NFR BLD MANUAL: (no result) %
PROT SERPL-MCNC: 6 G/DL (ref 6.3–8.2)
RBC AGGLUTINATION: (no result)
RBC AGGLUTINATION: (no result)
RBC MORPH BLD: (no result)
RBC MORPH BLD: (no result)
RETICS #: 0.07 X 10*6/UL (ref 0.01–0.08)
RETICS/RBC NFR AUTO: 3.62 % (ref 0.1–1.8)
ROULEAUX BLD QL SMEAR: (no result)
ROULEAUX BLD QL SMEAR: (no result)
SCHISTOCYTES BLD QL SMEAR: (no result)
SICKLE CELLS BLD QL SMEAR: (no result)
SICKLE CELLS BLD QL SMEAR: (no result)
SMUDGE CELLS BLD QL SMEAR: (no result)
SMUDGE CELLS BLD QL SMEAR: (no result)
SODIUM SERPL-SCNC: 137 MMOL/L (ref 137–145)
SPHEROCYTES BLD QL SMEAR: (no result)
SPHEROCYTES BLD QL SMEAR: (no result)
STOMATOCYTES BLD QL SMEAR: (no result)
STOMATOCYTES BLD QL SMEAR: (no result)
TARGETS BLD QL SMEAR: (no result)
TARGETS BLD QL SMEAR: (no result)
TOXIC GRANULES BLD QL SMEAR: (no result)
TOXIC GRANULES BLD QL SMEAR: (no result)
VARIANT LYMPHS BLD QL SMEAR: (no result)
VIT B12 SERPL-MCNC: 333 PG/ML (ref 200–944)
WBC # BLD AUTO: 6.51 10*3/UL (ref 4.5–10)
WBC # BLD AUTO: 8.1 10*3/UL (ref 4.5–10)
WBC NRBC COR # BLD: (no result) K/UL
WBC NRBC COR # BLD: (no result) K/UL
WBC TOXIC VACUOLES BLD QL SMEAR: (no result)
WBC TOXIC VACUOLES BLD QL SMEAR: (no result)

## 2025-06-11 RX ADMIN — AZITHROMYCIN MONOHYDRATE SCH MLS/HR: 500 INJECTION, POWDER, LYOPHILIZED, FOR SOLUTION INTRAVENOUS at 17:00

## 2025-06-11 RX ADMIN — CEFTRIAXONE SCH MLS/HR: 2 INJECTION, POWDER, FOR SOLUTION INTRAMUSCULAR; INTRAVENOUS at 10:38

## 2025-06-11 RX ADMIN — HEPARIN SODIUM SCH UNIT: 5000 INJECTION, SOLUTION INTRAVENOUS; SUBCUTANEOUS at 10:38

## 2025-06-11 RX ADMIN — DOCUSATE SODIUM SCH MG: 100 CAPSULE, LIQUID FILLED ORAL at 22:06

## 2025-06-11 RX ADMIN — LOSARTAN POTASSIUM SCH MG: 25 TABLET, FILM COATED ORAL at 17:01

## 2025-06-11 RX ADMIN — POTASSIUM CHLORIDE SCH: 14.9 INJECTION, SOLUTION INTRAVENOUS at 06:59

## 2025-06-11 RX ADMIN — METOPROLOL TARTRATE SCH MG: 50 TABLET, FILM COATED ORAL at 10:46

## 2025-06-11 RX ADMIN — VALACYCLOVIR SCH MG: 500 TABLET, FILM COATED ORAL at 17:01

## 2025-06-11 RX ADMIN — PANTOPRAZOLE SODIUM SCH MG: 40 INJECTION, POWDER, FOR SOLUTION INTRAVENOUS at 10:38

## 2025-06-11 RX ADMIN — DICYCLOMINE HYDROCHLORIDE SCH MG: 10 CAPSULE ORAL at 17:01

## 2025-06-11 RX ADMIN — Medication SCH EACH: at 22:06

## 2025-06-12 LAB
ACANTHOCYTES BLD QL SMEAR: (no result)
AGRAN PLATELETS BLD QL SMEAR: (no result)
ALBUMIN SERPL-MCNC: 3.9 G/DL (ref 3.8–4.9)
ALBUMIN/GLOB SERPL: 1.62 RATIO (ref 1.6–3.17)
ALP SERPL-CCNC: 60 U/L (ref 41–126)
ALT SERPL-CCNC: 18 U/L (ref 10–49)
ANION GAP SERPL CALC-SCNC: 9.7 MMOL/L (ref 4–12)
ANISOCYTOSIS BLD QL SMEAR: (no result)
ANISOCYTOSIS BLD QL: (no result)
AST SERPL-CCNC: 39 U/L (ref 14–35)
AUER BODIES BLD QL SMEAR: (no result)
BASO STIPL BLD QL SMEAR: (no result)
BILIRUB BLD-MCNC: 0.5 MG/DL (ref 0.3–1.2)
BUN SERPL-SCNC: 30.3 MG/DL (ref 9–27)
BUN/CREAT SERPL: (no result)
BUN/CREAT SERPL: 25.25 RATIO (ref 12–20)
BURR CELLS BLD QL SMEAR: (no result)
BURR CELLS BLD QL SMEAR: (no result)
CALCIUM SPEC-MCNC: 8.6 MG/DL (ref 8.7–10.3)
CHLORIDE SERPL-SCNC: 109 MMOL/L (ref 96–109)
CO2 SERPL-SCNC: 18.3 MMOL/L (ref 21.6–31.8)
CREATININE: 1.2 MG/DL (ref 0.6–1.5)
DACRYOCYTES BLD QL SMEAR: (no result)
DOHLE BOD BLD QL SMEAR: (no result)
ELLIPTOCYTES BLD QL SMEAR: (no result)
ERYTHROCYTE [DISTWIDTH] IN BLOOD BY AUTOMATED COUNT: 1.91 X 10*6/UL (ref 4.4–5.6)
ERYTHROCYTE [DISTWIDTH] IN BLOOD: 15.4 % (ref 11.5–14.5)
GIANT PLATELETS BLD QL SMEAR: (no result)
GLOBULIN SER CALC-MCNC: 2.4 G/DL (ref 1.6–3.3)
GLUCOSE SERPL-MCNC: 100 MG/DL (ref 70–110)
HCT VFR BLD AUTO: 22.2 % (ref 39.6–50)
HELMET CELLS BLD QL SMEAR: (no result)
HGB BLD-MCNC: 7 G/DL (ref 13–17)
HOWELL-JOLLY BOD BLD QL SMEAR: (no result)
HYPERCHROMASIA: (no result)
HYPOCHROMIA BLD QL SMEAR: (no result)
HYPOCHROMIA BLD QL: (no result)
LG PLATELETS BLD QL SMEAR: (no result)
Lab: (no result)
MACROCYTES BLD QL SMEAR: (no result)
MACROCYTES BLD QL: (no result)
MAGNESIUM SPEC-SCNC: 2.3 MG/DL (ref 1.5–2.4)
MCH RBC QN AUTO: 36.6 PG (ref 27–32)
MCHC RBC AUTO-ENTMCNC: 31.5 G/DL (ref 32–37)
MCV RBC AUTO: 116.2 FL (ref 80–97)
MICROCYTES BLD QL SMEAR: (no result)
MICROCYTOSIS: (no result)
NEUTS HYPERSEG # BLD: (no result) 10*3/UL
NRBC BLD AUTO-RTO: (no result) %
NRBC BLD AUTO-RTO: 0 X 10*3/UL (ref 0–0.01)
OVALOCYTES BLD QL SMEAR: (no result)
PAPPENHEIMER BOD BLD QL SMEAR: (no result)
PARASITE [PRESENCE] IN BLOOD BY LIGHT MICROSCOPY: (no result)
PELGER HUET CELLS BLD QL SMEAR: (no result)
PLATELET # BLD AUTO: 226 X 10*3/UL (ref 140–440)
PLATELETS.RETICULATED NFR BLD AUTO: (no result) %
PMV BLD AUTO: 9.3 FL (ref 9.5–12.2)
POIKILOCYTOSIS BLD QL SMEAR: (no result)
POIKILOCYTOSIS BLD QL SMEAR: (no result)
POIKILOCYTOSIS: (no result)
POLYCHROMASIA BLD QL SMEAR: (no result)
POTASSIUM SERPL-SCNC: 4.4 MMOL/L (ref 3.5–5.5)
PROT SERPL-MCNC: 6.3 G/DL (ref 6.2–8.2)
RBC AGGLUTINATION: (no result)
RBC MORPH BLD: (no result)
ROULEAUX BLD QL SMEAR: (no result)
SCHISTOCYTES BLD QL SMEAR: (no result)
SCHISTOCYTES BLD QL SMEAR: (no result)
SICKLE CELLS BLD QL SMEAR: (no result)
SMUDGE CELLS BLD QL SMEAR: (no result)
SODIUM SERPL-SCNC: 137 MMOL/L (ref 135–145)
SPHEROCYTES BLD QL SMEAR: (no result)
STOMATOCYTES BLD QL SMEAR: (no result)
TARGETS BLD QL SMEAR: (no result)
TOXIC GRANULES BLD QL SMEAR: (no result)
VARIANT LYMPHS BLD QL SMEAR: (no result)
VARIANT LYMPHS BLD QL SMEAR: (no result)
WBC # BLD AUTO: 5.96 X 10*3/UL (ref 4.5–10)
WBC NRBC COR # BLD: (no result) K/UL
WBC TOXIC VACUOLES BLD QL SMEAR: (no result)

## 2025-06-12 RX ADMIN — OXYCODONE HYDROCHLORIDE AND ACETAMINOPHEN SCH MG: 500 TABLET ORAL at 08:46

## 2025-06-12 RX ADMIN — ASPIRIN 81 MG CHEWABLE TABLET SCH MG: 81 TABLET CHEWABLE at 08:47

## 2025-06-12 RX ADMIN — Medication SCH MG: at 08:47

## 2025-06-12 RX ADMIN — CHOLECALCIFEROL TAB 125 MCG (5000 UNIT) SCH MCG: 125 TAB at 08:47

## 2025-06-12 RX ADMIN — METOPROLOL TARTRATE SCH MG: 50 TABLET, FILM COATED ORAL at 20:34

## 2025-06-12 RX ADMIN — METOPROLOL TARTRATE SCH MG: 25 TABLET, FILM COATED ORAL at 08:48

## 2025-06-12 RX ADMIN — THERA TABS SCH EACH: TAB at 08:47

## 2025-06-13 LAB
ACANTHOCYTES BLD QL SMEAR: (no result)
AGRAN PLATELETS BLD QL SMEAR: (no result)
ANION GAP SERPL CALC-SCNC: 9 MMOL/L
ANISOCYTOSIS BLD QL SMEAR: (no result)
ANISOCYTOSIS BLD QL: (no result)
AUER BODIES BLD QL SMEAR: (no result)
BASO STIPL BLD QL SMEAR: (no result)
BUN SERPL-SCNC: 24 MG/DL (ref 9–20)
BURR CELLS BLD QL SMEAR: (no result)
BURR CELLS BLD QL SMEAR: (no result)
CALCIUM SPEC-MCNC: 9.2 MG/DL (ref 8.4–10.2)
CHLORIDE SERPL-SCNC: 112 MMOL/L (ref 98–107)
CO2 SERPL-SCNC: 17 MMOL/L (ref 22–30)
CREATININE: 1 MG/DL (ref 0.66–1.25)
DACRYOCYTES BLD QL SMEAR: (no result)
DOHLE BOD BLD QL SMEAR: (no result)
ELLIPTOCYTES BLD QL SMEAR: (no result)
ERYTHROCYTE [DISTWIDTH] IN BLOOD BY AUTOMATED COUNT: 1.77 10*6/UL (ref 4.4–5.6)
ERYTHROCYTE [DISTWIDTH] IN BLOOD: 14.8 % (ref 11.5–14.5)
GIANT PLATELETS BLD QL SMEAR: (no result)
GLUCOSE SERPL-MCNC: 103 MG/DL (ref 74–99)
HCT VFR BLD AUTO: 20 % (ref 39.6–50)
HELMET CELLS BLD QL SMEAR: (no result)
HGB BLD-MCNC: 6.6 G/DL (ref 13–17)
HOWELL-JOLLY BOD BLD QL SMEAR: (no result)
HYPERCHROMASIA: (no result)
HYPOCHROMIA BLD QL SMEAR: (no result)
HYPOCHROMIA BLD QL: (no result)
LG PLATELETS BLD QL SMEAR: (no result)
Lab: (no result)
MACROCYTES BLD QL SMEAR: (no result)
MACROCYTES BLD QL: (no result)
MCH RBC QN AUTO: 37.3 PG (ref 27–32)
MCHC RBC AUTO-ENTMCNC: 33 G/DL (ref 32–37)
MCV RBC AUTO: 113 FL (ref 80–97)
MICROCYTES BLD QL SMEAR: (no result)
MICROCYTOSIS: (no result)
NEUTS HYPERSEG # BLD: (no result) 10*3/UL
NRBC BLD AUTO-RTO: (no result) %
OVALOCYTES BLD QL SMEAR: (no result)
PAPPENHEIMER BOD BLD QL SMEAR: (no result)
PARASITE [PRESENCE] IN BLOOD BY LIGHT MICROSCOPY: (no result)
PELGER HUET CELLS BLD QL SMEAR: (no result)
PLATELET # BLD AUTO: 222 10*3/UL (ref 140–440)
PLATELETS.RETICULATED NFR BLD AUTO: (no result) %
PMV BLD AUTO: 9.2 FL (ref 9.5–12.2)
POIKILOCYTOSIS BLD QL SMEAR: (no result)
POIKILOCYTOSIS BLD QL SMEAR: (no result)
POIKILOCYTOSIS: (no result)
POLYCHROMASIA BLD QL SMEAR: (no result)
POTASSIUM SERPL-SCNC: 4.4 MMOL/L (ref 3.5–5.1)
RBC AGGLUTINATION: (no result)
RBC MORPH BLD: (no result)
ROULEAUX BLD QL SMEAR: (no result)
SCHISTOCYTES BLD QL SMEAR: (no result)
SCHISTOCYTES BLD QL SMEAR: (no result)
SICKLE CELLS BLD QL SMEAR: (no result)
SMUDGE CELLS BLD QL SMEAR: (no result)
SODIUM SERPL-SCNC: 138 MMOL/L (ref 137–145)
SPHEROCYTES BLD QL SMEAR: (no result)
STOMATOCYTES BLD QL SMEAR: (no result)
TARGETS BLD QL SMEAR: (no result)
TOXIC GRANULES BLD QL SMEAR: (no result)
VARIANT LYMPHS BLD QL SMEAR: (no result)
VARIANT LYMPHS BLD QL SMEAR: (no result)
WBC # BLD AUTO: 4.96 10*3/UL (ref 4.5–10)
WBC NRBC COR # BLD: (no result) K/UL
WBC TOXIC VACUOLES BLD QL SMEAR: (no result)

## 2025-06-14 LAB
ACANTHOCYTES BLD QL SMEAR: (no result)
AGRAN PLATELETS BLD QL SMEAR: (no result)
ANISOCYTOSIS BLD QL SMEAR: (no result)
ANISOCYTOSIS BLD QL: (no result)
AUER BODIES BLD QL SMEAR: (no result)
BASO STIPL BLD QL SMEAR: (no result)
BURR CELLS BLD QL SMEAR: (no result)
BURR CELLS BLD QL SMEAR: (no result)
DACRYOCYTES BLD QL SMEAR: (no result)
DOHLE BOD BLD QL SMEAR: (no result)
ELLIPTOCYTES BLD QL SMEAR: (no result)
ERYTHROCYTE [DISTWIDTH] IN BLOOD BY AUTOMATED COUNT: 1.91 10*6/UL (ref 4.4–5.6)
ERYTHROCYTE [DISTWIDTH] IN BLOOD: 19.7 % (ref 11.5–14.5)
GIANT PLATELETS BLD QL SMEAR: (no result)
HCT VFR BLD AUTO: 20.7 % (ref 39.6–50)
HELMET CELLS BLD QL SMEAR: (no result)
HGB BLD-MCNC: 6.8 G/DL (ref 13–17)
HOWELL-JOLLY BOD BLD QL SMEAR: (no result)
HYPERCHROMASIA: (no result)
HYPOCHROMIA BLD QL SMEAR: (no result)
HYPOCHROMIA BLD QL: (no result)
LG PLATELETS BLD QL SMEAR: (no result)
Lab: (no result)
MACROCYTES BLD QL SMEAR: (no result)
MACROCYTES BLD QL: (no result)
MCH RBC QN AUTO: 35.6 PG (ref 27–32)
MCHC RBC AUTO-ENTMCNC: 32.9 G/DL (ref 32–37)
MCV RBC AUTO: 108.4 FL (ref 80–97)
MICROCYTES BLD QL SMEAR: (no result)
MICROCYTOSIS: (no result)
NEUTS HYPERSEG # BLD: (no result) 10*3/UL
NRBC BLD AUTO-RTO: (no result) %
OVALOCYTES BLD QL SMEAR: (no result)
PAPPENHEIMER BOD BLD QL SMEAR: (no result)
PARASITE [PRESENCE] IN BLOOD BY LIGHT MICROSCOPY: (no result)
PELGER HUET CELLS BLD QL SMEAR: (no result)
PLATELET # BLD AUTO: 191 10*3/UL (ref 140–440)
PLATELETS.RETICULATED NFR BLD AUTO: (no result) %
PMV BLD AUTO: 8.9 FL (ref 9.5–12.2)
POIKILOCYTOSIS BLD QL SMEAR: (no result)
POIKILOCYTOSIS BLD QL SMEAR: (no result)
POIKILOCYTOSIS: (no result)
POLYCHROMASIA BLD QL SMEAR: (no result)
RBC AGGLUTINATION: (no result)
RBC MORPH BLD: (no result)
ROULEAUX BLD QL SMEAR: (no result)
SCHISTOCYTES BLD QL SMEAR: (no result)
SCHISTOCYTES BLD QL SMEAR: (no result)
SICKLE CELLS BLD QL SMEAR: (no result)
SMUDGE CELLS BLD QL SMEAR: (no result)
SPHEROCYTES BLD QL SMEAR: (no result)
STOMATOCYTES BLD QL SMEAR: (no result)
TARGETS BLD QL SMEAR: (no result)
TOXIC GRANULES BLD QL SMEAR: (no result)
VARIANT LYMPHS BLD QL SMEAR: (no result)
VARIANT LYMPHS BLD QL SMEAR: (no result)
WBC # BLD AUTO: 4.58 10*3/UL (ref 4.5–10)
WBC NRBC COR # BLD: (no result) K/UL
WBC TOXIC VACUOLES BLD QL SMEAR: (no result)

## 2025-06-15 VITALS — DIASTOLIC BLOOD PRESSURE: 78 MMHG | SYSTOLIC BLOOD PRESSURE: 157 MMHG | HEART RATE: 62 BPM | TEMPERATURE: 97.8 F

## 2025-06-15 VITALS — RESPIRATION RATE: 16 BRPM

## 2025-06-15 LAB
ERYTHROCYTE [DISTWIDTH] IN BLOOD BY AUTOMATED COUNT: 2.25 10*6/UL (ref 4.4–5.6)
ERYTHROCYTE [DISTWIDTH] IN BLOOD: 20 % (ref 11.5–14.5)
HCT VFR BLD AUTO: 23.5 % (ref 39.6–50)
HGB BLD-MCNC: 7.8 G/DL (ref 13–17)
MCH RBC QN AUTO: 34.7 PG (ref 27–32)
MCHC RBC AUTO-ENTMCNC: 33.2 G/DL (ref 32–37)
MCV RBC AUTO: 104.4 FL (ref 80–97)
PLATELET # BLD AUTO: 205 10*3/UL (ref 140–440)
PMV BLD AUTO: 8.8 FL (ref 9.5–12.2)
WBC # BLD AUTO: 5.34 10*3/UL (ref 4.5–10)

## 2025-07-17 ENCOUNTER — HOSPITAL ENCOUNTER (OUTPATIENT)
Dept: HOSPITAL 47 - LABWHC1 | Age: 80
Discharge: HOME | End: 2025-07-17
Attending: INTERNAL MEDICINE
Payer: MEDICARE

## 2025-07-17 DIAGNOSIS — N18.9: Primary | ICD-10-CM

## 2025-07-17 LAB
ACANTHOCYTES BLD QL SMEAR: (no result)
AGRAN PLATELETS BLD QL SMEAR: (no result)
ALBUMIN SERPL-MCNC: 4.4 G/DL (ref 3.8–4.9)
ALBUMIN/GLOB SERPL: 1.63 RATIO (ref 1.6–3.17)
ALP SERPL-CCNC: 63 U/L (ref 41–126)
ALT SERPL-CCNC: 17 U/L (ref 10–49)
AMM URATE CRY UR QL COMP ASSIST: (no result)
AMORPH SED URNS QL MICRO: (no result) /HPF
ANION GAP SERPL CALC-SCNC: 7.6 MMOL/L (ref 4–12)
ANISOCYTOSIS BLD QL SMEAR: (no result)
ANISOCYTOSIS BLD QL: (no result)
APPEARANCE UR: CLEAR
AST SERPL-CCNC: 28 U/L (ref 14–35)
AUER BODIES BLD QL SMEAR: (no result)
BACTERIA UR QL AUTO: (no result)
BASO STIPL BLD QL SMEAR: (no result)
BASOPHILS # BLD AUTO: (no result) K/UL (ref 0–0.2)
BASOPHILS # BLD MANUAL: 0.42 X 10*3/UL (ref 0–0.1)
BASOPHILS NFR BLD AUTO: (no result) %
BASOPHILS NFR SPEC MANUAL: 4 %
BILIRUB BLD-MCNC: 0.7 MG/DL (ref 0.3–1.2)
BILIRUB UR QL STRIP.AUTO: NEGATIVE
BLASTS # BLD MANUAL: (no result) %
BLASTS # BLD MANUAL: (no result) K/UL
BROAD CASTS [PRESENCE] IN URINE BY COMPUTER ASSISTED METHOD: (no result)
BUN SERPL-SCNC: 21.6 MG/DL (ref 9–27)
BUN/CREAT SERPL: 16.62 RATIO (ref 12–20)
BURR CELLS BLD QL SMEAR: (no result)
BURR CELLS BLD QL SMEAR: (no result)
CA CARBONATE CRY #/AREA URNS HPF: (no result) /HPF
CA PHOS CRY UR QL COMP ASSIST: (no result) /HPF
CALCIUM SPEC-MCNC: 9.8 MG/DL (ref 8.7–10.3)
CAOX CRY UR QL COMP ASSIST: (no result) /HPF
CASTS URNS QL MICRO: (no result) /LPF
CELLS COUNTED: (no result)
CHLORIDE SERPL-SCNC: 103 MMOL/L (ref 96–109)
CO2 SERPL-SCNC: 25.4 MMOL/L (ref 21.6–31.8)
COARSE GRAN CASTS URNS QL MICRO: (no result)
COLOR UR: YELLOW
CREATININE,URINE RANDOM: 108.5 MG/DL
CREATININE: 1.3 MG/DL (ref 0.6–1.5)
CRYSTALS UR QL: (no result) /HPF
CYSTINE CRY #/AREA URNS HPF: (no result) /HPF
DACRYOCYTES BLD QL SMEAR: (no result)
DOHLE BOD BLD QL SMEAR: (no result)
ELLIPTOCYTES BLD QL SMEAR: (no result)
EOSINOPHIL # BLD AUTO: (no result) K/UL (ref 0–0.7)
EOSINOPHIL # BLD MANUAL: 0 X 10*3/UL (ref 0.04–0.35)
EOSINOPHIL NFR BLD AUTO: (no result) %
EOSINOPHIL NFR BLD MANUAL: (no result) %
ERYTHROCYTE CLUMPS [PRESENCE] IN URINE BY COMPUTER ASSISTED METHOD: (no result) /HPF
ERYTHROCYTE [DISTWIDTH] IN BLOOD BY AUTOMATED COUNT: 3.07 X 10*6/UL (ref 4.4–5.6)
ERYTHROCYTE [DISTWIDTH] IN BLOOD: 15.3 % (ref 11.5–14.5)
GIANT PLATELETS BLD QL SMEAR: (no result)
GLOBULIN SER CALC-MCNC: 2.7 G/DL (ref 1.6–3.3)
GLUCOSE SERPL-MCNC: 100 MG/DL (ref 70–110)
HCT VFR BLD AUTO: 33.6 % (ref 39.6–50)
HELMET CELLS BLD QL SMEAR: (no result)
HGB BLD-MCNC: 10.7 G/DL (ref 13–17)
HOWELL-JOLLY BOD BLD QL SMEAR: (no result)
HYPERCHROMASIA: (no result)
HYPOCHROMIA BLD QL SMEAR: (no result)
HYPOCHROMIA BLD QL: (no result)
IMM GRANULOCYTES # BLD: (no result) 10*3/UL
IMM GRANULOCYTES BLD QL AUTO: (no result)
KETONES UR QL STRIP: (no result)
LEUCINE CRYSTALS [PRESENCE] IN URINE BY COMPUTER ASSISTED METHOD: (no result) /HPF
LG PLATELETS BLD QL SMEAR: (no result)
LYMPHOCYTES # BLD MANUAL: 7.41 X 10*3/UL (ref 0.9–5)
LYMPHOCYTES # SPEC AUTO: (no result) K/UL (ref 1–4.8)
LYMPHOCYTES NFR BLD MANUAL: 70 %
LYMPHOCYTES NFR SPEC AUTO: (no result) %
Lab: (no result)
MACROCYTES BLD QL SMEAR: (no result)
MACROCYTES BLD QL: (no result)
MAGNESIUM SPEC-SCNC: 2.2 MG/DL (ref 1.5–2.4)
MCH RBC QN AUTO: 34.9 PG (ref 27–32)
MCHC RBC AUTO-ENTMCNC: 31.8 G/DL (ref 32–37)
MCV RBC AUTO: 109.4 FL (ref 80–97)
METAMYELOCYTES # BLD: (no result) K/UL
METAMYELOCYTES NFR BLD MANUAL: (no result) %
MICROCYTES BLD QL SMEAR: (no result)
MICROCYTOSIS: (no result)
MONOCYTES # BLD AUTO: (no result) K/UL (ref 0–1)
MONOCYTES # BLD MANUAL: 0.21 X 10*3/UL (ref 0.2–1)
MONOCYTES NFR BLD AUTO: (no result) %
MONOCYTES NFR BLD MANUAL: 2 %
MUCOUS THREADS UR QL AUTO: (no result) /HPF
MYELOCYTES # BLD MANUAL: (no result) K/UL
MYELOCYTES NFR BLD MANUAL: (no result) %
NEUTROPHILS # BLD AUTO: (no result) K/UL (ref 1.3–7.7)
NEUTROPHILS NFR BLD AUTO: (no result) %
NEUTROPHILS NFR BLD MANUAL: 24 %
NEUTS BAND # BLD MANUAL: (no result) K/UL
NEUTS BAND NFR BLD: (no result) %
NEUTS HYPERSEG # BLD: (no result) 10*3/UL
NEUTS SEG # BLD MANUAL: 2.54 X 10*3/UL (ref 1.8–7.7)
NITRITE UR QL: NEGATIVE
NRBC # BLD: (no result) /100 WBC (ref 0–0)
NRBC BLD AUTO-RTO: 0 X 10*3/UL (ref 0–0.01)
OTHER CELLS NFR BLD MANUAL: (no result) %
OVAL FAT BODIES #/AREA UR COMP ASSIST: (no result) /HPF
OVALOCYTES BLD QL SMEAR: (no result)
PAPPENHEIMER BOD BLD QL SMEAR: (no result)
PARASITE [PRESENCE] IN BLOOD BY LIGHT MICROSCOPY: (no result)
PELGER HUET CELLS BLD QL SMEAR: (no result)
PH UR: 6 [PH]
PHOSPHATE SERPL-MCNC: 4.3 MG/DL (ref 2.4–5.1)
PLASMA CELLS # BLD MANUAL: (no result) %
PLASMA CELLS # BLD MANUAL: (no result) K/UL
PLATELET # BLD AUTO: 203 X 10*3/UL (ref 140–440)
PLATELETS.RETICULATED NFR BLD AUTO: (no result) %
PMV BLD AUTO: 9.2 FL (ref 9.5–12.2)
POIKILOCYTOSIS BLD QL SMEAR: (no result)
POIKILOCYTOSIS BLD QL SMEAR: (no result)
POIKILOCYTOSIS: (no result)
POLYCHROMASIA BLD QL SMEAR: (no result)
POTASSIUM SERPL-SCNC: 5.1 MMOL/L (ref 3.5–5.5)
PROMYELOCYTES # BLD: (no result) K/UL
PROMYELOCYTES NFR BLD MANUAL: (no result) %
PROT SERPL-MCNC: 7.1 G/DL (ref 6.2–8.2)
PROT/CREAT UR-RTO: 0.08
RBC AGGLUTINATION: (no result)
RBC MORPH BLD: NORMAL
RBC UR QL: NEGATIVE
ROULEAUX BLD QL SMEAR: (no result)
SCHISTOCYTES BLD QL SMEAR: (no result)
SCHISTOCYTES BLD QL SMEAR: (no result)
SICKLE CELLS BLD QL SMEAR: (no result)
SMUDGE CELLS BLD QL SMEAR: PRESENT
SODIUM SERPL-SCNC: 136 MMOL/L (ref 135–145)
SP GR UR: 1.01 (ref 1–1.03)
SPERM # UR AUTO: (no result) /HPF
SPHEROCYTES BLD QL SMEAR: (no result)
STARCH GRANULES URNS QL MICRO: (no result)
STOMATOCYTES BLD QL SMEAR: (no result)
TARGETS BLD QL SMEAR: (no result)
TOXIC GRANULES BLD QL SMEAR: (no result)
TRI-PHOS CRY UR QL COMP ASSIST: (no result) /HPF
TRICHOMONAS UR QL COMP ASSIST: (no result) /HPF
TYROSINE CRYSTALS [PRESENCE] IN URINE BY COMPUTER ASSISTED METHOD: (no result) /HPF
URATE CRY UR QL COMP ASSIST: (no result) /HPF
UROBILINOGEN UR QL: 1 E.U./DL
VARIANT LYMPHS BLD QL SMEAR: (no result)
VARIANT LYMPHS BLD QL SMEAR: (no result)
WBC # BLD AUTO: 10.59 X 10*3/UL (ref 4.5–10)
WBC CLUMPS UR QL AUTO: (no result) /HPF
WBC NRBC COR # BLD: (no result) K/UL
WBC TOXIC VACUOLES BLD QL SMEAR: (no result)
YEAST BUDDING UR QL COMP ASSIST: (no result) /HPF
YEAST HYPHAE UR QL COMP ASSIST: (no result) /HPF
YEAST URNS QL MICRO: (no result)

## 2025-07-17 PROCEDURE — 82570 ASSAY OF URINE CREATININE: CPT

## 2025-07-17 PROCEDURE — 83735 ASSAY OF MAGNESIUM: CPT

## 2025-07-17 PROCEDURE — 85025 COMPLETE CBC W/AUTO DIFF WBC: CPT

## 2025-07-17 PROCEDURE — 84156 ASSAY OF PROTEIN URINE: CPT

## 2025-07-17 PROCEDURE — 81001 URINALYSIS AUTO W/SCOPE: CPT

## 2025-07-17 PROCEDURE — 84100 ASSAY OF PHOSPHORUS: CPT

## 2025-07-17 PROCEDURE — 80053 COMPREHEN METABOLIC PANEL: CPT

## 2025-07-17 PROCEDURE — 36415 COLL VENOUS BLD VENIPUNCTURE: CPT
